# Patient Record
Sex: MALE | Race: WHITE | Employment: PART TIME | URBAN - METROPOLITAN AREA
[De-identification: names, ages, dates, MRNs, and addresses within clinical notes are randomized per-mention and may not be internally consistent; named-entity substitution may affect disease eponyms.]

---

## 2018-05-25 ENCOUNTER — HOSPITAL ENCOUNTER (EMERGENCY)
Age: 48
Discharge: HOME | End: 2018-05-25
Attending: EMERGENCY MEDICINE

## 2018-05-25 ENCOUNTER — APPOINTMENT (OUTPATIENT)
Dept: CT IMAGING | Age: 48
End: 2018-05-25

## 2018-05-25 VITALS
WEIGHT: 210 LBS | SYSTOLIC BLOOD PRESSURE: 138 MMHG | BODY MASS INDEX: 29.4 KG/M2 | RESPIRATION RATE: 14 BRPM | DIASTOLIC BLOOD PRESSURE: 89 MMHG | HEIGHT: 71 IN | HEART RATE: 91 BPM | TEMPERATURE: 98.7 F | OXYGEN SATURATION: 99 %

## 2018-05-25 DIAGNOSIS — T40.604A OPIATE OVERDOSE, UNDETERMINED INTENT, INITIAL ENCOUNTER (HCC): Primary | ICD-10-CM

## 2018-05-25 LAB
ANION GAP SERPL CALCULATED.3IONS-SCNC: 19 MMOL/L (ref 7–16)
BASOPHILS ABSOLUTE: 0.09 E9/L (ref 0–0.2)
BASOPHILS RELATIVE PERCENT: 0.5 % (ref 0–2)
BUN BLDV-MCNC: 14 MG/DL (ref 6–20)
CALCIUM SERPL-MCNC: 8.9 MG/DL (ref 8.6–10.2)
CHLORIDE BLD-SCNC: 99 MMOL/L (ref 98–107)
CO2: 25 MMOL/L (ref 22–29)
CREAT SERPL-MCNC: 1.2 MG/DL (ref 0.7–1.2)
EKG ATRIAL RATE: 97 BPM
EKG P AXIS: 39 DEGREES
EKG P-R INTERVAL: 134 MS
EKG Q-T INTERVAL: 358 MS
EKG QRS DURATION: 72 MS
EKG QTC CALCULATION (BAZETT): 454 MS
EKG R AXIS: 31 DEGREES
EKG T AXIS: 61 DEGREES
EKG VENTRICULAR RATE: 97 BPM
EOSINOPHILS ABSOLUTE: 0.05 E9/L (ref 0.05–0.5)
EOSINOPHILS RELATIVE PERCENT: 0.3 % (ref 0–6)
GFR AFRICAN AMERICAN: >60
GFR NON-AFRICAN AMERICAN: >60 ML/MIN/1.73
GLUCOSE BLD-MCNC: 274 MG/DL (ref 74–109)
HCT VFR BLD CALC: 47.2 % (ref 37–54)
HEMOGLOBIN: 16 G/DL (ref 12.5–16.5)
IMMATURE GRANULOCYTES #: 0.07 E9/L
IMMATURE GRANULOCYTES %: 0.4 % (ref 0–5)
LYMPHOCYTES ABSOLUTE: 5.55 E9/L (ref 1.5–4)
LYMPHOCYTES RELATIVE PERCENT: 33.2 % (ref 20–42)
MCH RBC QN AUTO: 32.4 PG (ref 26–35)
MCHC RBC AUTO-ENTMCNC: 33.9 % (ref 32–34.5)
MCV RBC AUTO: 95.5 FL (ref 80–99.9)
MONOCYTES ABSOLUTE: 1.3 E9/L (ref 0.1–0.95)
MONOCYTES RELATIVE PERCENT: 7.8 % (ref 2–12)
NEUTROPHILS ABSOLUTE: 9.64 E9/L (ref 1.8–7.3)
NEUTROPHILS RELATIVE PERCENT: 57.8 % (ref 43–80)
PDW BLD-RTO: 14 FL (ref 11.5–15)
PLATELET # BLD: 274 E9/L (ref 130–450)
PMV BLD AUTO: 10 FL (ref 7–12)
POTASSIUM SERPL-SCNC: 3.7 MMOL/L (ref 3.5–5)
RBC # BLD: 4.94 E12/L (ref 3.8–5.8)
SODIUM BLD-SCNC: 143 MMOL/L (ref 132–146)
WBC # BLD: 16.7 E9/L (ref 4.5–11.5)

## 2018-05-25 PROCEDURE — 36415 COLL VENOUS BLD VENIPUNCTURE: CPT

## 2018-05-25 PROCEDURE — 70450 CT HEAD/BRAIN W/O DYE: CPT

## 2018-05-25 PROCEDURE — 2580000003 HC RX 258: Performed by: EMERGENCY MEDICINE

## 2018-05-25 PROCEDURE — 85025 COMPLETE CBC W/AUTO DIFF WBC: CPT

## 2018-05-25 PROCEDURE — 2500000003 HC RX 250 WO HCPCS

## 2018-05-25 PROCEDURE — 80048 BASIC METABOLIC PNL TOTAL CA: CPT

## 2018-05-25 PROCEDURE — 99284 EMERGENCY DEPT VISIT MOD MDM: CPT

## 2018-05-25 PROCEDURE — 93005 ELECTROCARDIOGRAM TRACING: CPT | Performed by: EMERGENCY MEDICINE

## 2018-05-25 PROCEDURE — 6360000002 HC RX W HCPCS: Performed by: EMERGENCY MEDICINE

## 2018-05-25 PROCEDURE — 96375 TX/PRO/DX INJ NEW DRUG ADDON: CPT

## 2018-05-25 PROCEDURE — 96374 THER/PROPH/DIAG INJ IV PUSH: CPT

## 2018-05-25 RX ORDER — KETOROLAC TROMETHAMINE 30 MG/ML
15 INJECTION, SOLUTION INTRAMUSCULAR; INTRAVENOUS ONCE
Status: COMPLETED | OUTPATIENT
Start: 2018-05-25 | End: 2018-05-25

## 2018-05-25 RX ORDER — 0.9 % SODIUM CHLORIDE 0.9 %
1000 INTRAVENOUS SOLUTION INTRAVENOUS ONCE
Status: COMPLETED | OUTPATIENT
Start: 2018-05-25 | End: 2018-05-25

## 2018-05-25 RX ORDER — ONDANSETRON 2 MG/ML
8 INJECTION INTRAMUSCULAR; INTRAVENOUS ONCE
Status: COMPLETED | OUTPATIENT
Start: 2018-05-25 | End: 2018-05-25

## 2018-05-25 RX ORDER — DIAPER,BRIEF,INFANT-TODD,DISP
EACH MISCELLANEOUS ONCE
Status: DISCONTINUED | OUTPATIENT
Start: 2018-05-25 | End: 2018-05-25

## 2018-05-25 RX ORDER — AMOXICILLIN AND CLAVULANATE POTASSIUM 500; 125 MG/1; MG/1
1 TABLET, FILM COATED ORAL ONCE
Status: DISCONTINUED | OUTPATIENT
Start: 2018-05-25 | End: 2018-05-25

## 2018-05-25 RX ADMIN — KETOROLAC TROMETHAMINE 15 MG: 30 INJECTION, SOLUTION INTRAMUSCULAR at 09:09

## 2018-05-25 RX ADMIN — ONDANSETRON 8 MG: 2 INJECTION, SOLUTION INTRAMUSCULAR; INTRAVENOUS at 07:04

## 2018-05-25 RX ADMIN — SODIUM CHLORIDE 1000 ML: 9 INJECTION, SOLUTION INTRAVENOUS at 07:03

## 2018-05-25 ASSESSMENT — PAIN DESCRIPTION - PAIN TYPE: TYPE: ACUTE PAIN

## 2018-05-25 ASSESSMENT — PAIN SCALES - GENERAL: PAINLEVEL_OUTOF10: 10

## 2018-05-25 ASSESSMENT — PAIN DESCRIPTION - LOCATION: LOCATION: HEAD

## 2018-05-25 ASSESSMENT — PAIN DESCRIPTION - DESCRIPTORS: DESCRIPTORS: ACHING

## 2020-08-07 ENCOUNTER — OFFICE VISIT (OUTPATIENT)
Dept: PRIMARY CARE CLINIC | Age: 50
End: 2020-08-07
Payer: MEDICAID

## 2020-08-07 VITALS
TEMPERATURE: 97.1 F | SYSTOLIC BLOOD PRESSURE: 130 MMHG | OXYGEN SATURATION: 98 % | HEIGHT: 71 IN | BODY MASS INDEX: 27.72 KG/M2 | DIASTOLIC BLOOD PRESSURE: 84 MMHG | HEART RATE: 67 BPM | WEIGHT: 198 LBS

## 2020-08-07 PROBLEM — I48.91 ATRIAL FIBRILLATION (HCC): Status: ACTIVE | Noted: 2020-08-07

## 2020-08-07 PROBLEM — I47.1 SVT (SUPRAVENTRICULAR TACHYCARDIA) (HCC): Status: ACTIVE | Noted: 2020-08-07

## 2020-08-07 PROBLEM — B33.24 VIRAL CARDIOMYOPATHY (HCC): Status: ACTIVE | Noted: 2020-08-07

## 2020-08-07 PROCEDURE — 99204 OFFICE O/P NEW MOD 45 MIN: CPT | Performed by: NURSE PRACTITIONER

## 2020-08-07 ASSESSMENT — ENCOUNTER SYMPTOMS
VOMITING: 0
ABDOMINAL PAIN: 0
TROUBLE SWALLOWING: 0
PHOTOPHOBIA: 0
SINUS PRESSURE: 0
VOICE CHANGE: 0
FACIAL SWELLING: 0
DIARRHEA: 0
CHOKING: 0
EYE PAIN: 0
CONSTIPATION: 0
WHEEZING: 0
ABDOMINAL DISTENTION: 0
EYE ITCHING: 0
BLOOD IN STOOL: 0
NAUSEA: 0
COLOR CHANGE: 0
SINUS PAIN: 0
SORE THROAT: 0
SHORTNESS OF BREATH: 0
RHINORRHEA: 0
EYE REDNESS: 0
COUGH: 0
CHEST TIGHTNESS: 0
EYE DISCHARGE: 0
BACK PAIN: 1

## 2020-08-07 ASSESSMENT — PATIENT HEALTH QUESTIONNAIRE - PHQ9
1. LITTLE INTEREST OR PLEASURE IN DOING THINGS: 1
SUM OF ALL RESPONSES TO PHQ QUESTIONS 1-9: 1
SUM OF ALL RESPONSES TO PHQ QUESTIONS 1-9: 1
2. FEELING DOWN, DEPRESSED OR HOPELESS: 0
SUM OF ALL RESPONSES TO PHQ9 QUESTIONS 1 & 2: 1
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
## 2020-08-17 ENCOUNTER — OFFICE VISIT (OUTPATIENT)
Dept: PRIMARY CARE CLINIC | Age: 50
End: 2020-08-17
Payer: MEDICAID

## 2020-08-17 VITALS
OXYGEN SATURATION: 99 % | TEMPERATURE: 96.6 F | BODY MASS INDEX: 27.06 KG/M2 | HEART RATE: 87 BPM | DIASTOLIC BLOOD PRESSURE: 96 MMHG | WEIGHT: 194 LBS | SYSTOLIC BLOOD PRESSURE: 142 MMHG

## 2020-08-17 PROCEDURE — 99214 OFFICE O/P EST MOD 30 MIN: CPT | Performed by: NURSE PRACTITIONER

## 2020-08-17 PROCEDURE — 20552 NJX 1/MLT TRIGGER POINT 1/2: CPT | Performed by: NURSE PRACTITIONER

## 2020-08-17 RX ORDER — PREDNISONE 20 MG/1
20 TABLET ORAL 2 TIMES DAILY
Qty: 10 TABLET | Refills: 0 | Status: SHIPPED | OUTPATIENT
Start: 2020-08-17 | End: 2020-08-22

## 2020-08-17 RX ORDER — TIZANIDINE 4 MG/1
4 TABLET ORAL 3 TIMES DAILY
Qty: 21 TABLET | Refills: 0 | Status: SHIPPED | OUTPATIENT
Start: 2020-08-17 | End: 2020-08-24

## 2020-08-17 ASSESSMENT — ENCOUNTER SYMPTOMS
ABDOMINAL PAIN: 0
DIARRHEA: 0
NAUSEA: 0
RECTAL PAIN: 0
VOMITING: 0
BACK PAIN: 1
BLOOD IN STOOL: 0

## 2020-08-17 NOTE — PROGRESS NOTES
Constitutional: Negative for activity change. Cardiovascular: Negative for chest pain, palpitations and leg swelling. Gastrointestinal: Negative for abdominal pain, blood in stool, diarrhea, nausea, rectal pain and vomiting. Genitourinary: Negative for difficulty urinating, dysuria and flank pain. Musculoskeletal: Positive for back pain. Negative for gait problem and myalgias. Current Outpatient Medications:     predniSONE (DELTASONE) 20 MG tablet, Take 1 tablet by mouth 2 times daily for 5 days, Disp: 10 tablet, Rfl: 0    tiZANidine (ZANAFLEX) 4 MG tablet, Take 1 tablet by mouth 3 times daily for 7 days, Disp: 21 tablet, Rfl: 0  Allergies   Allergen Reactions    Red Dye        Past Medical History:   Diagnosis Date    Atrial fibrillation (HCC)     Hypertension         Vitals:    08/17/20 0921 08/17/20 0925   BP: (!) 146/100 (!) 142/96   Pulse: 87    Temp: 96.6 °F (35.9 °C)    SpO2: 99%    Weight: 194 lb (88 kg)        Physical Exam  Constitutional:       Appearance: Normal appearance. HENT:      Head: Normocephalic. Cardiovascular:      Rate and Rhythm: Normal rate and regular rhythm. Pulses: Normal pulses. Pulmonary:      Effort: Pulmonary effort is normal.      Breath sounds: Normal breath sounds. Abdominal:      General: Abdomen is flat.    Musculoskeletal:      Comments: SHOULDER:  Right   Inspection/Palpation - no bruising, no swelling, no crepitus, no deformities, no tenderness    Range of Motion -    pain with passive/active range of motion    Strength/Tone -                          non diagnostic due to pain    Diagnostic Tests - negative  impingement sign, negative cross chest, negative apprehension test, negative sulcus sign, negative anterior laxity, negative anterior-inferior laxity, no multidirectional laxity,  negative drop arm test, negative Speeds test (Slap Leasion), negative Yergasons test (Biceps Tendon and SLAP)    CERVICAL SPINE :    Inspection/Palpation - No bony tenderness, right paraspinal tenderness isolated to a 2 cm x 3 cm area just medial to the right scapula, no palpable defect no step offs appreciated, otherwise normal alignment and posture    Range of Motion -   Limited due to pain ROM-moves slowly and ratchet like    Strength/Tone -          Non diagnostic due to pain      Diagnostic Tests -right  weaker        Neurological:      Mental Status: He is alert. Assessment and Plan:    Jose Rafael Herbert was seen today for neck pain. Diagnoses and all orders for this visit:    Radiculopathy of cervical spine  -     MRI CERVICAL SPINE WO CONTRAST; Future  -     Ambulatory referral to Neurosurgery    Neck pain, acute  -     MRI CERVICAL SPINE WO CONTRAST; Future  -     Ambulatory referral to Neurosurgery    Chronic neck and back pain  -     MRI CERVICAL SPINE WO CONTRAST; Future    Muscle weakness of right upper extremity  -     MRI CERVICAL SPINE WO CONTRAST; Future  -     Ambulatory referral to Neurosurgery    Other orders  -     predniSONE (DELTASONE) 20 MG tablet; Take 1 tablet by mouth 2 times daily for 5 days  -     tiZANidine (ZANAFLEX) 4 MG tablet; Take 1 tablet by mouth 3 times daily for 7 days    Patient was advised to follow-up for an MRI of the cervical spine in the meantime try to get in with neurosurgeon. A trigger injection was done at the office today no reactions patient did not demonstrate any improvement in the office immediately following the injection. 10 min spent performing and discussing procedure risks, hazards and alternatives were discussed with the patient. Medications used: 1% lidocaine without epi   4  ml, mixed with 40 mg/ml of Kenalog    The area over the myofascial spasm was prepped in the usual sterile manner the needle was inserted into the affected area and steroid was injected. There were no complications during the procedure.     Follow-up: The patient tolerated the procedure well without complications standard post procedure care was explained and return precautions given to the patient    No follow-ups on file. Patient has a follow-up with Waqas Doran in 3 weeks    BACK PAIN     The patient's condition did not worsen while here in the office. Findings on physical exam and results of ancillary studies done here were discussed with the patient. Discussed that I think the most likely diagnosis is cervical disc nerve compression versus muscle strain and spasms versus cardiac rationale as etiology of the pain. I have reassessed the patient, and there has been no deterioration of the clinical condition. Patient has remained hemodynamically stable. There is no evidence for neurologic deterioration. After evaluation including careful history and physical examination, there is no evidence for spinal cord, or other acute neurologic dysfunction or injury. There is no evidence for epidural abscess, cauda equina syndrome, impingement due to HNP, thoracic aortic aneurysm, aortic dissection, bony injury, or other serious or life-threatening condition relating to back pain. Comfortable with disposition home, and is encouraged to F/U with emergency Department in 24 to 48 hours if worse pain, fever, emesis, worse illness arises. We will maintain expectant management with a low threshold for ER referral if any worsening or new changes should arise. Otherwise strongly support an MRI and immediate referral to specialist.     Time was made for questions and patient verbalized understanding of discharge planning and instructions. I explained the condition may be caused by a serious or life-threatening condition, not yet manifesting in clinical signs or symptoms but that there is no evidence for that at this time.          Seen By:  Lucy Porter, APRN - CNP

## 2020-08-24 ENCOUNTER — TELEPHONE (OUTPATIENT)
Dept: PRIMARY CARE CLINIC | Age: 50
End: 2020-08-24

## 2020-08-24 NOTE — TELEPHONE ENCOUNTER
Pt states that he has an appt with the neurologist this coming Saturday 08-. States he is in excruciating pain and nothing he has been given has helped,  Prednisone, naproxen, ibuprofen 800mg. Reminded him that we had informed him when he became a new pt that we didn't write narcotic pain med. For new pts., and he stated that he didn't want pain meds.   So I informed him that he should go to er if he cant wait till neuro appt

## 2020-08-26 ENCOUNTER — TELEPHONE (OUTPATIENT)
Dept: FAMILY MEDICINE CLINIC | Age: 50
End: 2020-08-26

## 2020-08-26 NOTE — TELEPHONE ENCOUNTER
Mitzi Navarrete from [facility] PA wanted us to know the PA for his  MRI Cervical Spine was denied .  There records show test wa ordered at [address].

## 2021-01-10 PROBLEM — Z78.9 ADMITTED TO SUBSTANCE MISUSE DETOXIFICATION CENTER: Status: ACTIVE | Noted: 2021-01-10

## 2021-01-10 PROBLEM — F11.93 OPIATE WITHDRAWAL (HCC): Status: ACTIVE | Noted: 2021-01-10

## 2021-01-11 PROBLEM — F11.20 SEVERE OPIOID USE DISORDER (HCC): Chronic | Status: ACTIVE | Noted: 2021-01-11

## 2021-01-11 PROBLEM — F19.10 POLYSUBSTANCE ABUSE (HCC): Chronic | Status: ACTIVE | Noted: 2021-01-11

## 2021-01-11 PROBLEM — F13.930 BENZODIAZEPINE WITHDRAWAL WITHOUT COMPLICATION (HCC): Status: ACTIVE | Noted: 2021-01-11

## 2021-01-11 PROBLEM — F10.930 ALCOHOL WITHDRAWAL SYNDROME WITHOUT COMPLICATION (HCC): Status: ACTIVE | Noted: 2021-01-11

## 2021-01-11 PROBLEM — F19.90 SUBSTANCE USE DISORDER: Chronic | Status: ACTIVE | Noted: 2021-01-11

## 2021-01-11 PROBLEM — Z78.9 ADMITTED TO SUBSTANCE MISUSE DETOXIFICATION CENTER: Status: RESOLVED | Noted: 2021-01-10 | Resolved: 2021-01-11

## 2021-01-12 PROBLEM — R55 VASOVAGAL SYNCOPE: Status: ACTIVE | Noted: 2021-01-12

## 2021-01-13 PROBLEM — R55 VASOVAGAL SYNCOPE: Status: RESOLVED | Noted: 2021-01-12 | Resolved: 2021-01-13

## 2021-01-13 PROBLEM — F13.930 BENZODIAZEPINE WITHDRAWAL WITHOUT COMPLICATION (HCC): Status: RESOLVED | Noted: 2021-01-11 | Resolved: 2021-01-13

## 2021-01-13 PROBLEM — F11.93 OPIATE WITHDRAWAL (HCC): Status: RESOLVED | Noted: 2021-01-10 | Resolved: 2021-01-13

## 2021-01-13 PROBLEM — F10.930 ALCOHOL WITHDRAWAL SYNDROME WITHOUT COMPLICATION (HCC): Status: RESOLVED | Noted: 2021-01-11 | Resolved: 2021-01-13

## 2023-01-19 ENCOUNTER — APPOINTMENT (OUTPATIENT)
Dept: GENERAL RADIOLOGY | Age: 53
End: 2023-01-19
Payer: MEDICAID

## 2023-01-19 ENCOUNTER — HOSPITAL ENCOUNTER (EMERGENCY)
Age: 53
Discharge: HOME | End: 2023-01-19
Attending: EMERGENCY MEDICINE
Payer: MEDICAID

## 2023-01-19 VITALS
DIASTOLIC BLOOD PRESSURE: 85 MMHG | WEIGHT: 180 LBS | BODY MASS INDEX: 25.2 KG/M2 | TEMPERATURE: 97.2 F | RESPIRATION RATE: 16 BRPM | OXYGEN SATURATION: 98 % | SYSTOLIC BLOOD PRESSURE: 120 MMHG | HEIGHT: 71 IN | HEART RATE: 73 BPM

## 2023-01-19 DIAGNOSIS — S69.91XA INJURY OF FINGER OF RIGHT HAND, INITIAL ENCOUNTER: ICD-10-CM

## 2023-01-19 DIAGNOSIS — L03.114 CELLULITIS OF LEFT HAND: Primary | ICD-10-CM

## 2023-01-19 LAB
ACETAMINOPHEN LEVEL: <5 MCG/ML (ref 10–30)
ALBUMIN SERPL-MCNC: 4 G/DL (ref 3.5–5.2)
ALP BLD-CCNC: 56 U/L (ref 40–129)
ALT SERPL-CCNC: 45 U/L (ref 0–40)
AMPHETAMINE SCREEN, URINE: POSITIVE
ANION GAP SERPL CALCULATED.3IONS-SCNC: 9 MMOL/L (ref 7–16)
AST SERPL-CCNC: 40 U/L (ref 0–39)
BARBITURATE SCREEN URINE: NOT DETECTED
BASOPHILS ABSOLUTE: 0.02 E9/L (ref 0–0.2)
BASOPHILS RELATIVE PERCENT: 0.4 % (ref 0–2)
BENZODIAZEPINE SCREEN, URINE: NOT DETECTED
BILIRUB SERPL-MCNC: 1 MG/DL (ref 0–1.2)
BUN BLDV-MCNC: 12 MG/DL (ref 6–20)
C-REACTIVE PROTEIN: 8.5 MG/DL (ref 0–0.4)
CALCIUM SERPL-MCNC: 9.2 MG/DL (ref 8.6–10.2)
CANNABINOID SCREEN URINE: NOT DETECTED
CHLORIDE BLD-SCNC: 102 MMOL/L (ref 98–107)
CO2: 27 MMOL/L (ref 22–29)
COCAINE METABOLITE SCREEN URINE: NOT DETECTED
CREAT SERPL-MCNC: 0.8 MG/DL (ref 0.7–1.2)
EOSINOPHILS ABSOLUTE: 0.01 E9/L (ref 0.05–0.5)
EOSINOPHILS RELATIVE PERCENT: 0.2 % (ref 0–6)
ETHANOL: <10 MG/DL (ref 0–0.08)
FENTANYL SCREEN, URINE: NOT DETECTED
GFR SERPL CREATININE-BSD FRML MDRD: >60 ML/MIN/1.73
GLUCOSE BLD-MCNC: 124 MG/DL (ref 74–99)
HCT VFR BLD CALC: 39.5 % (ref 37–54)
HEMOGLOBIN: 14.3 G/DL (ref 12.5–16.5)
IMMATURE GRANULOCYTES #: 0.01 E9/L
IMMATURE GRANULOCYTES %: 0.2 % (ref 0–5)
LACTIC ACID, SEPSIS: 1.7 MMOL/L (ref 0.5–1.9)
LYMPHOCYTES ABSOLUTE: 1.25 E9/L (ref 1.5–4)
LYMPHOCYTES RELATIVE PERCENT: 25.1 % (ref 20–42)
Lab: ABNORMAL
MCH RBC QN AUTO: 32.9 PG (ref 26–35)
MCHC RBC AUTO-ENTMCNC: 36.2 % (ref 32–34.5)
MCV RBC AUTO: 91 FL (ref 80–99.9)
METHADONE SCREEN, URINE: NOT DETECTED
MONOCYTES ABSOLUTE: 0.96 E9/L (ref 0.1–0.95)
MONOCYTES RELATIVE PERCENT: 19.3 % (ref 2–12)
NEUTROPHILS ABSOLUTE: 2.73 E9/L (ref 1.8–7.3)
NEUTROPHILS RELATIVE PERCENT: 54.8 % (ref 43–80)
OPIATE SCREEN URINE: NOT DETECTED
OXYCODONE URINE: NOT DETECTED
PDW BLD-RTO: 13.4 FL (ref 11.5–15)
PHENCYCLIDINE SCREEN URINE: NOT DETECTED
PLATELET # BLD: 107 E9/L (ref 130–450)
PMV BLD AUTO: 10.2 FL (ref 7–12)
POTASSIUM REFLEX MAGNESIUM: 3.6 MMOL/L (ref 3.5–5)
PROCALCITONIN: 0.24 NG/ML (ref 0–0.08)
RBC # BLD: 4.34 E12/L (ref 3.8–5.8)
SALICYLATE, SERUM: <0.3 MG/DL (ref 0–30)
SEDIMENTATION RATE, ERYTHROCYTE: 10 MM/HR (ref 0–15)
SODIUM BLD-SCNC: 138 MMOL/L (ref 132–146)
TOTAL CK: 438 U/L (ref 20–200)
TOTAL PROTEIN: 6.8 G/DL (ref 6.4–8.3)
TRICYCLIC ANTIDEPRESSANTS SCREEN SERUM: NEGATIVE NG/ML
WBC # BLD: 5 E9/L (ref 4.5–11.5)

## 2023-01-19 PROCEDURE — 6360000002 HC RX W HCPCS

## 2023-01-19 PROCEDURE — 99284 EMERGENCY DEPT VISIT MOD MDM: CPT

## 2023-01-19 PROCEDURE — 85651 RBC SED RATE NONAUTOMATED: CPT

## 2023-01-19 PROCEDURE — 73130 X-RAY EXAM OF HAND: CPT

## 2023-01-19 PROCEDURE — 83605 ASSAY OF LACTIC ACID: CPT

## 2023-01-19 PROCEDURE — 36415 COLL VENOUS BLD VENIPUNCTURE: CPT

## 2023-01-19 PROCEDURE — 80053 COMPREHEN METABOLIC PANEL: CPT

## 2023-01-19 PROCEDURE — 80179 DRUG ASSAY SALICYLATE: CPT

## 2023-01-19 PROCEDURE — 82550 ASSAY OF CK (CPK): CPT

## 2023-01-19 PROCEDURE — 82077 ASSAY SPEC XCP UR&BREATH IA: CPT

## 2023-01-19 PROCEDURE — 90714 TD VACC NO PRESV 7 YRS+ IM: CPT

## 2023-01-19 PROCEDURE — 6370000000 HC RX 637 (ALT 250 FOR IP)

## 2023-01-19 PROCEDURE — 86140 C-REACTIVE PROTEIN: CPT

## 2023-01-19 PROCEDURE — 90471 IMMUNIZATION ADMIN: CPT

## 2023-01-19 PROCEDURE — 80143 DRUG ASSAY ACETAMINOPHEN: CPT

## 2023-01-19 PROCEDURE — 85025 COMPLETE CBC W/AUTO DIFF WBC: CPT

## 2023-01-19 PROCEDURE — 84145 PROCALCITONIN (PCT): CPT

## 2023-01-19 PROCEDURE — 80307 DRUG TEST PRSMV CHEM ANLYZR: CPT

## 2023-01-19 PROCEDURE — 87040 BLOOD CULTURE FOR BACTERIA: CPT

## 2023-01-19 PROCEDURE — 2580000003 HC RX 258: Performed by: EMERGENCY MEDICINE

## 2023-01-19 RX ORDER — IBUPROFEN 600 MG/1
600 TABLET ORAL 4 TIMES DAILY PRN
Qty: 120 TABLET | Refills: 0 | Status: SHIPPED | OUTPATIENT
Start: 2023-01-19 | End: 2023-01-19 | Stop reason: SDUPTHER

## 2023-01-19 RX ORDER — CEPHALEXIN 250 MG/1
500 CAPSULE ORAL 4 TIMES DAILY
Qty: 56 CAPSULE | Refills: 0 | Status: SHIPPED | OUTPATIENT
Start: 2023-01-19 | End: 2023-01-26

## 2023-01-19 RX ORDER — SULFAMETHOXAZOLE AND TRIMETHOPRIM 800; 160 MG/1; MG/1
2 TABLET ORAL EVERY 12 HOURS SCHEDULED
Status: DISCONTINUED | OUTPATIENT
Start: 2023-01-19 | End: 2023-01-19 | Stop reason: HOSPADM

## 2023-01-19 RX ORDER — CEPHALEXIN 500 MG/1
500 CAPSULE ORAL EVERY 6 HOURS SCHEDULED
Status: DISCONTINUED | OUTPATIENT
Start: 2023-01-19 | End: 2023-01-19 | Stop reason: HOSPADM

## 2023-01-19 RX ORDER — IBUPROFEN 600 MG/1
600 TABLET ORAL 4 TIMES DAILY PRN
Qty: 120 TABLET | Refills: 0 | Status: SHIPPED | OUTPATIENT
Start: 2023-01-19 | End: 2023-02-18

## 2023-01-19 RX ORDER — 0.9 % SODIUM CHLORIDE 0.9 %
1000 INTRAVENOUS SOLUTION INTRAVENOUS ONCE
Status: COMPLETED | OUTPATIENT
Start: 2023-01-19 | End: 2023-01-19

## 2023-01-19 RX ORDER — SULFAMETHOXAZOLE AND TRIMETHOPRIM 800; 160 MG/1; MG/1
2 TABLET ORAL 2 TIMES DAILY
Qty: 28 TABLET | Refills: 0 | Status: SHIPPED | OUTPATIENT
Start: 2023-01-19 | End: 2023-01-26

## 2023-01-19 RX ORDER — CEPHALEXIN 250 MG/1
500 CAPSULE ORAL 4 TIMES DAILY
Qty: 56 CAPSULE | Refills: 0 | Status: SHIPPED | OUTPATIENT
Start: 2023-01-19 | End: 2023-01-19 | Stop reason: SDUPTHER

## 2023-01-19 RX ORDER — SULFAMETHOXAZOLE AND TRIMETHOPRIM 800; 160 MG/1; MG/1
2 TABLET ORAL 2 TIMES DAILY
Qty: 28 TABLET | Refills: 0 | Status: SHIPPED | OUTPATIENT
Start: 2023-01-19 | End: 2023-01-19 | Stop reason: SDUPTHER

## 2023-01-19 RX ORDER — IBUPROFEN 800 MG/1
800 TABLET ORAL 3 TIMES DAILY PRN
Status: DISCONTINUED | OUTPATIENT
Start: 2023-01-19 | End: 2023-01-19 | Stop reason: HOSPADM

## 2023-01-19 RX ADMIN — CLOSTRIDIUM TETANI TOXOID ANTIGEN (FORMALDEHYDE INACTIVATED) AND CORYNEBACTERIUM DIPHTHERIAE TOXOID ANTIGEN (FORMALDEHYDE INACTIVATED) 0.5 ML: 5; 2 INJECTION, SUSPENSION INTRAMUSCULAR at 12:17

## 2023-01-19 RX ADMIN — SODIUM CHLORIDE 1000 ML: 9 INJECTION, SOLUTION INTRAVENOUS at 12:15

## 2023-01-19 RX ADMIN — IBUPROFEN 800 MG: 800 TABLET, FILM COATED ORAL at 12:16

## 2023-01-19 RX ADMIN — CEPHALEXIN 500 MG: 500 CAPSULE ORAL at 12:16

## 2023-01-19 ASSESSMENT — PAIN - FUNCTIONAL ASSESSMENT: PAIN_FUNCTIONAL_ASSESSMENT: 0-10

## 2023-01-19 ASSESSMENT — PAIN SCALES - GENERAL: PAINLEVEL_OUTOF10: 8
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
## 2023-01-20 LAB
BLOOD CULTURE, ROUTINE: NORMAL
CULTURE, BLOOD 2: NORMAL

## 2023-02-17 ENCOUNTER — APPOINTMENT (OUTPATIENT)
Dept: CT IMAGING | Age: 53
End: 2023-02-17
Payer: MEDICAID

## 2023-02-17 ENCOUNTER — APPOINTMENT (OUTPATIENT)
Dept: GENERAL RADIOLOGY | Age: 53
End: 2023-02-17
Payer: MEDICAID

## 2023-02-17 ENCOUNTER — HOSPITAL ENCOUNTER (EMERGENCY)
Age: 53
Discharge: HOME | End: 2023-02-18
Attending: EMERGENCY MEDICINE
Payer: MEDICAID

## 2023-02-17 VITALS
RESPIRATION RATE: 15 BRPM | DIASTOLIC BLOOD PRESSURE: 67 MMHG | WEIGHT: 180 LBS | BODY MASS INDEX: 25.77 KG/M2 | HEART RATE: 81 BPM | HEIGHT: 70 IN | OXYGEN SATURATION: 100 % | SYSTOLIC BLOOD PRESSURE: 115 MMHG | TEMPERATURE: 97.4 F

## 2023-02-17 DIAGNOSIS — F10.920 ACUTE ALCOHOLIC INTOXICATION WITHOUT COMPLICATION (HCC): ICD-10-CM

## 2023-02-17 DIAGNOSIS — T50.901A ACCIDENTAL DRUG OVERDOSE, INITIAL ENCOUNTER: Primary | ICD-10-CM

## 2023-02-17 LAB
ALBUMIN SERPL-MCNC: 4.3 G/DL (ref 3.5–5.2)
ALP BLD-CCNC: 62 U/L (ref 40–129)
ALT SERPL-CCNC: 148 U/L (ref 0–40)
ANION GAP SERPL CALCULATED.3IONS-SCNC: 15 MMOL/L (ref 7–16)
AST SERPL-CCNC: 157 U/L (ref 0–39)
BASOPHILS ABSOLUTE: 0.03 E9/L (ref 0–0.2)
BASOPHILS RELATIVE PERCENT: 0.6 % (ref 0–2)
BILIRUB SERPL-MCNC: 0.3 MG/DL (ref 0–1.2)
BUN BLDV-MCNC: 22 MG/DL (ref 6–20)
CALCIUM SERPL-MCNC: 8.9 MG/DL (ref 8.6–10.2)
CHLORIDE BLD-SCNC: 103 MMOL/L (ref 98–107)
CO2: 21 MMOL/L (ref 22–29)
CREAT SERPL-MCNC: 1.3 MG/DL (ref 0.7–1.2)
EOSINOPHILS ABSOLUTE: 0.06 E9/L (ref 0.05–0.5)
EOSINOPHILS RELATIVE PERCENT: 1.1 % (ref 0–6)
ETHANOL: 80 MG/DL (ref 0–0.08)
GFR SERPL CREATININE-BSD FRML MDRD: >60 ML/MIN/1.73
GLUCOSE BLD-MCNC: 165 MG/DL (ref 74–99)
HCT VFR BLD CALC: 43.7 % (ref 37–54)
HEMOGLOBIN: 15.6 G/DL (ref 12.5–16.5)
IMMATURE GRANULOCYTES #: 0.04 E9/L
IMMATURE GRANULOCYTES %: 0.7 % (ref 0–5)
LYMPHOCYTES ABSOLUTE: 1.36 E9/L (ref 1.5–4)
LYMPHOCYTES RELATIVE PERCENT: 25 % (ref 20–42)
MCH RBC QN AUTO: 32.4 PG (ref 26–35)
MCHC RBC AUTO-ENTMCNC: 35.7 % (ref 32–34.5)
MCV RBC AUTO: 90.9 FL (ref 80–99.9)
MONOCYTES ABSOLUTE: 0.37 E9/L (ref 0.1–0.95)
MONOCYTES RELATIVE PERCENT: 6.8 % (ref 2–12)
NEUTROPHILS ABSOLUTE: 3.57 E9/L (ref 1.8–7.3)
NEUTROPHILS RELATIVE PERCENT: 65.8 % (ref 43–80)
PDW BLD-RTO: 14.6 FL (ref 11.5–15)
PLATELET # BLD: 164 E9/L (ref 130–450)
PMV BLD AUTO: 9.9 FL (ref 7–12)
POTASSIUM SERPL-SCNC: 4.4 MMOL/L (ref 3.5–5)
RBC # BLD: 4.81 E12/L (ref 3.8–5.8)
SODIUM BLD-SCNC: 139 MMOL/L (ref 132–146)
TOTAL PROTEIN: 7.2 G/DL (ref 6.4–8.3)
TROPONIN, HIGH SENSITIVITY: 13 NG/L (ref 0–11)
TROPONIN, HIGH SENSITIVITY: 13 NG/L (ref 0–11)
WBC # BLD: 5.4 E9/L (ref 4.5–11.5)

## 2023-02-17 PROCEDURE — 93005 ELECTROCARDIOGRAM TRACING: CPT | Performed by: STUDENT IN AN ORGANIZED HEALTH CARE EDUCATION/TRAINING PROGRAM

## 2023-02-17 PROCEDURE — 96375 TX/PRO/DX INJ NEW DRUG ADDON: CPT

## 2023-02-17 PROCEDURE — 71045 X-RAY EXAM CHEST 1 VIEW: CPT

## 2023-02-17 PROCEDURE — 82077 ASSAY SPEC XCP UR&BREATH IA: CPT

## 2023-02-17 PROCEDURE — 2500000003 HC RX 250 WO HCPCS: Performed by: STUDENT IN AN ORGANIZED HEALTH CARE EDUCATION/TRAINING PROGRAM

## 2023-02-17 PROCEDURE — 85025 COMPLETE CBC W/AUTO DIFF WBC: CPT

## 2023-02-17 PROCEDURE — 2580000003 HC RX 258: Performed by: STUDENT IN AN ORGANIZED HEALTH CARE EDUCATION/TRAINING PROGRAM

## 2023-02-17 PROCEDURE — 6360000002 HC RX W HCPCS: Performed by: STUDENT IN AN ORGANIZED HEALTH CARE EDUCATION/TRAINING PROGRAM

## 2023-02-17 PROCEDURE — 70450 CT HEAD/BRAIN W/O DYE: CPT

## 2023-02-17 PROCEDURE — 84484 ASSAY OF TROPONIN QUANT: CPT

## 2023-02-17 PROCEDURE — 80053 COMPREHEN METABOLIC PANEL: CPT

## 2023-02-17 PROCEDURE — 99285 EMERGENCY DEPT VISIT HI MDM: CPT

## 2023-02-17 PROCEDURE — 96374 THER/PROPH/DIAG INJ IV PUSH: CPT

## 2023-02-17 RX ORDER — THIAMINE HYDROCHLORIDE 100 MG/ML
100 INJECTION, SOLUTION INTRAMUSCULAR; INTRAVENOUS ONCE
Status: COMPLETED | OUTPATIENT
Start: 2023-02-17 | End: 2023-02-17

## 2023-02-17 RX ORDER — 0.9 % SODIUM CHLORIDE 0.9 %
1000 INTRAVENOUS SOLUTION INTRAVENOUS ONCE
Status: COMPLETED | OUTPATIENT
Start: 2023-02-17 | End: 2023-02-17

## 2023-02-17 RX ORDER — ONDANSETRON 2 MG/ML
4 INJECTION INTRAMUSCULAR; INTRAVENOUS ONCE
Status: COMPLETED | OUTPATIENT
Start: 2023-02-17 | End: 2023-02-17

## 2023-02-17 RX ORDER — FOLIC ACID 5 MG/ML
1 INJECTION, SOLUTION INTRAMUSCULAR; INTRAVENOUS; SUBCUTANEOUS ONCE
Status: COMPLETED | OUTPATIENT
Start: 2023-02-17 | End: 2023-02-17

## 2023-02-17 RX ADMIN — ONDANSETRON 4 MG: 2 INJECTION INTRAMUSCULAR; INTRAVENOUS at 21:01

## 2023-02-17 RX ADMIN — SODIUM CHLORIDE 1000 ML: 9 INJECTION, SOLUTION INTRAVENOUS at 21:07

## 2023-02-17 RX ADMIN — FOLIC ACID 1 MG: 5 INJECTION, SOLUTION INTRAMUSCULAR; INTRAVENOUS; SUBCUTANEOUS at 22:15

## 2023-02-17 RX ADMIN — THIAMINE HYDROCHLORIDE 100 MG: 100 INJECTION, SOLUTION INTRAMUSCULAR; INTRAVENOUS at 21:04

## 2023-02-17 ASSESSMENT — PAIN - FUNCTIONAL ASSESSMENT: PAIN_FUNCTIONAL_ASSESSMENT: NONE - DENIES PAIN

## 2023-02-17 ASSESSMENT — LIFESTYLE VARIABLES
HOW OFTEN DO YOU HAVE A DRINK CONTAINING ALCOHOL: MONTHLY OR LESS
HOW MANY STANDARD DRINKS CONTAINING ALCOHOL DO YOU HAVE ON A TYPICAL DAY: 10 OR MORE

## 2023-02-18 LAB
EKG ATRIAL RATE: 89 BPM
EKG P AXIS: 40 DEGREES
EKG P-R INTERVAL: 130 MS
EKG Q-T INTERVAL: 374 MS
EKG QRS DURATION: 78 MS
EKG QTC CALCULATION (BAZETT): 455 MS
EKG R AXIS: 14 DEGREES
EKG T AXIS: 68 DEGREES
EKG VENTRICULAR RATE: 89 BPM

## 2023-02-18 NOTE — DISCHARGE INSTRUCTIONS
Thank you for the opportunity to serve in your medical care today. . Follow up with your doctor is critical for optimal healing. Should you have any new or worsening symptoms, please return to the Emergency Department for further work-up and evaluation.

## 2023-02-18 NOTE — ED NOTES
Patient back in department from CT. Patient connected to tele monitor with VS updated.      Yvon Langley RN  02/17/23 1956
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
## 2023-02-20 PROCEDURE — 93010 ELECTROCARDIOGRAM REPORT: CPT | Performed by: INTERNAL MEDICINE

## 2023-02-21 ENCOUNTER — APPOINTMENT (OUTPATIENT)
Dept: CT IMAGING | Age: 53
DRG: 917 | End: 2023-02-21
Payer: MEDICAID

## 2023-02-21 ENCOUNTER — HOSPITAL ENCOUNTER (INPATIENT)
Age: 53
LOS: 8 days | Discharge: HOME | DRG: 917 | End: 2023-03-01
Attending: EMERGENCY MEDICINE | Admitting: FAMILY MEDICINE
Payer: MEDICAID

## 2023-02-21 ENCOUNTER — APPOINTMENT (OUTPATIENT)
Dept: GENERAL RADIOLOGY | Age: 53
DRG: 917 | End: 2023-02-21
Payer: MEDICAID

## 2023-02-21 DIAGNOSIS — F19.10 POLYSUBSTANCE ABUSE (HCC): ICD-10-CM

## 2023-02-21 DIAGNOSIS — R40.2432 GLASGOW COMA SCALE TOTAL SCORE 3-8, AT ARRIVAL TO EMERGENCY DEPARTMENT (HCC): Primary | ICD-10-CM

## 2023-02-21 DIAGNOSIS — T50.904A OVERDOSE OF UNDETERMINED INTENT, INITIAL ENCOUNTER: ICD-10-CM

## 2023-02-21 DIAGNOSIS — F10.10 ETOH ABUSE: ICD-10-CM

## 2023-02-21 DIAGNOSIS — E87.20 LACTIC ACIDOSIS: ICD-10-CM

## 2023-02-21 PROBLEM — J96.00 ACUTE RESPIRATORY FAILURE (HCC): Status: ACTIVE | Noted: 2023-02-21

## 2023-02-21 LAB
AADO2: 147 MMHG
ACETAMINOPHEN LEVEL: <5 MCG/ML (ref 10–30)
ALBUMIN SERPL-MCNC: 3.4 G/DL (ref 3.5–5.2)
ALBUMIN SERPL-MCNC: 4.5 G/DL (ref 3.5–5.2)
ALP BLD-CCNC: 39 U/L (ref 40–129)
ALP BLD-CCNC: 61 U/L (ref 40–129)
ALT SERPL-CCNC: 142 U/L (ref 0–40)
ALT SERPL-CCNC: 185 U/L (ref 0–40)
AMPHETAMINE SCREEN, URINE: NOT DETECTED
ANION GAP SERPL CALCULATED.3IONS-SCNC: 18 MMOL/L (ref 7–16)
ANION GAP SERPL CALCULATED.3IONS-SCNC: 5 MMOL/L (ref 7–16)
APTT: 35.9 SEC (ref 24.5–35.1)
AST SERPL-CCNC: 119 U/L (ref 0–39)
AST SERPL-CCNC: 167 U/L (ref 0–39)
B.E.: -2.6 MMOL/L (ref -3–3)
B.E.: -9.6 MMOL/L (ref -3–3)
BACTERIA: NORMAL /HPF
BARBITURATE SCREEN URINE: NOT DETECTED
BASOPHILS ABSOLUTE: 0.02 E9/L (ref 0–0.2)
BASOPHILS ABSOLUTE: 0.05 E9/L (ref 0–0.2)
BASOPHILS RELATIVE PERCENT: 0.2 % (ref 0–2)
BASOPHILS RELATIVE PERCENT: 0.6 % (ref 0–2)
BENZODIAZEPINE SCREEN, URINE: NOT DETECTED
BILIRUB SERPL-MCNC: 0.6 MG/DL (ref 0–1.2)
BILIRUB SERPL-MCNC: 0.7 MG/DL (ref 0–1.2)
BILIRUBIN URINE: NEGATIVE
BLOOD, URINE: ABNORMAL
BUN BLDV-MCNC: 8 MG/DL (ref 6–20)
BUN BLDV-MCNC: 9 MG/DL (ref 6–20)
CALCIUM SERPL-MCNC: 7.9 MG/DL (ref 8.6–10.2)
CALCIUM SERPL-MCNC: 9.6 MG/DL (ref 8.6–10.2)
CANNABINOID SCREEN URINE: POSITIVE
CHLORIDE BLD-SCNC: 105 MMOL/L (ref 98–107)
CHLORIDE BLD-SCNC: 110 MMOL/L (ref 98–107)
CLARITY: CLEAR
CO2: 21 MMOL/L (ref 22–29)
CO2: 27 MMOL/L (ref 22–29)
COCAINE METABOLITE SCREEN URINE: POSITIVE
COHB: 1.2 % (ref 0–1.5)
COHB: 2.3 % (ref 0–1.5)
COLOR: YELLOW
COMMENT: ABNORMAL
CREAT SERPL-MCNC: 1 MG/DL (ref 0.7–1.2)
CREAT SERPL-MCNC: 1.1 MG/DL (ref 0.7–1.2)
CRITICAL: ABNORMAL
CRITICAL: ABNORMAL
DATE ANALYZED: ABNORMAL
DATE ANALYZED: ABNORMAL
DATE OF COLLECTION: ABNORMAL
DATE OF COLLECTION: ABNORMAL
EOSINOPHILS ABSOLUTE: 0.03 E9/L (ref 0.05–0.5)
EOSINOPHILS ABSOLUTE: 0.06 E9/L (ref 0.05–0.5)
EOSINOPHILS RELATIVE PERCENT: 0.3 % (ref 0–6)
EOSINOPHILS RELATIVE PERCENT: 0.7 % (ref 0–6)
ETHANOL: 75 MG/DL (ref 0–0.08)
ETHANOL: <10 MG/DL (ref 0–0.08)
FENTANYL SCREEN, URINE: POSITIVE
FIO2: 40 %
GFR SERPL CREATININE-BSD FRML MDRD: >60 ML/MIN/1.73
GFR SERPL CREATININE-BSD FRML MDRD: >60 ML/MIN/1.73
GLUCOSE BLD-MCNC: 192 MG/DL (ref 74–99)
GLUCOSE BLD-MCNC: 85 MG/DL (ref 74–99)
GLUCOSE URINE: NEGATIVE MG/DL
HCO3: 16.8 MMOL/L (ref 22–26)
HCO3: 21.5 MMOL/L (ref 22–26)
HCT VFR BLD CALC: 38.4 % (ref 37–54)
HCT VFR BLD CALC: 49.5 % (ref 37–54)
HEMOGLOBIN: 13.3 G/DL (ref 12.5–16.5)
HEMOGLOBIN: 16.9 G/DL (ref 12.5–16.5)
HHB: 0.8 % (ref 0–5)
HHB: 3.6 % (ref 0–5)
IMMATURE GRANULOCYTES #: 0.03 E9/L
IMMATURE GRANULOCYTES #: 0.04 E9/L
IMMATURE GRANULOCYTES %: 0.4 % (ref 0–5)
IMMATURE GRANULOCYTES %: 0.4 % (ref 0–5)
INR BLD: 0.9
KETONES, URINE: NEGATIVE MG/DL
LAB: ABNORMAL
LAB: ABNORMAL
LACTIC ACID, SEPSIS: 2.9 MMOL/L (ref 0.5–1.9)
LACTIC ACID, SEPSIS: 8.7 MMOL/L (ref 0.5–1.9)
LACTIC ACID: 1.2 MMOL/L (ref 0.5–2.2)
LEUKOCYTE ESTERASE, URINE: NEGATIVE
LYMPHOCYTES ABSOLUTE: 1.66 E9/L (ref 1.5–4)
LYMPHOCYTES ABSOLUTE: 3.28 E9/L (ref 1.5–4)
LYMPHOCYTES RELATIVE PERCENT: 16.7 % (ref 20–42)
LYMPHOCYTES RELATIVE PERCENT: 39 % (ref 20–42)
Lab: ABNORMAL
MAGNESIUM: 1.6 MG/DL (ref 1.6–2.6)
MCH RBC QN AUTO: 33.2 PG (ref 26–35)
MCH RBC QN AUTO: 33.3 PG (ref 26–35)
MCHC RBC AUTO-ENTMCNC: 34.1 % (ref 32–34.5)
MCHC RBC AUTO-ENTMCNC: 34.6 % (ref 32–34.5)
MCV RBC AUTO: 96.2 FL (ref 80–99.9)
MCV RBC AUTO: 97.2 FL (ref 80–99.9)
METER GLUCOSE: 145 MG/DL (ref 74–99)
METHADONE SCREEN, URINE: NOT DETECTED
METHB: 0.2 % (ref 0–1.5)
METHB: 0.4 % (ref 0–1.5)
MODE: ABNORMAL
MODE: AC
MONOCYTES ABSOLUTE: 0.88 E9/L (ref 0.1–0.95)
MONOCYTES ABSOLUTE: 1.16 E9/L (ref 0.1–0.95)
MONOCYTES RELATIVE PERCENT: 13.8 % (ref 2–12)
MONOCYTES RELATIVE PERCENT: 8.9 % (ref 2–12)
NEUTROPHILS ABSOLUTE: 3.82 E9/L (ref 1.8–7.3)
NEUTROPHILS ABSOLUTE: 7.29 E9/L (ref 1.8–7.3)
NEUTROPHILS RELATIVE PERCENT: 45.5 % (ref 43–80)
NEUTROPHILS RELATIVE PERCENT: 73.5 % (ref 43–80)
NITRITE, URINE: NEGATIVE
O2 CONTENT: 18.9 ML/DL
O2 CONTENT: 21.5 ML/DL
O2 SATURATION: 96.3 % (ref 92–98.5)
O2 SATURATION: 99.2 % (ref 92–98.5)
O2HB: 94.8 % (ref 94–97)
O2HB: 96.7 % (ref 94–97)
OPERATOR ID: 2067
OPERATOR ID: 2593
OPIATE SCREEN URINE: NOT DETECTED
OXYCODONE URINE: NOT DETECTED
PATIENT TEMP: 37 C
PATIENT TEMP: 37 C
PCO2: 35.5 MMHG (ref 35–45)
PCO2: 38.2 MMHG (ref 35–45)
PDW BLD-RTO: 14.2 FL (ref 11.5–15)
PDW BLD-RTO: 14.6 FL (ref 11.5–15)
PEEP/CPAP: 5 CMH2O
PFO2: 2.18 MMHG/%
PH BLOOD GAS: 7.26 (ref 7.35–7.45)
PH BLOOD GAS: 7.4 (ref 7.35–7.45)
PH UA: 6 (ref 5–9)
PHENCYCLIDINE SCREEN URINE: NOT DETECTED
PHOSPHORUS: 3.7 MG/DL (ref 2.5–4.5)
PLATELET # BLD: 158 E9/L (ref 130–450)
PLATELET # BLD: 213 E9/L (ref 130–450)
PMV BLD AUTO: 9.8 FL (ref 7–12)
PMV BLD AUTO: 9.9 FL (ref 7–12)
PO2: 373 MMHG (ref 75–100)
PO2: 87.4 MMHG (ref 75–100)
POTASSIUM SERPL-SCNC: 4.6 MMOL/L (ref 3.5–5)
POTASSIUM SERPL-SCNC: 4.7 MMOL/L (ref 3.5–5)
PROCALCITONIN: 0.12 NG/ML (ref 0–0.08)
PROTEIN UA: 30 MG/DL
PROTHROMBIN TIME: 10.3 SEC (ref 9.3–12.4)
RBC # BLD: 3.99 E12/L (ref 3.8–5.8)
RBC # BLD: 5.09 E12/L (ref 3.8–5.8)
RBC UA: NORMAL /HPF (ref 0–2)
RI(T): 1.68
RR MECHANICAL: 24 B/MIN
SALICYLATE, SERUM: <0.3 MG/DL (ref 0–30)
SODIUM BLD-SCNC: 142 MMOL/L (ref 132–146)
SODIUM BLD-SCNC: 144 MMOL/L (ref 132–146)
SOURCE, BLOOD GAS: ABNORMAL
SOURCE, BLOOD GAS: ABNORMAL
SPECIFIC GRAVITY UA: 1.02 (ref 1–1.03)
THB: 14.1 G/DL (ref 11.5–16.5)
THB: 15.1 G/DL (ref 11.5–16.5)
TIME ANALYZED: 1237
TIME ANALYZED: 2051
TOTAL CK: 280 U/L (ref 20–200)
TOTAL PROTEIN: 5.7 G/DL (ref 6.4–8.3)
TOTAL PROTEIN: 7.9 G/DL (ref 6.4–8.3)
TRICYCLIC ANTIDEPRESSANTS SCREEN SERUM: NEGATIVE NG/ML
TROPONIN, HIGH SENSITIVITY: 12 NG/L (ref 0–11)
UROBILINOGEN, URINE: 0.2 E.U./DL
VT MECHANICAL: 450 ML
WBC # BLD: 8.4 E9/L (ref 4.5–11.5)
WBC # BLD: 9.9 E9/L (ref 4.5–11.5)
WBC UA: NORMAL /HPF (ref 0–5)

## 2023-02-21 PROCEDURE — 31500 INSERT EMERGENCY AIRWAY: CPT

## 2023-02-21 PROCEDURE — 72125 CT NECK SPINE W/O DYE: CPT

## 2023-02-21 PROCEDURE — 82962 GLUCOSE BLOOD TEST: CPT

## 2023-02-21 PROCEDURE — 6360000002 HC RX W HCPCS: Performed by: INTERNAL MEDICINE

## 2023-02-21 PROCEDURE — 96374 THER/PROPH/DIAG INJ IV PUSH: CPT

## 2023-02-21 PROCEDURE — 85610 PROTHROMBIN TIME: CPT

## 2023-02-21 PROCEDURE — 85025 COMPLETE CBC W/AUTO DIFF WBC: CPT

## 2023-02-21 PROCEDURE — 83735 ASSAY OF MAGNESIUM: CPT

## 2023-02-21 PROCEDURE — 87088 URINE BACTERIA CULTURE: CPT

## 2023-02-21 PROCEDURE — 71045 X-RAY EXAM CHEST 1 VIEW: CPT

## 2023-02-21 PROCEDURE — 80179 DRUG ASSAY SALICYLATE: CPT

## 2023-02-21 PROCEDURE — 99291 CRITICAL CARE FIRST HOUR: CPT | Performed by: INTERNAL MEDICINE

## 2023-02-21 PROCEDURE — 81001 URINALYSIS AUTO W/SCOPE: CPT

## 2023-02-21 PROCEDURE — 2580000003 HC RX 258: Performed by: INTERNAL MEDICINE

## 2023-02-21 PROCEDURE — 84100 ASSAY OF PHOSPHORUS: CPT

## 2023-02-21 PROCEDURE — 80053 COMPREHEN METABOLIC PANEL: CPT

## 2023-02-21 PROCEDURE — 80143 DRUG ASSAY ACETAMINOPHEN: CPT

## 2023-02-21 PROCEDURE — 2000000000 HC ICU R&B

## 2023-02-21 PROCEDURE — 80307 DRUG TEST PRSMV CHEM ANLYZR: CPT

## 2023-02-21 PROCEDURE — 96376 TX/PRO/DX INJ SAME DRUG ADON: CPT

## 2023-02-21 PROCEDURE — 2580000003 HC RX 258: Performed by: FAMILY MEDICINE

## 2023-02-21 PROCEDURE — 2580000003 HC RX 258: Performed by: EMERGENCY MEDICINE

## 2023-02-21 PROCEDURE — 6360000002 HC RX W HCPCS: Performed by: FAMILY MEDICINE

## 2023-02-21 PROCEDURE — 93005 ELECTROCARDIOGRAM TRACING: CPT | Performed by: EMERGENCY MEDICINE

## 2023-02-21 PROCEDURE — 82077 ASSAY SPEC XCP UR&BREATH IA: CPT

## 2023-02-21 PROCEDURE — 5A1935Z RESPIRATORY VENTILATION, LESS THAN 24 CONSECUTIVE HOURS: ICD-10-PCS | Performed by: EMERGENCY MEDICINE

## 2023-02-21 PROCEDURE — 6360000002 HC RX W HCPCS: Performed by: EMERGENCY MEDICINE

## 2023-02-21 PROCEDURE — 6360000002 HC RX W HCPCS

## 2023-02-21 PROCEDURE — 87040 BLOOD CULTURE FOR BACTERIA: CPT

## 2023-02-21 PROCEDURE — 94664 DEMO&/EVAL PT USE INHALER: CPT

## 2023-02-21 PROCEDURE — 2500000003 HC RX 250 WO HCPCS: Performed by: EMERGENCY MEDICINE

## 2023-02-21 PROCEDURE — 84145 PROCALCITONIN (PCT): CPT

## 2023-02-21 PROCEDURE — 83605 ASSAY OF LACTIC ACID: CPT

## 2023-02-21 PROCEDURE — 82550 ASSAY OF CK (CPK): CPT

## 2023-02-21 PROCEDURE — C9113 INJ PANTOPRAZOLE SODIUM, VIA: HCPCS | Performed by: INTERNAL MEDICINE

## 2023-02-21 PROCEDURE — 6370000000 HC RX 637 (ALT 250 FOR IP): Performed by: FAMILY MEDICINE

## 2023-02-21 PROCEDURE — 94002 VENT MGMT INPAT INIT DAY: CPT

## 2023-02-21 PROCEDURE — 0BH17EZ INSERTION OF ENDOTRACHEAL AIRWAY INTO TRACHEA, VIA NATURAL OR ARTIFICIAL OPENING: ICD-10-PCS | Performed by: EMERGENCY MEDICINE

## 2023-02-21 PROCEDURE — 84484 ASSAY OF TROPONIN QUANT: CPT

## 2023-02-21 PROCEDURE — 70450 CT HEAD/BRAIN W/O DYE: CPT

## 2023-02-21 PROCEDURE — 82805 BLOOD GASES W/O2 SATURATION: CPT

## 2023-02-21 PROCEDURE — 2580000003 HC RX 258

## 2023-02-21 PROCEDURE — 85730 THROMBOPLASTIN TIME PARTIAL: CPT

## 2023-02-21 PROCEDURE — 96375 TX/PRO/DX INJ NEW DRUG ADDON: CPT

## 2023-02-21 PROCEDURE — 99285 EMERGENCY DEPT VISIT HI MDM: CPT

## 2023-02-21 PROCEDURE — 84478 ASSAY OF TRIGLYCERIDES: CPT

## 2023-02-21 RX ORDER — SODIUM CHLORIDE 0.9 % (FLUSH) 0.9 %
5-40 SYRINGE (ML) INJECTION PRN
Status: DISCONTINUED | OUTPATIENT
Start: 2023-02-21 | End: 2023-03-01 | Stop reason: HOSPADM

## 2023-02-21 RX ORDER — MIDAZOLAM HYDROCHLORIDE 2 MG/2ML
4 INJECTION, SOLUTION INTRAMUSCULAR; INTRAVENOUS ONCE
Status: COMPLETED | OUTPATIENT
Start: 2023-02-21 | End: 2023-02-21

## 2023-02-21 RX ORDER — MIDAZOLAM HYDROCHLORIDE 1 MG/ML
INJECTION INTRAMUSCULAR; INTRAVENOUS
Status: COMPLETED
Start: 2023-02-21 | End: 2023-02-21

## 2023-02-21 RX ORDER — POLYETHYLENE GLYCOL 3350 17 G/17G
17 POWDER, FOR SOLUTION ORAL DAILY PRN
Status: DISCONTINUED | OUTPATIENT
Start: 2023-02-21 | End: 2023-03-01 | Stop reason: HOSPADM

## 2023-02-21 RX ORDER — SODIUM CHLORIDE 0.9 % (FLUSH) 0.9 %
5-40 SYRINGE (ML) INJECTION EVERY 12 HOURS SCHEDULED
Status: DISCONTINUED | OUTPATIENT
Start: 2023-02-21 | End: 2023-03-01 | Stop reason: HOSPADM

## 2023-02-21 RX ORDER — ONDANSETRON 4 MG/1
4 TABLET, ORALLY DISINTEGRATING ORAL EVERY 8 HOURS PRN
Status: DISCONTINUED | OUTPATIENT
Start: 2023-02-21 | End: 2023-03-01 | Stop reason: HOSPADM

## 2023-02-21 RX ORDER — ETOMIDATE 2 MG/ML
20 INJECTION INTRAVENOUS ONCE
Status: COMPLETED | OUTPATIENT
Start: 2023-02-21 | End: 2023-02-21

## 2023-02-21 RX ORDER — ACETAMINOPHEN 325 MG/1
650 TABLET ORAL EVERY 6 HOURS PRN
Status: DISCONTINUED | OUTPATIENT
Start: 2023-02-21 | End: 2023-03-01 | Stop reason: HOSPADM

## 2023-02-21 RX ORDER — SODIUM CHLORIDE, SODIUM LACTATE, POTASSIUM CHLORIDE, AND CALCIUM CHLORIDE .6; .31; .03; .02 G/100ML; G/100ML; G/100ML; G/100ML
1000 INJECTION, SOLUTION INTRAVENOUS ONCE
Status: COMPLETED | OUTPATIENT
Start: 2023-02-21 | End: 2023-02-21

## 2023-02-21 RX ORDER — PROPOFOL 10 MG/ML
5-50 INJECTION, EMULSION INTRAVENOUS CONTINUOUS
Status: DISCONTINUED | OUTPATIENT
Start: 2023-02-21 | End: 2023-02-22

## 2023-02-21 RX ORDER — ONDANSETRON 2 MG/ML
4 INJECTION INTRAMUSCULAR; INTRAVENOUS EVERY 6 HOURS PRN
Status: DISCONTINUED | OUTPATIENT
Start: 2023-02-21 | End: 2023-03-01 | Stop reason: HOSPADM

## 2023-02-21 RX ORDER — SODIUM CHLORIDE 9 MG/ML
INJECTION, SOLUTION INTRAVENOUS CONTINUOUS
Status: DISCONTINUED | OUTPATIENT
Start: 2023-02-21 | End: 2023-02-21

## 2023-02-21 RX ORDER — ENOXAPARIN SODIUM 100 MG/ML
40 INJECTION SUBCUTANEOUS DAILY
Status: DISCONTINUED | OUTPATIENT
Start: 2023-02-21 | End: 2023-03-01 | Stop reason: HOSPADM

## 2023-02-21 RX ORDER — PANTOPRAZOLE SODIUM 40 MG/10ML
40 INJECTION, POWDER, LYOPHILIZED, FOR SOLUTION INTRAVENOUS DAILY
Status: DISCONTINUED | OUTPATIENT
Start: 2023-02-21 | End: 2023-02-22

## 2023-02-21 RX ORDER — 0.9 % SODIUM CHLORIDE 0.9 %
1000 INTRAVENOUS SOLUTION INTRAVENOUS ONCE
Status: COMPLETED | OUTPATIENT
Start: 2023-02-21 | End: 2023-02-21

## 2023-02-21 RX ORDER — SODIUM CHLORIDE 9 MG/ML
INJECTION, SOLUTION INTRAVENOUS PRN
Status: DISCONTINUED | OUTPATIENT
Start: 2023-02-21 | End: 2023-03-01 | Stop reason: HOSPADM

## 2023-02-21 RX ORDER — ACETAMINOPHEN 650 MG/1
650 SUPPOSITORY RECTAL EVERY 6 HOURS PRN
Status: DISCONTINUED | OUTPATIENT
Start: 2023-02-21 | End: 2023-03-01 | Stop reason: HOSPADM

## 2023-02-21 RX ORDER — ROCURONIUM BROMIDE 10 MG/ML
70 INJECTION, SOLUTION INTRAVENOUS ONCE
Status: COMPLETED | OUTPATIENT
Start: 2023-02-21 | End: 2023-02-21

## 2023-02-21 RX ORDER — KETAMINE HYDROCHLORIDE 10 MG/ML
100 INJECTION INTRAMUSCULAR; INTRAVENOUS ONCE
Status: COMPLETED | OUTPATIENT
Start: 2023-02-21 | End: 2023-02-21

## 2023-02-21 RX ORDER — LANOLIN ALCOHOL/MO/W.PET/CERES
100 CREAM (GRAM) TOPICAL DAILY
Status: DISCONTINUED | OUTPATIENT
Start: 2023-02-28 | End: 2023-03-01 | Stop reason: HOSPADM

## 2023-02-21 RX ORDER — MIDAZOLAM HYDROCHLORIDE 1 MG/ML
1-10 INJECTION, SOLUTION INTRAVENOUS CONTINUOUS
Status: DISCONTINUED | OUTPATIENT
Start: 2023-02-21 | End: 2023-02-22

## 2023-02-21 RX ORDER — IPRATROPIUM BROMIDE AND ALBUTEROL SULFATE 2.5; .5 MG/3ML; MG/3ML
1 SOLUTION RESPIRATORY (INHALATION)
Status: DISCONTINUED | OUTPATIENT
Start: 2023-02-21 | End: 2023-02-23

## 2023-02-21 RX ADMIN — SODIUM CHLORIDE 1000 ML: 9 INJECTION, SOLUTION INTRAVENOUS at 12:15

## 2023-02-21 RX ADMIN — SODIUM CHLORIDE, POTASSIUM CHLORIDE, SODIUM LACTATE AND CALCIUM CHLORIDE 1000 ML: 600; 310; 30; 20 INJECTION, SOLUTION INTRAVENOUS at 14:57

## 2023-02-21 RX ADMIN — ETOMIDATE 20 MG: 2 INJECTION INTRAVENOUS at 12:24

## 2023-02-21 RX ADMIN — KETAMINE HYDROCHLORIDE 100 MG: 10 INJECTION, SOLUTION INTRAMUSCULAR; INTRAVENOUS at 12:27

## 2023-02-21 RX ADMIN — PROPOFOL 20 MCG/KG/MIN: 10 INJECTION, EMULSION INTRAVENOUS at 14:24

## 2023-02-21 RX ADMIN — PANTOPRAZOLE SODIUM 40 MG: 40 INJECTION, POWDER, FOR SOLUTION INTRAVENOUS at 22:00

## 2023-02-21 RX ADMIN — SODIUM CHLORIDE, PRESERVATIVE FREE 10 ML: 5 INJECTION INTRAVENOUS at 22:00

## 2023-02-21 RX ADMIN — ROCURONIUM BROMIDE 70 MG: 10 INJECTION, SOLUTION INTRAVENOUS at 12:24

## 2023-02-21 RX ADMIN — Medication 5 MG/HR: at 13:29

## 2023-02-21 RX ADMIN — THIAMINE HYDROCHLORIDE 500 MG: 200 INJECTION, SOLUTION INTRAMUSCULAR; INTRAVENOUS at 21:57

## 2023-02-21 RX ADMIN — MIDAZOLAM HYDROCHLORIDE 4 MG: 2 INJECTION, SOLUTION INTRAMUSCULAR; INTRAVENOUS at 13:18

## 2023-02-21 RX ADMIN — SODIUM CHLORIDE: 9 INJECTION, SOLUTION INTRAVENOUS at 13:04

## 2023-02-21 RX ADMIN — IPRATROPIUM BROMIDE AND ALBUTEROL SULFATE 1 AMPULE: 2.5; .5 SOLUTION RESPIRATORY (INHALATION) at 20:21

## 2023-02-21 RX ADMIN — PROPOFOL 15 MCG/KG/MIN: 10 INJECTION, EMULSION INTRAVENOUS at 21:53

## 2023-02-21 RX ADMIN — ENOXAPARIN SODIUM 40 MG: 100 INJECTION SUBCUTANEOUS at 16:38

## 2023-02-21 RX ADMIN — KETAMINE HYDROCHLORIDE 100 MG: 10 INJECTION, SOLUTION INTRAMUSCULAR; INTRAVENOUS at 13:36

## 2023-02-21 RX ADMIN — Medication 10 MG/HR: at 22:00

## 2023-02-21 RX ADMIN — MIDAZOLAM 4 MG: 1 INJECTION INTRAMUSCULAR; INTRAVENOUS at 13:18

## 2023-02-21 ASSESSMENT — PULMONARY FUNCTION TESTS
PIF_VALUE: 25
PIF_VALUE: 32
PIF_VALUE: 27
PIF_VALUE: 30
PIF_VALUE: 26
PIF_VALUE: 30
PIF_VALUE: 28
PIF_VALUE: 67
PIF_VALUE: 18
PIF_VALUE: 24

## 2023-02-21 ASSESSMENT — PAIN SCALES - GENERAL
PAINLEVEL_OUTOF10: 0
PAINLEVEL_OUTOF10: 0

## 2023-02-21 NOTE — H&P
Hospital Medicine History & Physical      PCP: GLYNN Ochoa NP    Date of Admission: 2/21/2023    Date of Service: Pt seen/examined on 2/21/2023  and Admitted to Inpatient with expected LOS greater than two midnights due to medical therapy. Chief Complaint:  Unresponsive       History Of Present Illness:    45 y.o. male with past medical history of drug abuse atrial fibrillation hypertension . Patient was admitted due to unresponsive . Patient was found lying down behind the soup kitchen . EMS was called and the patient was found obtunded . Patient received 10 mg Narcan  and he woke up and started screaming  . Patient was unable to protect airway and was intubated . In the ED patient was afebrile /78  HR 81 RR 22 100%  on ventilator . Lab data reveal  sodium 144 potassium 4.7 BUN 8 Scr 1.0 lactic acid 8.7 glucose 192  Trop 12    UDS THC cocaine fentanyl  tylenol and salicylate neg  WBC 8.4 HGB 16.9  UA neg ABG 7.2/38/16  CT head neg CT cervical spine neg EKG NSR . Patient will be admitted to ICU for further evaluation     Past Medical History:          Diagnosis Date    Atrial fibrillation ([address]     Hypertension        Past Surgical History:          Procedure Laterality Date    ANGIOPLASTY      HAND SURGERY Left     pins in lt hand       Medications Prior to Admission:      Prior to Admission medications    Not on File       Allergies:  Red dye    Social History:      The patient currently lives with family     TOBACCO:   reports that he has been smoking cigarettes. He started smoking about 37 years ago. He has been smoking an average of .5 packs per day. He has never used smokeless tobacco.  ETOH:   reports current alcohol use. Family History:       Reviewed in detail and negative for DM, CAD, Cancer, CVA. Positive as follows: Adopted: Yes       REVIEW OF SYSTEMS:   Pertinent positives as noted in the HPI.  All other systems reviewed and negative. PHYSICAL EXAM:    /69   Pulse 69   Temp 97.5 °F (36.4 °C) (Core)   Resp 16   SpO2 96%     General appearance: intubated and sedated      HEENT:  Normal cephalic, atraumatic without obvious deformity. Pupils equal, round, and reactive to light. Extra ocular muscles intact. Conjunctivae/corneas clear. Neck: Supple, with full range of motion. No jugular venous distention. Trachea midline. Respiratory: intubated Clear to auscultation,   Cardiovascular:  Regular rate and rhythm with normal S1/S2 without murmurs, rubs or gallops. Abdomen: Soft, non-tender, non-distended with normal bowel sounds. Musculoskeletal:  No clubbing, cyanosis or edema bilaterally. Full range of motion without deformity. Skin: Skin color, texture, turgor normal.  No rashes or lesions. Neurologic:  Neurovascularly intact without any focal sensory/motor deficits. Cranial nerves: II-XII intact, grossly non-focal.    Psychiatric: unable to assess due to patient is intubated       CXR:  I have reviewed the CXR   EKG:  I have reviewed the EKG     Labs:     Recent Labs     02/21/23  1240   WBC 8.4   HGB 16.9*   HCT 49.5        Recent Labs     02/21/23  1240      K 4.7      CO2 21*   BUN 8   CREATININE 1.0   CALCIUM 9.6     Recent Labs     02/21/23  1240   *   *   BILITOT 0.7   ALKPHOS 61     Recent Labs     02/21/23  1240   INR 0.9     Recent Labs     02/21/23  1240   CKTOTAL 280*       Urinalysis:      Lab Results   Component Value Date/Time    NITRU Negative 02/21/2023 12:40 PM    WBCUA NONE 02/21/2023 12:40 PM    BACTERIA NONE SEEN 02/21/2023 12:40 PM    RBCUA 1-3 02/21/2023 12:40 PM    BLOODU TRACE-INTACT 02/21/2023 12:40 PM    SPECGRAV 1.025 02/21/2023 12:40 PM    GLUCOSEU Negative 02/21/2023 12:40 PM         ASSESSMENT:    Active Hospital Problems    Diagnosis Date Noted    Acute respiratory failure ([facility] [address] [J96.00] 02/21/2023     Priority: Medium     No acute intracranial abnormality. Specifically, there is no acute   intracranial hemorrhage       Right maxillary and bilateral ethmoid sinus disease       . There is no acute compression fracture or subluxation of the cervical   spine. 2. Multilevel degenerative disc and degenerative joint disease. PLAN:    Acute respiratory failure :  secondary to drug overdose . Patient was found unresponsive outside soup kitchen .   ABG 7.2 /38/16 lactic acid 8.7 Trop 12 EKG NSR WBC 8.4      CXR neg CT head neg Ct cervical spine  neg  patient is intubated Admit ICU  pulmonary toilet wean as tolerated  vitals telemetry  blood cultures ,     Acute metabolic encephalopathy ; sec to drug overdose      High anion gap metabolic acidosis Lactic acidosis ; 8.7 , anion gap 18 sec to hypoxia     Polysubstance abuse : UDS  positive cocaine THC fentanyl     Atrial fibrillation ; monitor telemetry     Hypertension : Hold BP meds     Transaminitis :   AST  167 monitor  Hepatitis and HIv panel         DVT Prophylaxis: Lovenox   Diet: Diet NPO  Code Status: Full Code        Dispo - home        Monica Cook MD

## 2023-02-21 NOTE — ED NOTES
Pt intubated with size 7.5 ETT, 22 @ lip.  ET [address] [facility], [facility]  02/21/23 8537

## 2023-02-21 NOTE — PROGRESS NOTES
Patient admitted to room 4424 from ED. Patient unable to be oriented to room, call light, bed rails, phone, lights and bathroom as he is intubated and sedated. Patient unable to be instructed about the schedule of the day including: vital sign frequency, lab draws, possible tests, frequency of MD and staff rounds, including RN/MD rounding together at bedside, daily weights, and I &O's due to being intubated and sedated. Patient unable to be instructed about prescribed diet. Bed alarm in place. Bedside telemetry in place. Bed locked, in lowest position, side rails up 4/4 due to being intubated and sedated and bilateral restrained. Patient has residents at the bed side. Patient is intubated with 7.5 ET tube secured at 23 cm at the lip. Patient has a temp sensing alejo in place with yellow clear fluid noted in bag. Patient has 3 PIV, 18 right AC, 18 (squad line) left AC, and a 20 in the left hand. Patient has versed running at 10 mg/hr, propofol running at 20 mcg/kg/min, and NS running at 100 ml/hr. Patient has no skin issues noted, however does an odd left thumb nail. Patient has boots, socks, and a belt at bed side, all there clothing had been cut off.

## 2023-02-21 NOTE — ED PROVIDER NOTES
Olimpia Cosme 45 y.o. male PHMx of T, atrial fibrillation, concern for polysubstance use disorder presents to the ED c/o altered mental status. No family or caregivers with the patient to give history. Report from EMS is that patient was found passed out laying down behind the soup kitchen, EMS was called and found the patient obtunded. They gave him 10 mg of Narcan and he woke up and started screaming. No further history was able to be obtained    On initial evaluation patient he is altered, not protecting his airway, and unable to give any answers       Review of Systems   Reason unable to perform ROS: Altered mental status. Physical Exam  Constitutional:       Appearance: He is normal weight. He is ill-appearing and toxic-appearing. HENT:      Head: Normocephalic and atraumatic. Right Ear: External ear normal.      Left Ear: External ear normal.      Nose: Nose normal.      Mouth/Throat:      Mouth: Mucous membranes are moist.      Pharynx: Oropharynx is clear. Cardiovascular:      Rate and Rhythm: Normal rate and regular rhythm. Pulses: Normal pulses. Heart sounds: Normal heart sounds. Pulmonary:      Effort: Pulmonary effort is normal.      Breath sounds: Normal breath sounds. Abdominal:      General: There is no distension. Tenderness: There is no abdominal tenderness. Musculoskeletal:         General: Normal range of motion. Cervical back: Normal range of motion and neck supple. Skin:     General: Skin is warm and dry. Capillary Refill: Capillary refill takes less than 2 seconds. Neurological:      GCS: GCS eye subscore is 2. GCS verbal subscore is 2. GCS motor subscore is 3. Procedures     Medical Decision Making  Christine Boss 45 y.o. male presented to the ED c/o possible overdose and found down/unresponsive.  Upon evaluation/physical examination of the Pt he was unresponsive, mean, flexor posturing, not responding to commands,not protecting his airway, normotensive, tachycardic, GCS 6. Emergent concern of attending respiratory collapse and the patient unable to protect his airway. It was decided to undergo RSI with etomidate and rocuronium, please see this procedure note for this. Patient airway was established and protected Laboratory and imaging diagnostics were undertaken. Differential diagnosis included: Altered mental status, overdose of unknown substance, encephalopathy, electrolyte derangement, cerebral hemorrhage, other. After the patient was stabilized he was taken to the CT scanner for emergent CT imaging. CT of the head without contrast showed no acute intracranial abnormality, no acute intracranial hemorrhage, CT cervical spine without contrast showed no acute compression fracture or subluxation, chest x-ray no acute cardiopulmonary process. Patient's laboratory evaluation revealed normal INR, no leukocytosis, H&H normal, normal electrolytes and kidney function, elevation in ALT/AST at 185/167, UA negative, lactic acidosis of 8.7, repeat after receiving IV fluids at 2.9. Patient's serum drug screen revealed ethanol level of 75, urine drug screen revealed positive for cannabinoid, cocaine, and fentanyl. Patient's total CK2 80. No family arrived or caregivers for history taking. All history was given by EMS. Discussion was made with ICU team who agreed admit the patient to ICU for further evaluation and work-up and also the hospitalist team.  Patient stable but in critical condition at time admission to the ICU.       Suggested E/M Coding Level 32633  (This level has been selected based on the 2023 CPT guidelines for E/M codes in the ED.)    COPA:    This patient has high complexity 1 acute or chronic illness that poses a threat to life or body function    DATA:    This patient required moderate data complexity    Tests Ordered: 3    RISK:     This patient has high risk due to decision regarding hospitalization    Amount and/or Complexity of Data Reviewed  Labs: ordered. Radiology: ordered. ECG/medicine tests: ordered. Risk  Prescription drug management. Decision regarding hospitalization. ED Course as of 02/21/23 2137 Tue Feb 21, 2023 1229 PROCEDURE  2/21/23       Time: 1225pm    INTUBATION  Risks, benefits and alternatives if able (for applicable procedures below) described. Performed By: Sasha Flynn DO. Indication:  impending respiratory failure and airway protection. Informed consent: Consent unable to be obtained due to the emergent nature of this procedure. .  Procedure: Following Preoxygenation the patient was pretreated with etomidate followed by rocuronium. Intubation was performed after single attempt(s) by direct laryngoscopy using a Glidescope and 7.5mm cuffed endotracheal tube was inserted . Initial post procedure placement:  confirmed by bilateral breath sounds, ETCO2 detection, and absence of sounds over stomach. Tube Secured @ 22cm at the Lip. Post procedure chest x-ray: has been ordered but is still pending. Procedural Complications: None. Anesthesia Consult:  no. [JR]   [id number] ICU Physician Accepted Pt for ICU  [JR]      ED Course User Index  [JR] Sasha Flynn DO         ED Course as of 02/21/23 2137 Tue Feb 21, 2023 1229 PROCEDURE  2/21/23       Time: 1225pm    INTUBATION  Risks, benefits and alternatives if able (for applicable procedures below) described. Performed By: Sasha Flynn DO. Indication:  impending respiratory failure and airway protection. Informed consent: Consent unable to be obtained due to the emergent nature of this procedure. .  Procedure: Following Preoxygenation the patient was pretreated with etomidate followed by rocuronium. Intubation was performed after single attempt(s) by direct laryngoscopy using a Glidescope and 7.5mm cuffed endotracheal tube was inserted .   Initial post procedure placement:  confirmed by bilateral breath sounds, ETCO2 detection, and absence of sounds over stomach. Tube Secured @ 22cm at the Lip. Post procedure chest x-ray: has been ordered but is still pending. Procedural Complications: None. Anesthesia Consult:  no. [JR]   [id number] ICU Physician Accepted Pt for ICU  [JR]      ED Course User Index  [JR] Alyson Chamberlain DO       --------------------------------------------- PAST HISTORY ---------------------------------------------  Past Medical History:  has a past medical history of Atrial fibrillation ([address] and Hypertension. Past Surgical History:  has a past surgical history that includes Hand surgery (Left) and angioplasty. Social History:  reports that he has been smoking cigarettes. He started smoking about 37 years ago. He has been smoking an average of .5 packs per day. He has never used smokeless tobacco. He reports current alcohol use. He reports current drug use. Drugs: Opiates  and Marijuana ([place]). Family History: family history is not on file. He was adopted. The patients home medications have been reviewed.     Allergies: Red dye    -------------------------------------------------- RESULTS -------------------------------------------------    LABS:  Results for orders placed or performed during the hospital encounter of 02/21/23   Troponin   Result Value Ref Range    Troponin, High Sensitivity 12 (H) 0 - 11 ng/L   Protime-INR   Result Value Ref Range    Protime 10.3 9.3 - 12.4 sec    INR 0.9    APTT   Result Value Ref Range    aPTT 35.9 (H) 24.5 - 35.1 sec   CBC with Auto Differential   Result Value Ref Range    WBC 8.4 4.5 - 11.5 E9/L    RBC 5.09 3.80 - 5.80 E12/L    Hemoglobin 16.9 (H) 12.5 - 16.5 g/dL    Hematocrit 49.5 37.0 - 54.0 %    MCV 97.2 80.0 - 99.9 fL    MCH 33.2 26.0 - 35.0 pg    MCHC 34.1 32.0 - 34.5 %    RDW 14.2 11.5 - 15.0 fL    Platelets 824 758 - 288 E9/L    MPV 9.9 7.0 - 12.0 fL    Neutrophils % 45.5 43.0 - 80.0 %    Immature Granulocytes % 0.4 0.0 - 5.0 %    Lymphocytes % 39.0 20.0 - 42.0 %    Monocytes % 13.8 (H) 2.0 - 12.0 %    Eosinophils % 0.7 0.0 - 6.0 %    Basophils % 0.6 0.0 - 2.0 %    Neutrophils Absolute 3.82 1.80 - 7.30 E9/L    Immature Granulocytes # 0.03 E9/L    Lymphocytes Absolute 3.28 1.50 - 4.00 E9/L    Monocytes Absolute 1.16 (H) 0.10 - 0.95 E9/L    Eosinophils Absolute 0.06 0.05 - 0.50 E9/L    Basophils Absolute 0.05 0.00 - 0.20 E9/L   Comprehensive Metabolic Panel   Result Value Ref Range    Sodium 144 132 - 146 mmol/L    Potassium 4.7 3.5 - 5.0 mmol/L    Chloride 105 98 - 107 mmol/L    CO2 21 (L) 22 - 29 mmol/L    Anion Gap 18 (H) 7 - 16 mmol/L    Glucose 192 (H) 74 - 99 mg/dL    BUN 8 6 - 20 mg/dL    Creatinine 1.0 0.7 - 1.2 mg/dL    Est, Glom Filt Rate >60 >=60 mL/min/1.73    Calcium 9.6 8.6 - 10.2 mg/dL    Total Protein 7.9 6.4 - 8.3 g/dL    Albumin 4.5 3.5 - 5.2 g/dL    Total Bilirubin 0.7 0.0 - 1.2 mg/dL    Alkaline Phosphatase 61 40 - 129 U/L     (H) 0 - 40 U/L     (H) 0 - 39 U/L   Urinalysis   Result Value Ref Range    Color, UA Yellow Straw/Yellow    Clarity, UA Clear Clear    Glucose, Ur Negative Negative mg/dL    Bilirubin Urine Negative Negative    Ketones, Urine Negative Negative mg/dL    Specific Gravity, UA 1.025 1.005 - 1.030    Blood, Urine TRACE-INTACT Negative    pH, UA 6.0 5.0 - 9.0    Protein, UA 30 (A) Negative mg/dL    Urobilinogen, Urine 0.2 <2.0 E.U./dL    Nitrite, Urine Negative Negative    Leukocyte Esterase, Urine Negative Negative   Lactate, Sepsis   Result Value Ref Range    Lactic Acid, Sepsis 8.7 (HH) 0.5 - 1.9 mmol/L   Lactate, Sepsis   Result Value Ref Range    Lactic Acid, Sepsis 2.9 (H) 0.5 - 1.9 mmol/L   Serum Drug Screen   Result Value Ref Range    Ethanol Lvl 75 mg/dL    Acetaminophen Level <5.0 (L) 10.0 - 40.5 mcg/mL    Salicylate, Serum <6.6 0.0 - 30.0 mg/dL    TCA Scrn NEGATIVE Cutoff:300 ng/mL   URINE DRUG SCREEN   Result Value Ref Range    Amphetamine Screen, Urine NOT DETECTED Negative <1000 ng/mL Barbiturate Screen, Ur NOT DETECTED Negative < 200 ng/mL    Benzodiazepine Screen, Urine NOT DETECTED Negative < 200 ng/mL    Cannabinoid Scrn, Ur POSITIVE (A) Negative < 50ng/mL    Cocaine Metabolite Screen, Urine POSITIVE (A) Negative < 300 ng/mL    Opiate Scrn, Ur NOT DETECTED Negative < 300ng/mL    PCP Screen, Urine NOT DETECTED Negative < 25 ng/mL    Methadone Screen, Urine NOT DETECTED Negative <300 ng/mL    Oxycodone Urine NOT DETECTED Negative <100 ng/mL    FENTANYL SCREEN, URINE POSITIVE (A) Negative <1 ng/mL    Drug Screen Comment: see below    CK   Result Value Ref Range    Total  (H) 20 - 200 U/L   Blood Gas, Arterial   Result Value Ref Range    Date Analyzed 20230221     Time Analyzed 1237     Source: Blood Arterial     pH, Blood Gas 7.260 (L) 7.350 - 7.450    PCO2 38.2 35.0 - 45.0 mmHg    PO2 373.0 (H) 75.0 - 100.0 mmHg    HCO3 16.8 (L) 22.0 - 26.0 mmol/L    B.E. -9.6 (L) -3.0 - 3.0 mmol/L    O2 Sat 99.2 (H) 92.0 - 98.5 %    O2Hb 96.7 94.0 - 97.0 %    COHb 2.3 (H) 0.0 - 1.5 %    MetHb 0.2 0.0 - 1.5 %    O2 Content 21.5 mL/dL    HHb 0.8 0.0 - 5.0 %    tHb (est) 15.1 11.5 - 16.5 g/dL    Mode Bagging     Comment       Removed, \"Called to\", from crit not field as there are no critical values on this report. 2-21-23 12:46 mmb    Date Of Collection      Time Collected      Pt Temp 37.0 C     ID [id number]     Lab [id number]     Critical(s) Notified .  No Critical Values    Microscopic Urinalysis   Result Value Ref Range    WBC, UA NONE 0 - 5 /HPF    RBC, UA 1-3 0 - 2 /HPF    Bacteria, UA NONE SEEN None Seen /HPF   Procalcitonin   Result Value Ref Range    Procalcitonin 0.12 (H) 0.00 - 0.08 ng/mL   Blood Gas, Arterial   Result Value Ref Range    Date Analyzed 20230221     Time Analyzed 2051     Source: Blood Arterial     pH, Blood Gas 7.401 7.350 - 7.450    PCO2 35.5 35.0 - 45.0 mmHg    PO2 87.4 75.0 - 100.0 mmHg    HCO3 21.5 (L) 22.0 - 26.0 mmol/L    B.E. -2.6 -3.0 - 3.0 mmol/L    O2 Sat 96.3 92.0 - 98.5 % PO2/FIO2 2.18 mmHg/%    AaDO2 147.0 mmHg    RI(T) 1.68     O2Hb 94.8 94.0 - 97.0 %    COHb 1.2 0.0 - 1.5 %    MetHb 0.4 0.0 - 1.5 %    O2 Content 18.9 mL/dL    HHb 3.6 0.0 - 5.0 %    tHb (est) 14.1 11.5 - 16.5 g/dL    Mode AC     FIO2 40.0 %    Rr Mechanical 24.0 b/min    Vt Mechanical 450.0 mL    Peep/Cpap 5.0 cmH2O    Date Of Collection      Time Collected      Pt Temp 37.0 C     ID [id number]     Lab [id number]     Critical(s) Notified . No Critical Values    CBC with Auto Differential   Result Value Ref Range    WBC 9.9 4.5 - 11.5 E9/L    RBC 3.99 3.80 - 5.80 E12/L    Hemoglobin 13.3 12.5 - 16.5 g/dL    Hematocrit 38.4 37.0 - 54.0 %    MCV 96.2 80.0 - 99.9 fL    MCH 33.3 26.0 - 35.0 pg    MCHC 34.6 (H) 32.0 - 34.5 %    RDW 14.6 11.5 - 15.0 fL    Platelets 772 401 - 996 E9/L    MPV 9.8 7.0 - 12.0 fL    Neutrophils % 73.5 43.0 - 80.0 %    Immature Granulocytes % 0.4 0.0 - 5.0 %    Lymphocytes % 16.7 (L) 20.0 - 42.0 %    Monocytes % 8.9 2.0 - 12.0 %    Eosinophils % 0.3 0.0 - 6.0 %    Basophils % 0.2 0.0 - 2.0 %    Neutrophils Absolute 7.29 1.80 - 7.30 E9/L    Immature Granulocytes # 0.04 E9/L    Lymphocytes Absolute 1.66 1.50 - 4.00 E9/L    Monocytes Absolute 0.88 0.10 - 0.95 E9/L    Eosinophils Absolute 0.03 (L) 0.05 - 0.50 E9/L    Basophils Absolute 0.02 0.00 - 0.20 E9/L   POCT Glucose   Result Value Ref Range    Meter Glucose 145 (H) 74 - 99 mg/dL   EKG 12 Lead   Result Value Ref Range    Ventricular Rate 97 BPM    Atrial Rate 97 BPM    P-R Interval 122 ms    QRS Duration 70 ms    Q-T Interval 370 ms    QTc Calculation (Bazett) 469 ms    P Axis 74 degrees    R Axis 44 degrees    T Axis 64 degrees       RADIOLOGY:  XR CHEST PORTABLE   Final Result   High position of endotracheal tube. No acute process. CT HEAD WO CONTRAST   Final Result   No acute intracranial abnormality. Specifically, there is no acute   intracranial hemorrhage      Right maxillary and bilateral ethmoid sinus disease.          CT CERVICAL SPINE WO CONTRAST   Final Result   1. There is no acute compression fracture or subluxation of the cervical   spine. 2. Multilevel degenerative disc and degenerative joint disease. XR CHEST PORTABLE    (Results Pending)       ------------------------- NURSING NOTES AND VITALS REVIEWED ---------------------------  Date / Time Roomed:  2/21/2023 12:10 PM  ED Bed Assignment:  [id number]    The nursing notes within the ED encounter and vital signs as below have been reviewed.      Patient Vitals for the past 24 hrs:   BP Temp Temp src Pulse Resp SpO2   02/21/23 1900 90/64 -- -- 65 16 98 %   02/21/23 1848 98/71 -- -- 65 16 100 %   02/21/23 1844 107/77 -- -- 65 16 100 %   02/21/23 1800 101/70 -- -- 67 16 98 %   02/21/23 1745 99/68 -- -- 66 18 97 %   02/21/23 1730 104/67 -- -- 67 16 97 %   02/21/23 1715 100/67 -- -- 67 16 96 %   02/21/23 1700 100/69 -- -- 69 16 96 %   02/21/23 1645 102/71 -- -- 67 16 97 %   02/21/23 1630 102/72 -- -- 67 16 96 %   02/21/23 1615 103/67 -- -- 69 16 98 %   02/21/23 1607 -- -- -- 65 16 100 %   02/21/23 1600 101/70 -- -- 66 16 100 %   02/21/23 1545 100/66 -- -- 68 16 100 %   02/21/23 1530 99/62 -- -- 70 16 100 %   02/21/23 1515 (!) 95/59 -- -- 70 16 100 %   02/21/23 1500 98/60 -- -- 72 16 100 %   02/21/23 1445 100/66 -- -- 77 -- 100 %   02/21/23 1430 109/73 -- -- 81 26 99 %   02/21/23 1415 120/78 -- -- 87 18 100 %   02/21/23 1412 120/78 -- -- 89 22 100 %   02/21/23 1402 -- -- -- -- -- 100 %   02/21/23 1339 (!) 175/121 -- -- 81 22 100 %   02/21/23 1322 (!) 141/89 97.5 °F (36.4 °C) CORE 90 17 100 %   02/21/23 1311 (!) 180/104 -- -- 98 22 100 %   02/21/23 1300 (!) 201/119 97.5 °F (36.4 °C) CORE 100 22 100 %   02/21/23 1220 (!) 167/106 -- -- (!) 114 22 98 %       Oxygen Saturation Interpretation: Abnormal and Improved after treatment    ------------------------------------------ PROGRESS NOTES ------------------------------------------  Re-evaluation(s):  Time: 1500  Patients symptoms are not improving-intubated and sedated  Repeat physical examination is not changed    Counseling: Pt intubated and sedated, no family /caregiver with Pt.   --------------------------------- ADDITIONAL PROVIDER NOTES ---------------------------------  Consultations:  Time: 1168. Spoke with Hospitalist and ICU Physician. Discussed case. They will admit the patient. This patient's ED course included: a personal history and physicial examination, re-evaluation prior to disposition, multiple bedside re-evaluations, IV medications, cardiac monitoring, continuous pulse oximetry, and complex medical decision making and emergency management    This patient has remained hemodynamically stable during their ED course. Diagnosis:  1. Timoteo coma scale total score 3-8, at arrival to emergency department [facility])    2. Polysubstance abuse ([facility] [address]    3. Overdose of undetermined intent, initial encounter    4. Lactic acidosis    5. ETOH abuse        Disposition:  Patient's disposition: Admit to CCU/ICU  Patient's condition is serious.         Aidan Rai DO  Resident  02/21/23 5647

## 2023-02-21 NOTE — CARE COORDINATION
Social Work /Transition of Care:    Pt presented to the ED secondary to drug overdose behind the soup kitchen. Per report from Carole at the soup kitchen in [facility], pt was found outside behind the building unresponsive. Pt was given narcan. Pt was intubated upon arrival to the ED. SARBJIT spoke to pt's wife, Juventino Wade, over the phone, who states \"I have washed my hands of him a while ago\". Pt's wife told SARBJIT to contact pt's mother, Janet Garza ([phone]). SARBJIT spoke to pt's mother and notified her that pt was in the ED. Pt's mother was upset stating pt has \"been brought back after several overdoses\". 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 SARBJIT/NORMA to follow.

## 2023-02-21 NOTE — ED NOTES
Pt attempting to sit up in bed and pull out ETT. Versed drip started     Noellesa Lavern Dykes RN  02/21/23 8347

## 2023-02-22 ENCOUNTER — APPOINTMENT (OUTPATIENT)
Dept: GENERAL RADIOLOGY | Age: 53
DRG: 917 | End: 2023-02-22
Payer: MEDICAID

## 2023-02-22 PROBLEM — F14.10 COCAINE ABUSE (HCC): Status: ACTIVE | Noted: 2023-02-22

## 2023-02-22 PROBLEM — Z91.199 NONCOMPLIANCE: Status: ACTIVE | Noted: 2023-02-22

## 2023-02-22 PROBLEM — F11.10 NARCOTIC ABUSE (HCC): Status: ACTIVE | Noted: 2023-02-22

## 2023-02-22 PROBLEM — R40.2430 GLASGOW COMA SCALE TOTAL SCORE 3-8 (HCC): Status: ACTIVE | Noted: 2023-02-22

## 2023-02-22 PROBLEM — I10 HTN (HYPERTENSION): Status: ACTIVE | Noted: 2023-02-22

## 2023-02-22 PROBLEM — F19.10 POLYSUBSTANCE ABUSE (HCC): Status: ACTIVE | Noted: 2023-02-22

## 2023-02-22 PROBLEM — F12.10 MARIJUANA ABUSE: Status: ACTIVE | Noted: 2023-02-22

## 2023-02-22 PROBLEM — T50.901A ACCIDENTAL DRUG OVERDOSE: Status: ACTIVE | Noted: 2023-02-22

## 2023-02-22 LAB
AADO2: 150.9 MMHG
ALBUMIN SERPL-MCNC: 3.1 G/DL (ref 3.5–5.2)
ALBUMIN SERPL-MCNC: 3.4 G/DL (ref 3.5–5.2)
ALP BLD-CCNC: 36 U/L (ref 40–129)
ALP BLD-CCNC: 43 U/L (ref 40–129)
ALT SERPL-CCNC: 131 U/L (ref 0–40)
ALT SERPL-CCNC: 137 U/L (ref 0–40)
ANION GAP SERPL CALCULATED.3IONS-SCNC: 8 MMOL/L (ref 7–16)
ANION GAP SERPL CALCULATED.3IONS-SCNC: 8 MMOL/L (ref 7–16)
AST SERPL-CCNC: 104 U/L (ref 0–39)
AST SERPL-CCNC: 95 U/L (ref 0–39)
B.E.: -1.7 MMOL/L (ref -3–3)
BASOPHILS ABSOLUTE: 0.03 E9/L (ref 0–0.2)
BASOPHILS RELATIVE PERCENT: 0.5 % (ref 0–2)
BILIRUB SERPL-MCNC: 0.6 MG/DL (ref 0–1.2)
BILIRUB SERPL-MCNC: 0.8 MG/DL (ref 0–1.2)
BUN BLDV-MCNC: 10 MG/DL (ref 6–20)
BUN BLDV-MCNC: 11 MG/DL (ref 6–20)
CALCIUM SERPL-MCNC: 7.5 MG/DL (ref 8.6–10.2)
CALCIUM SERPL-MCNC: 7.9 MG/DL (ref 8.6–10.2)
CHLORIDE BLD-SCNC: 107 MMOL/L (ref 98–107)
CHLORIDE BLD-SCNC: 110 MMOL/L (ref 98–107)
CO2: 23 MMOL/L (ref 22–29)
CO2: 24 MMOL/L (ref 22–29)
COHB: 1.1 % (ref 0–1.5)
CREAT SERPL-MCNC: 0.9 MG/DL (ref 0.7–1.2)
CREAT SERPL-MCNC: 1 MG/DL (ref 0.7–1.2)
CRITICAL: ABNORMAL
DATE ANALYZED: ABNORMAL
DATE OF COLLECTION: ABNORMAL
EKG ATRIAL RATE: 97 BPM
EKG P AXIS: 74 DEGREES
EKG P-R INTERVAL: 122 MS
EKG Q-T INTERVAL: 370 MS
EKG QRS DURATION: 70 MS
EKG QTC CALCULATION (BAZETT): 469 MS
EKG R AXIS: 44 DEGREES
EKG T AXIS: 64 DEGREES
EKG VENTRICULAR RATE: 97 BPM
EOSINOPHILS ABSOLUTE: 0.03 E9/L (ref 0.05–0.5)
EOSINOPHILS RELATIVE PERCENT: 0.5 % (ref 0–6)
FIO2: 40 %
GFR SERPL CREATININE-BSD FRML MDRD: >60 ML/MIN/1.73
GFR SERPL CREATININE-BSD FRML MDRD: >60 ML/MIN/1.73
GLUCOSE BLD-MCNC: 125 MG/DL (ref 74–99)
GLUCOSE BLD-MCNC: 92 MG/DL (ref 74–99)
HCO3: 21.9 MMOL/L (ref 22–26)
HCT VFR BLD CALC: 35.3 % (ref 37–54)
HEMOGLOBIN: 12.3 G/DL (ref 12.5–16.5)
HHB: 3.6 % (ref 0–5)
HIV-1 AND HIV-2 ANTIBODIES: NORMAL
IMMATURE GRANULOCYTES #: 0.03 E9/L
IMMATURE GRANULOCYTES %: 0.5 % (ref 0–5)
LAB: ABNORMAL
LIPASE: 13 U/L (ref 13–60)
LYMPHOCYTES ABSOLUTE: 1.43 E9/L (ref 1.5–4)
LYMPHOCYTES RELATIVE PERCENT: 23.7 % (ref 20–42)
Lab: ABNORMAL
MAGNESIUM: 2.1 MG/DL (ref 1.6–2.6)
MCH RBC QN AUTO: 32.4 PG (ref 26–35)
MCHC RBC AUTO-ENTMCNC: 34.8 % (ref 32–34.5)
MCV RBC AUTO: 92.9 FL (ref 80–99.9)
METHB: 0.2 % (ref 0–1.5)
MODE: AC
MONOCYTES ABSOLUTE: 0.63 E9/L (ref 0.1–0.95)
MONOCYTES RELATIVE PERCENT: 10.4 % (ref 2–12)
NEUTROPHILS ABSOLUTE: 3.89 E9/L (ref 1.8–7.3)
NEUTROPHILS RELATIVE PERCENT: 64.4 % (ref 43–80)
O2 CONTENT: 17.8 ML/DL
O2 SATURATION: 96.4 % (ref 92–98.5)
O2HB: 95.1 % (ref 94–97)
OPERATOR ID: 2593
PATIENT TEMP: 37 C
PCO2: 33.9 MMHG (ref 35–45)
PDW BLD-RTO: 14.8 FL (ref 11.5–15)
PEEP/CPAP: 5 CMH2O
PFO2: 2.13 MMHG/%
PH BLOOD GAS: 7.43 (ref 7.35–7.45)
PHOSPHORUS: 2.1 MG/DL (ref 2.5–4.5)
PHOSPHORUS: 2.1 MG/DL (ref 2.5–4.5)
PLATELET # BLD: 138 E9/L (ref 130–450)
PMV BLD AUTO: 9.8 FL (ref 7–12)
PO2: 85.3 MMHG (ref 75–100)
POTASSIUM SERPL-SCNC: 3.5 MMOL/L (ref 3.5–5)
POTASSIUM SERPL-SCNC: 4.4 MMOL/L (ref 3.5–5)
PROCALCITONIN: 0.16 NG/ML (ref 0–0.08)
RBC # BLD: 3.8 E12/L (ref 3.8–5.8)
RI(T): 1.77
RR MECHANICAL: 24 B/MIN
SODIUM BLD-SCNC: 139 MMOL/L (ref 132–146)
SODIUM BLD-SCNC: 141 MMOL/L (ref 132–146)
SOURCE, BLOOD GAS: ABNORMAL
THB: 13.3 G/DL (ref 11.5–16.5)
TIME ANALYZED: 430
TOTAL CK: 211 U/L (ref 20–200)
TOTAL PROTEIN: 5.3 G/DL (ref 6.4–8.3)
TOTAL PROTEIN: 6 G/DL (ref 6.4–8.3)
TRIGL SERPL-MCNC: 60 MG/DL (ref 0–149)
VT MECHANICAL: 450 ML
WBC # BLD: 6 E9/L (ref 4.5–11.5)

## 2023-02-22 PROCEDURE — 71045 X-RAY EXAM CHEST 1 VIEW: CPT

## 2023-02-22 PROCEDURE — 2580000003 HC RX 258: Performed by: INTERNAL MEDICINE

## 2023-02-22 PROCEDURE — 2500000003 HC RX 250 WO HCPCS: Performed by: INTERNAL MEDICINE

## 2023-02-22 PROCEDURE — 84145 PROCALCITONIN (PCT): CPT

## 2023-02-22 PROCEDURE — 85025 COMPLETE CBC W/AUTO DIFF WBC: CPT

## 2023-02-22 PROCEDURE — 6360000002 HC RX W HCPCS: Performed by: INTERNAL MEDICINE

## 2023-02-22 PROCEDURE — 93010 ELECTROCARDIOGRAM REPORT: CPT | Performed by: INTERNAL MEDICINE

## 2023-02-22 PROCEDURE — 6370000000 HC RX 637 (ALT 250 FOR IP): Performed by: INTERNAL MEDICINE

## 2023-02-22 PROCEDURE — 2000000000 HC ICU R&B

## 2023-02-22 PROCEDURE — 84100 ASSAY OF PHOSPHORUS: CPT

## 2023-02-22 PROCEDURE — 80074 ACUTE HEPATITIS PANEL: CPT

## 2023-02-22 PROCEDURE — 83735 ASSAY OF MAGNESIUM: CPT

## 2023-02-22 PROCEDURE — 6360000002 HC RX W HCPCS: Performed by: FAMILY MEDICINE

## 2023-02-22 PROCEDURE — 2500000003 HC RX 250 WO HCPCS

## 2023-02-22 PROCEDURE — 86703 HIV-1/HIV-2 1 RESULT ANTBDY: CPT

## 2023-02-22 PROCEDURE — 82805 BLOOD GASES W/O2 SATURATION: CPT

## 2023-02-22 PROCEDURE — 6370000000 HC RX 637 (ALT 250 FOR IP): Performed by: FAMILY MEDICINE

## 2023-02-22 PROCEDURE — 83690 ASSAY OF LIPASE: CPT

## 2023-02-22 PROCEDURE — 6370000000 HC RX 637 (ALT 250 FOR IP)

## 2023-02-22 PROCEDURE — 82550 ASSAY OF CK (CPK): CPT

## 2023-02-22 PROCEDURE — C9113 INJ PANTOPRAZOLE SODIUM, VIA: HCPCS | Performed by: INTERNAL MEDICINE

## 2023-02-22 PROCEDURE — 80053 COMPREHEN METABOLIC PANEL: CPT

## 2023-02-22 PROCEDURE — 94003 VENT MGMT INPAT SUBQ DAY: CPT

## 2023-02-22 PROCEDURE — 2580000003 HC RX 258: Performed by: FAMILY MEDICINE

## 2023-02-22 PROCEDURE — 99291 CRITICAL CARE FIRST HOUR: CPT | Performed by: INTERNAL MEDICINE

## 2023-02-22 PROCEDURE — 2700000000 HC OXYGEN THERAPY PER DAY

## 2023-02-22 PROCEDURE — 2580000003 HC RX 258

## 2023-02-22 PROCEDURE — 94640 AIRWAY INHALATION TREATMENT: CPT

## 2023-02-22 PROCEDURE — HZ2ZZZZ DETOXIFICATION SERVICES FOR SUBSTANCE ABUSE TREATMENT: ICD-10-PCS | Performed by: INTERNAL MEDICINE

## 2023-02-22 RX ORDER — FOLIC ACID 5 MG/ML
1 INJECTION, SOLUTION INTRAMUSCULAR; INTRAVENOUS; SUBCUTANEOUS DAILY
Status: DISCONTINUED | OUTPATIENT
Start: 2023-02-22 | End: 2023-02-22

## 2023-02-22 RX ORDER — LORAZEPAM 1 MG/1
4 TABLET ORAL
Status: DISCONTINUED | OUTPATIENT
Start: 2023-02-22 | End: 2023-02-23

## 2023-02-22 RX ORDER — LORAZEPAM 2 MG/ML
3 CONCENTRATE ORAL
Status: DISCONTINUED | OUTPATIENT
Start: 2023-02-22 | End: 2023-02-23

## 2023-02-22 RX ORDER — LORAZEPAM 2 MG/ML
4 CONCENTRATE ORAL
Status: DISCONTINUED | OUTPATIENT
Start: 2023-02-22 | End: 2023-02-23

## 2023-02-22 RX ORDER — LORAZEPAM 1 MG/1
2 TABLET ORAL
Status: DISCONTINUED | OUTPATIENT
Start: 2023-02-22 | End: 2023-02-23

## 2023-02-22 RX ORDER — PHENOBARBITAL 32.4 MG/1
64.8 TABLET ORAL 3 TIMES DAILY
Status: DISCONTINUED | OUTPATIENT
Start: 2023-02-22 | End: 2023-02-23 | Stop reason: ALTCHOICE

## 2023-02-22 RX ORDER — LORAZEPAM 2 MG/ML
1 CONCENTRATE ORAL
Status: DISCONTINUED | OUTPATIENT
Start: 2023-02-22 | End: 2023-02-23

## 2023-02-22 RX ORDER — LORAZEPAM 2 MG/ML
2 CONCENTRATE ORAL
Status: DISCONTINUED | OUTPATIENT
Start: 2023-02-22 | End: 2023-02-23

## 2023-02-22 RX ORDER — FOLIC ACID 1 MG/1
1 TABLET ORAL DAILY
Status: DISCONTINUED | OUTPATIENT
Start: 2023-02-22 | End: 2023-03-01 | Stop reason: HOSPADM

## 2023-02-22 RX ORDER — LORAZEPAM 1 MG/1
1 TABLET ORAL
Status: DISCONTINUED | OUTPATIENT
Start: 2023-02-22 | End: 2023-02-23

## 2023-02-22 RX ORDER — MAGNESIUM SULFATE 1 G/100ML
1000 INJECTION INTRAVENOUS ONCE
Status: COMPLETED | OUTPATIENT
Start: 2023-02-22 | End: 2023-02-22

## 2023-02-22 RX ORDER — PANTOPRAZOLE SODIUM 40 MG/1
40 TABLET, DELAYED RELEASE ORAL
Status: DISCONTINUED | OUTPATIENT
Start: 2023-02-23 | End: 2023-03-01 | Stop reason: HOSPADM

## 2023-02-22 RX ORDER — LORAZEPAM 1 MG/1
3 TABLET ORAL
Status: DISCONTINUED | OUTPATIENT
Start: 2023-02-22 | End: 2023-02-23

## 2023-02-22 RX ADMIN — IPRATROPIUM BROMIDE AND ALBUTEROL SULFATE 1 AMPULE: 2.5; .5 SOLUTION RESPIRATORY (INHALATION) at 16:16

## 2023-02-22 RX ADMIN — Medication 1 LOZENGE: at 23:49

## 2023-02-22 RX ADMIN — THIAMINE HYDROCHLORIDE 500 MG: 200 INJECTION, SOLUTION INTRAMUSCULAR; INTRAVENOUS at 04:29

## 2023-02-22 RX ADMIN — FOLIC ACID 1 MG: 1 TABLET ORAL at 19:41

## 2023-02-22 RX ADMIN — IPRATROPIUM BROMIDE AND ALBUTEROL SULFATE 1 AMPULE: 2.5; .5 SOLUTION RESPIRATORY (INHALATION) at 19:57

## 2023-02-22 RX ADMIN — PHENOBARBITAL 64.8 MG: 32.4 TABLET ORAL at 21:22

## 2023-02-22 RX ADMIN — POTASSIUM PHOSPHATE, MONOBASIC AND POTASSIUM PHOSPHATE, DIBASIC 15 MMOL: 224; 236 INJECTION, SOLUTION, CONCENTRATE INTRAVENOUS at 07:30

## 2023-02-22 RX ADMIN — ENOXAPARIN SODIUM 40 MG: 100 INJECTION SUBCUTANEOUS at 08:53

## 2023-02-22 RX ADMIN — THIAMINE HYDROCHLORIDE 500 MG: 200 INJECTION, SOLUTION INTRAMUSCULAR; INTRAVENOUS at 21:30

## 2023-02-22 RX ADMIN — MAGNESIUM SULFATE HEPTAHYDRATE 1000 MG: 1 INJECTION, SOLUTION INTRAVENOUS at 02:46

## 2023-02-22 RX ADMIN — FOLIC ACID 1 MG: 5 INJECTION, SOLUTION INTRAMUSCULAR; INTRAVENOUS; SUBCUTANEOUS at 14:10

## 2023-02-22 RX ADMIN — Medication 1 LOZENGE: at 21:26

## 2023-02-22 RX ADMIN — LORAZEPAM 2 MG: 1 TABLET ORAL at 19:41

## 2023-02-22 RX ADMIN — PANTOPRAZOLE SODIUM 40 MG: 40 INJECTION, POWDER, FOR SOLUTION INTRAVENOUS at 08:54

## 2023-02-22 RX ADMIN — SODIUM PHOSPHATE, MONOBASIC, MONOHYDRATE AND SODIUM PHOSPHATE, DIBASIC, ANHYDROUS 15 MMOL: 276; 142 INJECTION, SOLUTION INTRAVENOUS at 21:48

## 2023-02-22 RX ADMIN — SODIUM CHLORIDE, PRESERVATIVE FREE 10 ML: 5 INJECTION INTRAVENOUS at 21:36

## 2023-02-22 RX ADMIN — THIAMINE HYDROCHLORIDE 500 MG: 200 INJECTION, SOLUTION INTRAMUSCULAR; INTRAVENOUS at 11:26

## 2023-02-22 RX ADMIN — IPRATROPIUM BROMIDE AND ALBUTEROL SULFATE 1 AMPULE: 2.5; .5 SOLUTION RESPIRATORY (INHALATION) at 11:55

## 2023-02-22 RX ADMIN — IPRATROPIUM BROMIDE AND ALBUTEROL SULFATE 1 AMPULE: 2.5; .5 SOLUTION RESPIRATORY (INHALATION) at 08:29

## 2023-02-22 RX ADMIN — LORAZEPAM 2 MG: 1 TABLET ORAL at 16:28

## 2023-02-22 RX ADMIN — ACETAMINOPHEN 650 MG: 325 TABLET ORAL at 23:59

## 2023-02-22 RX ADMIN — SODIUM CHLORIDE, PRESERVATIVE FREE 10 ML: 5 INJECTION INTRAVENOUS at 08:54

## 2023-02-22 ASSESSMENT — PULMONARY FUNCTION TESTS
PIF_VALUE: 29
PIF_VALUE: 28
PIF_VALUE: 29
PIF_VALUE: 28
PIF_VALUE: 28
PIF_VALUE: 24
PIF_VALUE: 28
PIF_VALUE: 34
PIF_VALUE: 28
PIF_VALUE: 29
PIF_VALUE: 28

## 2023-02-22 ASSESSMENT — PAIN DESCRIPTION - PAIN TYPE: TYPE: ACUTE PAIN

## 2023-02-22 ASSESSMENT — PAIN DESCRIPTION - DESCRIPTORS: DESCRIPTORS: ACHING;DISCOMFORT;SORE;DULL

## 2023-02-22 ASSESSMENT — PAIN SCALES - GENERAL
PAINLEVEL_OUTOF10: 0
PAINLEVEL_OUTOF10: 9
PAINLEVEL_OUTOF10: 0
PAINLEVEL_OUTOF10: 0
PAINLEVEL_OUTOF10: 3
PAINLEVEL_OUTOF10: 0
PAINLEVEL_OUTOF10: 0

## 2023-02-22 ASSESSMENT — PAIN DESCRIPTION - LOCATION
LOCATION: GENERALIZED
LOCATION: HEAD

## 2023-02-22 ASSESSMENT — PAIN DESCRIPTION - ORIENTATION: ORIENTATION: MID

## 2023-02-22 NOTE — PLAN OF CARE
Problem: Safety - Medical Restraint  Goal: Remains free of injury from restraints (Restraint for Interference with Medical Device)  Description: INTERVENTIONS:  1. Determine that other, less restrictive measures have been tried or would not be effective before applying the restraint  2. Evaluate the patient's condition at the time of restraint application  3. Inform patient/family regarding the reason for restraint  4. Q2H: Monitor safety, psychosocial status, comfort, nutrition and hydration  Outcome: Progressing  Flowsheets (Taken 2/21/2023 2000)  Remains free of injury from restraints (restraint for interference with medical device):   Determine that other, less restrictive measures have been tried or would not be effective before applying the restraint   Evaluate the patient's condition at the time of restraint application   Inform patient/family regarding the reason for restraint   Every 2 hours: Monitor safety, psychosocial status, comfort, nutrition and hydration     Problem: Discharge Planning  Goal: Discharge to home or other facility with appropriate resources  Outcome: Progressing     Problem: Pain  Goal: Verbalizes/displays adequate comfort level or baseline comfort level  Outcome: Progressing     Problem: Safety - Adult  Goal: Free from fall injury  Outcome: Progressing  Flowsheets (Taken 2/21/2023 2000)  Free From Fall Injury: Instruct family/caregiver on patient safety     Problem: Neurosensory - Adult  Goal: Achieves stable or improved neurological status  Outcome: Progressing  Flowsheets (Taken 2/21/2023 2000)  Achieves stable or improved neurological status:   Assess for and report changes in neurological status   Initiate measures to prevent increased intracranial pressure   Maintain blood pressure and fluid volume within ordered parameters to optimize cerebral perfusion and minimize risk of hemorrhage   Monitor temperature, glucose, and sodium.  Initiate appropriate interventions as ordered  Goal: Achieves maximal functionality and self care  Outcome: Progressing  Flowsheets (Taken 2/21/2023 2000)  Achieves maximal functionality and self care:   Monitor swallowing and airway patency with patient fatigue and changes in neurological status   Encourage and assist patient to increase activity and self care with guidance from physical therapy/occupational therapy   Encourage visually impaired, hearing impaired and aphasic patients to use assistive/communication devices     Problem: Respiratory - Adult  Goal: Achieves optimal ventilation and oxygenation  Outcome: Progressing  Flowsheets (Taken 2/21/2023 2000)  Achieves optimal ventilation and oxygenation:   Assess for changes in respiratory status   Assess for changes in mentation and behavior   Position to facilitate oxygenation and minimize respiratory effort   Encourage broncho-pulmonary hygiene including cough, deep breathe, incentive spirometry   Assess and instruct to report shortness of breath or any respiratory difficulty   Assess the need for suctioning and aspirate as needed   Respiratory therapy support as indicated   Oxygen supplementation based on oxygen saturation or arterial blood gases     Problem: Cardiovascular - Adult  Goal: Maintains optimal cardiac output and hemodynamic stability  Outcome: Progressing  Flowsheets (Taken 2/21/2023 2000)  Maintains optimal cardiac output and hemodynamic stability:   Monitor blood pressure and heart rate   Monitor urine output and notify Licensed Independent Practitioner for values outside of normal range   Assess for signs of decreased cardiac output   Administer fluid and/or volume expanders as ordered   Administer vasoactive medications as ordered  Goal: Absence of cardiac dysrhythmias or at baseline  Outcome: Progressing  Flowsheets (Taken 2/21/2023 2000)  Absence of cardiac dysrhythmias or at baseline:   Monitor cardiac rate and rhythm   Assess for signs of decreased cardiac output Administer antiarrhythmia medication and electrolyte replacement as ordered     Problem: Musculoskeletal - Adult  Goal: Return mobility to safest level of function  Outcome: Progressing  Flowsheets (Taken 2/21/2023 2000)  Return Mobility to Safest Level of Function:   Assess patient stability and activity tolerance for standing, transferring and ambulating with or without assistive devices   Ensure adequate protection for wounds/incisions during mobilization   Assist with transfers and ambulation using safe patient handling equipment as needed   Apply continuous passive motion per provider or physical therapy orders to increase flexion toward goal   Obtain physical therapy/occupational therapy consults as needed   Instruct patient/family in ordered activity level  Goal: Maintain proper alignment of affected body part  Outcome: Progressing  Flowsheets (Taken 2/21/2023 2000)  Maintain proper alignment of affected body part:   Support and protect limb and body alignment per provider's orders   Instruct and reinforce with patient and family use of appropriate assistive device and precautions (e.g. spinal or hip dislocation precautions)  Goal: Return ADL status to a safe level of function  Outcome: Progressing  Flowsheets (Taken 2/21/2023 2000)  Return ADL Status to a Safe Level of Function:   Administer medication as ordered   Assess activities of daily living deficits and provide assistive devices as needed   Obtain physical therapy/occupational therapy consults as needed   Assist and instruct patient to increase activity and self care as tolerated     Problem: Gastrointestinal - Adult  Goal: Minimal or absence of nausea and vomiting  Outcome: Progressing  Flowsheets (Taken 2/21/2023 2000)  Minimal or absence of nausea and vomiting:   Administer IV fluids as ordered to ensure adequate hydration   Maintain NPO status until nausea and vomiting are resolved   Administer ordered antiemetic medications as needed Nasogastric tube to low intermittent suction as ordered   Advance diet as tolerated, if ordered   Provide nonpharmacologic comfort measures as appropriate   Nutrition consult to assist patient with adequate nutrition and appropriate food choices  Goal: Maintains or returns to baseline bowel function  Outcome: Progressing  Flowsheets (Taken 2/21/2023 2000)  Maintains or returns to baseline bowel function:   Assess bowel function   Encourage oral fluids to ensure adequate hydration   Administer ordered medications as needed   Encourage mobilization and activity   Nutrition consult to assist patient with appropriate food choices   Administer IV fluids as ordered to ensure adequate hydration  Goal: Maintains adequate nutritional intake  Outcome: Progressing  Flowsheets (Taken 2/21/2023 2000)  Maintains adequate nutritional intake:   Monitor percentage of each meal consumed   Identify factors contributing to decreased intake, treat as appropriate   Assist with meals as needed   Monitor intake and output, weight and lab values   Obtain nutritional consult as needed     Problem: Genitourinary - Adult  Goal: Absence of urinary retention  Outcome: Progressing  Flowsheets (Taken 2/21/2023 2000)  Absence of urinary retention:   Assess patients ability to void and empty bladder   Monitor intake/output and perform bladder scan as needed   Place urinary catheter per Licensed Independent Practitioner order if needed   Discuss with Licensed Independent Practitioner  medications to alleviate retention as needed   Discuss catheterization for long term situations as appropriate  Goal: Urinary catheter remains patent  Outcome: Progressing  Flowsheets (Taken 2/21/2023 2000)  Urinary catheter remains patent:   Assess patency of urinary catheter   Irrigate catheter per Licensed Independent Practitioner order if indicated and notify Licensed Independent Practitioner if unable to irrigate   Assess need for a larger catheter size or a 3-way catheter for continuous bladder irrigation     Problem: ABCDS Injury Assessment  Goal: Absence of physical injury  Outcome: Progressing  Flowsheets (Taken 2/21/2023 2000)  Absence of Physical Injury: Implement safety measures based on patient assessment     Problem: Skin/Tissue Integrity  Goal: Absence of new skin breakdown  Description: 1. Monitor for areas of redness and/or skin breakdown  2. Assess vascular access sites hourly  3. Every 4-6 hours minimum:  Change oxygen saturation probe site  4. Every 4-6 hours:  If on nasal continuous positive airway pressure, respiratory therapy assess nares and determine need for appliance change or resting period.   Outcome: Progressing

## 2023-02-22 NOTE — PLAN OF CARE
Problem: Safety - Medical Restraint  Goal: Remains free of injury from restraints (Restraint for Interference with Medical Device)  Description: INTERVENTIONS:  1. Determine that other, less restrictive measures have been tried or would not be effective before applying the restraint  2. Evaluate the patient's condition at the time of restraint application  3. Inform patient/family regarding the reason for restraint  4. Q2H: Monitor safety, psychosocial status, comfort, nutrition and hydration  2/22/2023 0812 by Mauricio Hay RN  Outcome: Progressing  Flowsheets (Taken 2/22/2023 0800)  Remains free of injury from restraints (restraint for interference with medical device): Every 2 hours: Monitor safety, psychosocial status, comfort, nutrition and hydration     Problem: Pain  Goal: Verbalizes/displays adequate comfort level or baseline comfort level  2/22/2023 0812 by Mauricio Hay RN  Outcome: Progressing     Problem: Safety - Adult  Goal: Free from fall injury  2/22/2023 0812 by Mauricio Hay RN  Outcome: Progressing     Problem: Respiratory - Adult  Goal: Achieves optimal ventilation and oxygenation  2/22/2023 0812 by Mauricio Hay RN  Outcome: Progressing     Problem: Genitourinary - Adult  Goal: Absence of urinary retention  2/22/2023 0019 by Mauricio Hay RN  Outcome: Progressing     Problem: ABCDS Injury Assessment  Goal: Absence of physical injury  2/22/2023 3336 by Mauricio Hay RN  Outcome: Progressing     Problem: Skin/Tissue Integrity  Goal: Absence of new skin breakdown  Description: 1. Monitor for areas of redness and/or skin breakdown  2. Assess vascular access sites hourly  3. Every 4-6 hours minimum:  Change oxygen saturation probe site  4. Every 4-6 hours:  If on nasal continuous positive airway pressure, respiratory therapy assess nares and determine need for appliance change or resting period.   2/22/2023 0769 by Mauricio Hay RN  Outcome: Progressing   Plan of care discussed with patient / family.

## 2023-02-22 NOTE — PLAN OF CARE
Problem: Safety - Medical Restraint  Goal: Remains free of injury from restraints (Restraint for Interference with Medical Device)  Description: INTERVENTIONS:  1. Determine that other, less restrictive measures have been tried or would not be effective before applying the restraint  2. Evaluate the patient's condition at the time of restraint application  3. Inform patient/family regarding the reason for restraint  4.  Q2H: Monitor safety, psychosocial status, comfort, nutrition and hydration  2/22/2023 0513 by Jasen Redding RN  Outcome: Progressing  Flowsheets (Taken 2/22/2023 0400)  Remains free of injury from restraints (restraint for interference with medical device):   Determine that other, less restrictive measures have been tried or would not be effective before applying the restraint   Inform patient/family regarding the reason for restraint   Evaluate the patient's condition at the time of restraint application   Every 2 hours: Monitor safety, psychosocial status, comfort, nutrition and hydration

## 2023-02-22 NOTE — CONSULTS
[id number] [facility]  Internal Medicine Residency Program  History and Physical  MICU    Patient:  Suleiman Smith 45 y.o. male MRN: [id number]     Date of Service: 2/21/2023    Hospital Day: 1      Chief complaint: Concern for drug overdose  History of Present Illness   The patient is a 45 y.o. male with past medical history of paroxysmal atrial fibrillation, multiple substance abuse, psychiatric conditions, suicidal ideation who was brought to the ED by EMS. EMS received a call the patient was found behind the [employer] kitchen unresponsive. Per patient's mother patient used to work at [employer]. EMS found the patient, Narcan him brought the patient to the ED where patient was intubated. After intubation patient was very agitated and he was sedated. Hernan Vines He was admitted for drug overdose. Per patient's mother, the patient has been to multiple rehab programs but relapsed every time. Patient has been into the hospital multiple times for drug overdose. ED Course: In the ED the patient was intubated and sedation was started. CT of the head without contrast showed no acute intracranial abnormality, no acute intracranial hemorrhage. CT cervical spine without contrast showed no acute compression fracture or subluxation, chest x-ray showed no acute cardiopulmonary process. Patient reported evaluation revealed normal INR no leukocytosis normal H&H, normal electrolytes and kidney function, elevation in ALT AST at 185/167, urinalysis negative, lactic acidosis 8.7 and repeat of 2.9 after receiving IV fluids. Patient serum drug screen revealed ethanol level of 75, urine drug screen was positive for cannabinoids, cocaine and fentanyl. Patient total CK is 280. When patient came in the unit, the patient was heavily sedated with propofol and Versed at and intubated.     Past Medical History:      Diagnosis Date    Atrial fibrillation ([address]     Hypertension        Past Surgical History: Procedure Laterality Date    ANGIOPLASTY      HAND SURGERY Left     pins in lt hand       Medications Prior to Admission:    Prior to Admission medications    Not on File       Allergies:  Red dye    Social History:   TOBACCO:   reports that he has been smoking cigarettes. He started smoking about 37 years ago. He has been smoking an average of .5 packs per day. He has never used smokeless tobacco.  ETOH:   reports current alcohol use. OCCUPATION:      Family History:       Adopted: Yes       REVIEW OF SYSTEMS:    Constitutional: No fever, no chills, no change in weight; good appetite  HEENT: No blurred vision, no ear problems, no sore throat, no rhinorrhea. Respiratory: No cough, no sputum production, no pleuritic chest pain, no shortness of breath  Cardiology: No angina, no dyspnea on exertion, no paroxysmal nocturnal dyspnea, no orthopnea, no palpitation, no leg swelling. Gastroenterology: No dysphagia, no reflux; no abdominal pain, no nausea or vomiting; no constipation or diarrhea. No hematochezia   Genitourinary: No dysuria, no frequency, hesitancy; no hematuria  Musculoskeletal: no joint pain, no myalgia, no change in range of movement  Neurology: no focal weakness in extremities, no slurred speech, no double vision, no tingling or numbness sensation  Endocrinology: no temperature intolerance, no polyphagia, polydipsia or polyuria  Hematology: no increased bleeding, no bruising, no lymphadenopathy  Skin: no skin changes noticed by patient  Psychology: no depressed mood, no suicidal ideation    Physical Exam   Vitals: BP 94/65   Pulse 63   Temp 98.6 °F (37 °C) (Bladder)   Resp 24   SpO2 99%       Constitutional: Intubated and sedated. Head: Normocephalic and atraumatic. Eyes: Conjunctiva normal, (-) scleral icterus. Mucus membranes moist.  Mouth: ETT in mouth. Neck: (-)  swelling,   Respiratory: Lungs clear to auscultation bilaterally. (-)  wheezes, (-)  rales, (-)  rhonchi. Cardiovascular: RRR. (-)  murmurs, (-) gallops,  (-) rubs. S1 and S2 were normal.   GI:  Abdomen soft, non-tender, non-distended. (+) BS. (-) guarding, (-) rigidity. Extremities: Warm and well perfused. (-) clubbing, (-) cyanosis. (-) peripheral edema. Neurologic: Intubated and sedated. Lines    Peripheral lines     Oxygen:      Mechanical Ventilation:  Mode: A/C   TV: 450 ml RR: 24  PEEP 5 cmH2O FiO2 40%    ABG:     Lab Results   Component Value Date/Time    PH 7.401 02/21/2023 08:51 PM    PCO2 35.5 02/21/2023 08:51 PM    PO2 87.4 02/21/2023 08:51 PM    HCO3 21.5 02/21/2023 08:51 PM    BE -2.6 02/21/2023 08:51 PM    THB 14.1 02/21/2023 08:51 PM    O2SAT 96.3 02/21/2023 08:51 PM     Labs and Imaging Studies   Basic Labs  CBC:   Lab Results   Component Value Date/Time    WBC 9.9 02/21/2023 08:38 PM    RBC 3.99 02/21/2023 08:38 PM    HGB 13.3 02/21/2023 08:38 PM    HCT 38.4 02/21/2023 08:38 PM    MCV 96.2 02/21/2023 08:38 PM    RDW 14.6 02/21/2023 08:38 PM     02/21/2023 08:38 PM     BMP:    Lab Results   Component Value Date/Time     02/21/2023 08:38 PM    K 4.6 02/21/2023 08:38 PM     02/21/2023 08:38 PM    CO2 27 02/21/2023 08:38 PM    BUN 9 02/21/2023 08:38 PM       Imaging Studies:     CT HEAD WO CONTRAST    Result Date: 2/21/2023  EXAMINATION: CT OF THE HEAD WITHOUT CONTRAST  2/21/2023 1:46 pm TECHNIQUE: CT of the head was performed without the administration of intravenous contrast. Automated exposure control, iterative reconstruction, and/or weight based adjustment of the mA/kV was utilized to reduce the radiation dose to as low as reasonably achievable. COMPARISON: None. HISTORY: ORDERING SYSTEM PROVIDED HISTORY: ams TECHNOLOGIST PROVIDED HISTORY: Has a \"code stroke\" or \"stroke alert\" been called? ->No Reason for exam:->ams Decision Support Exception - unselect if not a suspected or confirmed emergency medical condition->Emergency Medical Condition (MA) What reading provider will be dictating this exam?->CRC FINDINGS: BRAIN/VENTRICLES: There is no acute intracranial hemorrhage, mass effect or midline shift. No abnormal extra-axial fluid collection. The gray-white differentiation is maintained without evidence of an acute infarct. There is no evidence of hydrocephalus. ORBITS: The visualized portion of the orbits demonstrate no acute abnormality. SINUSES: The visualized paranasal sinuses and mastoid air cells demonstrate no acute abnormality. There is right maxillary and ethmoid sinus disease. SOFT TISSUES/SKULL:  No acute abnormality of the visualized skull or soft tissues. No acute intracranial abnormality. Specifically, there is no acute intracranial hemorrhage Right maxillary and bilateral ethmoid sinus disease. CT HEAD WO CONTRAST    Result Date: 2/17/2023  EXAMINATION: CT OF THE HEAD WITHOUT CONTRAST  2/17/2023 11:07 pm TECHNIQUE: CT of the head was performed without the administration of intravenous contrast. Automated exposure control, iterative reconstruction, and/or weight based adjustment of the mA/kV was utilized to reduce the radiation dose to as low as reasonably achievable. COMPARISON: May 25, 2018 HISTORY: ORDERING SYSTEM PROVIDED HISTORY: confusion TECHNOLOGIST PROVIDED HISTORY: Reason for exam:->confusion Has a \"code stroke\" or \"stroke alert\" been called? ->No Decision Support Exception - unselect if not a suspected or confirmed emergency medical condition->Emergency Medical Condition (MA) What reading provider will be dictating this exam?->CRC FINDINGS: BRAIN/VENTRICLES: There is no acute intracranial hemorrhage, mass effect or midline shift. No abnormal extra-axial fluid collection. The gray-white differentiation is maintained without evidence of an acute infarct. There is no evidence of hydrocephalus. ORBITS: The visualized portion of the orbits demonstrate no acute abnormality. SINUSES: The visualized paranasal sinuses and mastoid air cells demonstrate no acute abnormality. Inferior right maxillary sinus mucosal thickening. SOFT TISSUES/SKULL:  No acute abnormality of the visualized skull or soft tissues. No acute intracranial abnormality. RECOMMENDATIONS: Careful clinical correlation and follow up recommended. CT CERVICAL SPINE WO CONTRAST    Result Date: 2/21/2023  EXAMINATION: CT OF THE CERVICAL SPINE WITHOUT CONTRAST 2/21/2023 1:46 pm TECHNIQUE: CT of the cervical spine was performed without the administration of intravenous contrast. Multiplanar reformatted images are provided for review. Automated exposure control, iterative reconstruction, and/or weight based adjustment of the mA/kV was utilized to reduce the radiation dose to as low as reasonably achievable. COMPARISON: None. HISTORY: ORDERING SYSTEM PROVIDED HISTORY: trauma TECHNOLOGIST PROVIDED HISTORY: Reason for exam:->trauma Decision Support Exception - unselect if not a suspected or confirmed emergency medical condition->Emergency Medical Condition (MA) What reading provider will be dictating this exam?->CRC FINDINGS: The ring of C1 is intact as is the dense. There is no compression fracture of the cervical spine. No jumped or perched facet is noted. Multilevel degenerative disc and degenerative joint disease is noted. The prevertebral soft tissues are unremarkable. The airway is widely patent. Images through the lung apices are negative for a pneumothorax. 1. There is no acute compression fracture or subluxation of the cervical spine. 2. Multilevel degenerative disc and degenerative joint disease. XR CHEST PORTABLE    Result Date: 2/21/2023  EXAMINATION: ONE XRAY VIEW OF THE CHEST 2/21/2023 1:17 pm COMPARISON: 17 February 2023 HISTORY: ORDERING SYSTEM PROVIDED HISTORY: weakness, Possible Stroke TECHNOLOGIST PROVIDED HISTORY: Reason for exam:->weakness, Possible Stroke What reading provider will be dictating this exam?->CRC FINDINGS: Endotracheal tube is in the high position anterior to T1 vertebra. The lungs are without acute focal process. There is no effusion or pneumothorax. The cardiomediastinal silhouette is without acute process. The osseous structures are without acute process. High position of endotracheal tube. No acute process. XR CHEST PORTABLE    Result Date: 2/17/2023  EXAMINATION: ONE XRAY VIEW OF THE CHEST 2/17/2023 7:44 pm COMPARISON: Chest two view, 06/20/2007 HISTORY: ORDERING SYSTEM PROVIDED HISTORY: hypoxia TECHNOLOGIST PROVIDED HISTORY: Reason for exam:->hypoxia What reading provider will be dictating this exam?->CRC FINDINGS: Lines, tubes, and devices: None. Lungs and pleura: No focal consolidation. No pleural effusion. No pneumothorax. Cardiomediastinal silhouette: The heart size is normal. Bones and soft tissues: Unremarkable. No acute process. Stable exam.       Resident's Assessment and Plan     Acute metabolic encephalopathy 2/2 most likely drug overdose  The patient's UDS is positive for cocaine, fentanyl and cannabinoids. The patient has history of multiple drug overdoses. Wean off sedation as possible. Wean off of ventilator. SAT and SBT daily. Patient has already received Narcan on the field. Cardiology:   History of atrial fibrillation  There is a documentation of remote treatment with atrial fibrillation. No recent treatment. Monitor patient's heart rate and rhythm. Pulmonary:   Acute hypoxic respiratory failure 2/2 drug overdose  The patient is mechanically ventilated. SBT and SAT daily. Nephrology (Fluids/ Electrolytes & Nutrition):   High anion gap metabolic acidosis 2/2 lactic acidosis-improving  The patient lactic acidosis has dropped from 8-2.9. Continue fluid resuscitation. BMP daily. Gastroenterology:   Transaminitis  The patient's AST and ALT is elevated. Follow hepatitis C panel, and HIV panel. Code Status:   Full code    PT/OT: not indicated at this time.   DVT ppx: Lovenox  GI ppx: PPI      Urszula Stubbs MD, PGY-1   Attending physician: Dr. Kameron Morse

## 2023-02-22 NOTE — PROGRESS NOTES
[address] [facility]  Department of Internal Medicine   Internal Medicine Residency   MICU Progress Note    Patient:  Cinda Shipman 45 y.o. male  MRN: [id number]     Date of Service: 2/22/2023    Allergy: Red dye    Subjective     I have seen and examine the patient at bed side. Patient was intubated and sedated in the morning. Later turned off sedation, patient started to response. Later extubated the patient. After couple of hours, patient wanted to know why he was brought to the hospital. Helped the patient to understand what happened to him. Patient agrees that he was admitted multiple times in the past for drug over dose, but this time he couldn't remember what happened. Patient was reassured. He wanted to drink milk, given.    Objective     VS: BP (!) 145/87   Pulse 75   Temp 98.2 °F (36.8 °C) (Oral)   Resp 17   Wt 193 lb 9.6 oz (87.8 kg)   SpO2 100%   BMI 27.78 kg/m²           I & O - 24hr:   Intake/Output Summary (Last 24 hours) at 2/22/2023 1549  Last data filed at 2/22/2023 1400  Gross per 24 hour   Intake 1238.44 ml   Output 750 ml   Net 488.44 ml       Physical Exam:  General Appearance: He was intubated and sedated  Neck: no adenopathy, no carotid bruit, no JVD, supple, symmetrical, trachea midline, and thyroid not enlarged, symmetric, no tenderness/mass/nodules  Lung: clear to auscultation bilaterally  Heart: regular rate and rhythm, S1, S2 normal, no murmur, click, rub or gallop  Abdomen: soft, non-tender; bowel sounds normal; no masses,  no organomegaly  Extremities:  extremities normal, atraumatic, no cyanosis or edema  Musculoskeletal: No joint swelling,    Neurologic: Mental status: Intubated and sedated    Lines     site day    Art line   None    TLC None    PICC None    Hemoaccess None    ABG:     Lab Results   Component Value Date/Time    PH 7.429 02/22/2023 04:30 AM    PCO2 33.9 02/22/2023 04:30 AM    PO2 85.3 02/22/2023 04:30 AM    HCO3 21.9 02/22/2023 04:30 AM    BE -1.7 02/22/2023 04:30 AM    THB 13.3 02/22/2023 04:30 AM    O2SAT 96.4 02/22/2023 04:30 AM        Medications     Labs   CBC with Differential:    Lab Results   Component Value Date/Time    WBC 6.0 02/22/2023 04:16 AM    RBC 3.80 02/22/2023 04:16 AM    HGB 12.3 02/22/2023 04:16 AM    HCT 35.3 02/22/2023 04:16 AM     02/22/2023 04:16 AM    MCV 92.9 02/22/2023 04:16 AM    MCH 32.4 02/22/2023 04:16 AM    MCHC 34.8 02/22/2023 04:16 AM    RDW 14.8 02/22/2023 04:16 AM    LYMPHOPCT 23.7 02/22/2023 04:16 AM    MONOPCT 10.4 02/22/2023 04:16 AM    BASOPCT 0.5 02/22/2023 04:16 AM    MONOSABS 0.63 02/22/2023 04:16 AM    LYMPHSABS 1.43 02/22/2023 04:16 AM    EOSABS 0.03 02/22/2023 04:16 AM    BASOSABS 0.03 02/22/2023 04:16 AM     CMP:    Lab Results   Component Value Date/Time     02/22/2023 04:16 AM    K 3.5 02/22/2023 04:16 AM     02/22/2023 04:16 AM    CO2 23 02/22/2023 04:16 AM    BUN 10 02/22/2023 04:16 AM    CREATININE 1.0 02/22/2023 04:16 AM    GFRAA >60 05/25/2018 07:32 AM    LABGLOM >60 02/22/2023 04:16 AM    GLUCOSE 92 02/22/2023 04:16 AM    PROT 5.3 02/22/2023 04:16 AM    LABALBU 3.1 02/22/2023 04:16 AM    CALCIUM 7.5 02/22/2023 04:16 AM    BILITOT 0.8 02/22/2023 04:16 AM    ALKPHOS 36 02/22/2023 04:16 AM     02/22/2023 04:16 AM     02/22/2023 04:16 AM           Resident's Assessment and Plan     Neurology:   Acute encephalopathy likely 2/2 drug overdose, resolved  -Patient has a multiple history of hospitalization with history of drug overdose  -CT head negative for intra cranial abnormalities, all labs are normal  -UDS positive for Fentanyl, Cocaine, Marijuana; Serum drug screen positive for Ethanol  -Patient extubated on 2/22/23 at 11: 30 AM  -On [facility] protocol      Cardiology:   History of atrial Fibrillation  -No documented history of treatment of A. Fibrillation.      Pulmonary:   Acute hypoxic respiratory failure likely 2/2 drug over dose, resolved  -Extubated, tolerating NC well  -patient is on 2 L NC  -Monitor respiratory status for 24 hours    Nephrology (Fluids/ Electrolytes & Nutrition):   HAGMA likely 2/2 lactic acidosis, resolved  -lactic acid on admission 8.7, resolved after fluid given    Gastroenterology:   Transaminitis likely 2/2 alcohol  -Monitor daily hepatic panel  -Follow up hepatitis C panel, and HIV panel.      PT/OT: PT/OT evaluation  DVT ppx: Lovenox  GI ppx: Protonix        Quin Khan MD, PGY-1    Attending physician: Dr. Neda Eduardo

## 2023-02-22 NOTE — PROGRESS NOTES
Hospitalist Progress Note            Patient: Suleiman Smith Age: 45 y.o.   DOA: 2/21/2023 Admit Dx / CC: Acute respiratory failure ([facility] [address] [J96.00]   LOS:  LOS: 1 day      Assessment/ Plan:     Acute metabolic encephalopathy& acute resp failure 2/2 inability to protect airway 2/2 unintentional drug overdose in pt with polysubstance abuse  UDS is positive for cocaine, fentanyl and cannabinoids. Patient adamantly denies use and says he does not know what happened prior to coming to the hospital  patient has history of multiple drug overdoses. Patient received Narcan in the field.   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[address]    Acute respiratory failure ([facility] [address]    Accidental drug overdose    Polysubstance abuse ([facility] [address]    Noncompliance    HTN (hypertension)    Cocaine abuse ([facility] [address]    Narcotic abuse ([facility] [address]    Marijuana abuse        Medications:  Reviewed    Infusion Medications    midazolam Stopped (02/22/23 0855)    sodium chloride       Scheduled Medications    folic acid  1 mg Oral Daily    [START ON 2/23/2023] pantoprazole  40 mg Oral QAM AC    sodium chloride flush  5-40 mL IntraVENous 2 times per day    enoxaparin  40 mg SubCUTAneous Daily    ipratropium-albuterol  1 ampule Inhalation Q4H WA    thiamine (VITAMIN B1) IVPB  500 mg IntraVENous Q8H    Followed by    Bernie Cruz ON 2/23/2023] thiamine (VITAMIN B1) IVPB  250 mg IntraVENous Q24H    Followed by    Bernie Cruz ON 2/28/2023] thiamine  100 mg Oral Daily     PRN Meds: LORazepam **OR** LORazepam **OR** LORazepam **OR** LORazepam **OR** LORazepam **OR** LORazepam **OR** LORazepam **OR** LORazepam, sodium chloride flush, sodium chloride, ondansetron **OR** ondansetron, polyethylene glycol, acetaminophen **OR** acetaminophen    I/O    Intake/Output Summary (Last 24 hours) at 2/22/2023 1514  Last data filed at 2/22/2023 1400  Gross per 24 hour   Intake 1238.44 ml   Output 750 ml   Net 488.44 ml       Labs:   Recent Labs     02/21/23  1240 02/21/23 2038 02/22/23  0416   WBC 8.4 9.9 6.0   HGB 16.9* 13.3 12.3*   HCT 49.5 38.4 35.3*    158 138       Recent Labs     02/21/23  1240 02/21/23 2038 02/22/23  0416    142 141   K 4.7 4.6 3.5    110* 110*   CO2 21* 27 23   BUN 8 9 10   CREATININE 1.0 1.1 1.0   CALCIUM 9.6 7.9* 7.5*   PHOS  --  3.7 2.1*       Recent Labs     02/21/23  1240 02/21/23 2038 02/22/23  0416   PROT 7.9 5.7* 5.3*   ALKPHOS 61 39* 36*   * 142* 131*   * 119* 104*   BILITOT 0.7 0.6 0.8   LIPASE  --   --  13       Recent Labs     02/21/23  1240   INR 0.9       Recent Labs     02/21/23  1240 02/22/23  1139   CKTOTAL 280* 211*       Chronic labs:  Lab Results   Component Value Date    TRIG 60 02/21/2023    INR 0.9 02/21/2023       Radiology:  Imaging studies reviewed today. Subjective:     Crystal Garcia is extubated and doing well, c/o sore throat    Objective:     Physical Exam:   BP (!) 145/87   Pulse 75   Temp 98.2 °F (36.8 °C) (Oral)   Resp 17   Wt 193 lb 9.6 oz (87.8 kg)   SpO2 100%   BMI 27.78 kg/m²     General appearance:in no distress   Lungs: Clear to auscultation bilaterally, no wheezing or crackles   Heart: Regular rate and rhythm, S1, S2 normal   Abdomen: Soft, non-tender and not-distended. Bowel sounds normal.   Extremities: no edema   Skin: Skin color, texture, turgor normal. No rashes or lesions   Neurologic: Grossly normal and non focal, CN II-XII intact.  Mundo Soriano MD   Hospitalist Service   02/22/23 3:14 PM

## 2023-02-22 NOTE — PROGRESS NOTES
OT consult received and appreciated. Chart reviewed. Will hold evaluation per nursing staff this date as pt is not appropriate for OT services. Will evaluate at a later time. Thank you.  Darin Pulliam, OTR/L # [id number]

## 2023-02-22 NOTE — FLOWSHEET NOTE
Patient attempting to pull at lines/tubes when unrestrained. 02/22/23 0400   Restraint Order   Length of Order (Only Document With Each New Order) Continuous Until 2359 of Next Calendar Day   Order Upon Application (Only Document With Each New Order) Yes   NV Length of Order 44   Restraint Type   Non-Violent Restraint Type from Order question Soft Restraint Bilateral Wrist   Soft Restraint Bilateral Wrist CONTINUED   Assessment   Less Restrictive Alternative Repositioning;1:1 patient care; Re-evaluate equipment;Disguise equipment; Re-orientation;Verbal re-direction;Diversional activities   Special Consideration/Risk Factors None   Justification from Order   Clinical Justification Pulling lines tubes dressing equipment   Education   Discontinuation Criteria Absence of behavior that required restraint   Criteria Explained Yes   Patient's Response No evidence of learning   Family Notification Already completed   Restraint  Monitoring Q2 Hours   Continued Justification  Continues to meet order criteria   Visual/Safety Check  Agitated/Restless   Circulation No signs of injury   Range of Motion Performed   Fluids NPO   Food/Meal NPO   Elimination No   Reason Patient did not Eliminate Urinary catheter   Care Plan Interventions   Remains free of injury from restraints (restraint for interference with medical device) Determine that other, less restrictive measures have been tried or would not be effective before applying the restraint; Inform patient/family regarding the reason for restraint; Evaluate the patient's condition at the time of restraint application; Every 2 hours: Monitor safety, psychosocial status, comfort, nutrition and hydration

## 2023-02-22 NOTE — FLOWSHEET NOTE
Patient attempting to pull at lines/tubes when unrestrained. 02/21/23 2000   Restraint Order   Length of Order (Only Document With Each New Order) Continuous Until 2359 of Next Calendar Day   Order Upon Application (Only Document With Each New Order) Yes   NV Length of Order 28   Restraint Type   Non-Violent Restraint Type from Order question Soft Restraint Bilateral Wrist   Soft Restraint Bilateral Wrist CONTINUED   Assessment   Less Restrictive Alternative 1:1 patient care;Repositioning;Disguise equipment; Re-evaluate equipment; Re-orientation;Verbal re-direction;Diversional activities   Special Consideration/Risk Factors None   Justification from Order   Clinical Justification Pulling lines tubes dressing equipment   Education   Discontinuation Criteria Absence of behavior that required restraint   Criteria Explained Yes   Patient's Response No evidence of learning   Family Notification Already completed   Restraint  Monitoring Q2 Hours   Continued Justification  Continues to meet order criteria   Visual/Safety Check  Sedated;Subdued   Circulation No signs of injury   Range of Motion Performed   Fluids NPO   Food/Meal NPO   Elimination No   Reason Patient did not Eliminate Urinary catheter   Care Plan Interventions   Remains free of injury from restraints (restraint for interference with medical device) Determine that other, less restrictive measures have been tried or would not be effective before applying the restraint; Evaluate the patient's condition at the time of restraint application; Inform patient/family regarding the reason for restraint; Every 2 hours: Monitor safety, psychosocial status, comfort, nutrition and hydration

## 2023-02-22 NOTE — PROCEDURES
Patient was extubated to 3 liters/min via nasal cannula. Breath Sounds post extubation were equal. Stridor was not present post extubation. SPO2 was 97%.       Performed by  Abdoulaye Ho RCP

## 2023-02-23 ENCOUNTER — APPOINTMENT (OUTPATIENT)
Dept: ULTRASOUND IMAGING | Age: 53
DRG: 917 | End: 2023-02-23
Payer: MEDICAID

## 2023-02-23 PROBLEM — F10.20 ALCOHOL DEPENDENCE (HCC): Status: ACTIVE | Noted: 2023-02-23

## 2023-02-23 PROBLEM — F10.10 ALCOHOL ABUSE: Status: ACTIVE | Noted: 2023-02-23

## 2023-02-23 PROBLEM — D50.9 IRON DEFICIENCY ANEMIA: Status: ACTIVE | Noted: 2023-02-23

## 2023-02-23 PROBLEM — F10.20 ALCOHOLISM (HCC): Status: ACTIVE | Noted: 2023-02-23

## 2023-02-23 LAB
ALBUMIN SERPL-MCNC: 3.4 G/DL (ref 3.5–5.2)
ALP BLD-CCNC: 46 U/L (ref 40–129)
ALT SERPL-CCNC: 117 U/L (ref 0–40)
ANION GAP SERPL CALCULATED.3IONS-SCNC: 7 MMOL/L (ref 7–16)
ANION GAP SERPL CALCULATED.3IONS-SCNC: 8 MMOL/L (ref 7–16)
ANION GAP SERPL CALCULATED.3IONS-SCNC: 9 MMOL/L (ref 7–16)
AST SERPL-CCNC: 76 U/L (ref 0–39)
BASOPHILS ABSOLUTE: 0.03 E9/L (ref 0–0.2)
BASOPHILS RELATIVE PERCENT: 0.4 % (ref 0–2)
BILIRUB SERPL-MCNC: 0.7 MG/DL (ref 0–1.2)
BUN BLDV-MCNC: 6 MG/DL (ref 6–20)
BUN BLDV-MCNC: 8 MG/DL (ref 6–20)
BUN BLDV-MCNC: 9 MG/DL (ref 6–20)
CALCIUM IONIZED: 1.18 MMOL/L (ref 1.15–1.33)
CALCIUM SERPL-MCNC: 7.9 MG/DL (ref 8.6–10.2)
CALCIUM SERPL-MCNC: 8.1 MG/DL (ref 8.6–10.2)
CALCIUM SERPL-MCNC: 8.4 MG/DL (ref 8.6–10.2)
CHLORIDE BLD-SCNC: 103 MMOL/L (ref 98–107)
CHLORIDE BLD-SCNC: 106 MMOL/L (ref 98–107)
CHLORIDE BLD-SCNC: 108 MMOL/L (ref 98–107)
CO2: 22 MMOL/L (ref 22–29)
CO2: 23 MMOL/L (ref 22–29)
CO2: 23 MMOL/L (ref 22–29)
CREAT SERPL-MCNC: 0.7 MG/DL (ref 0.7–1.2)
CREAT SERPL-MCNC: 0.8 MG/DL (ref 0.7–1.2)
CREAT SERPL-MCNC: 0.8 MG/DL (ref 0.7–1.2)
EKG ATRIAL RATE: 84 BPM
EKG P AXIS: 47 DEGREES
EKG P-R INTERVAL: 122 MS
EKG Q-T INTERVAL: 376 MS
EKG QRS DURATION: 72 MS
EKG QTC CALCULATION (BAZETT): 444 MS
EKG R AXIS: 13 DEGREES
EKG T AXIS: 37 DEGREES
EKG VENTRICULAR RATE: 84 BPM
EOSINOPHILS ABSOLUTE: 0.06 E9/L (ref 0.05–0.5)
EOSINOPHILS RELATIVE PERCENT: 0.9 % (ref 0–6)
FOLATE: 16.6 NG/ML (ref 4.8–24.2)
GFR SERPL CREATININE-BSD FRML MDRD: >60 ML/MIN/1.73
GLUCOSE BLD-MCNC: 124 MG/DL (ref 74–99)
GLUCOSE BLD-MCNC: 155 MG/DL (ref 74–99)
GLUCOSE BLD-MCNC: 164 MG/DL (ref 74–99)
HCT VFR BLD CALC: 36.8 % (ref 37–54)
HEMOGLOBIN: 13.1 G/DL (ref 12.5–16.5)
IMMATURE GRANULOCYTES #: 0.04 E9/L
IMMATURE GRANULOCYTES %: 0.6 % (ref 0–5)
IRON SATURATION: 15 % (ref 20–55)
IRON: 41 MCG/DL (ref 59–158)
LYMPHOCYTES ABSOLUTE: 1.2 E9/L (ref 1.5–4)
LYMPHOCYTES RELATIVE PERCENT: 17.6 % (ref 20–42)
MAGNESIUM: 1.7 MG/DL (ref 1.6–2.6)
MAGNESIUM: 2.5 MG/DL (ref 1.6–2.6)
MCH RBC QN AUTO: 31.9 PG (ref 26–35)
MCHC RBC AUTO-ENTMCNC: 35.6 % (ref 32–34.5)
MCV RBC AUTO: 89.5 FL (ref 80–99.9)
MONOCYTES ABSOLUTE: 0.7 E9/L (ref 0.1–0.95)
MONOCYTES RELATIVE PERCENT: 10.3 % (ref 2–12)
NEUTROPHILS ABSOLUTE: 4.78 E9/L (ref 1.8–7.3)
NEUTROPHILS RELATIVE PERCENT: 70.2 % (ref 43–80)
PDW BLD-RTO: 13.9 FL (ref 11.5–15)
PHOSPHORUS: 2.7 MG/DL (ref 2.5–4.5)
PHOSPHORUS: 3.3 MG/DL (ref 2.5–4.5)
PLATELET # BLD: 144 E9/L (ref 130–450)
PMV BLD AUTO: 9.8 FL (ref 7–12)
POTASSIUM SERPL-SCNC: 3.7 MMOL/L (ref 3.5–5)
POTASSIUM SERPL-SCNC: 3.8 MMOL/L (ref 3.5–5)
POTASSIUM SERPL-SCNC: 4 MMOL/L (ref 3.5–5)
RBC # BLD: 4.11 E12/L (ref 3.8–5.8)
SODIUM BLD-SCNC: 133 MMOL/L (ref 132–146)
SODIUM BLD-SCNC: 137 MMOL/L (ref 132–146)
SODIUM BLD-SCNC: 139 MMOL/L (ref 132–146)
TOTAL IRON BINDING CAPACITY: 267 MCG/DL (ref 250–450)
TOTAL PROTEIN: 6 G/DL (ref 6.4–8.3)
TRIGL SERPL-MCNC: 36 MG/DL (ref 0–149)
TROPONIN, HIGH SENSITIVITY: 15 NG/L (ref 0–11)
TSH SERPL DL<=0.05 MIU/L-ACNC: 1.6 UIU/ML (ref 0.27–4.2)
VITAMIN B-12: 460 PG/ML (ref 211–946)
VITAMIN D 25-HYDROXY: 21 NG/ML (ref 30–100)
WBC # BLD: 6.8 E9/L (ref 4.5–11.5)

## 2023-02-23 PROCEDURE — 6360000002 HC RX W HCPCS

## 2023-02-23 PROCEDURE — 80048 BASIC METABOLIC PNL TOTAL CA: CPT

## 2023-02-23 PROCEDURE — 6370000000 HC RX 637 (ALT 250 FOR IP)

## 2023-02-23 PROCEDURE — 2580000003 HC RX 258: Performed by: INTERNAL MEDICINE

## 2023-02-23 PROCEDURE — 93971 EXTREMITY STUDY: CPT | Performed by: RADIOLOGY

## 2023-02-23 PROCEDURE — 83540 ASSAY OF IRON: CPT

## 2023-02-23 PROCEDURE — 84478 ASSAY OF TRIGLYCERIDES: CPT

## 2023-02-23 PROCEDURE — 83735 ASSAY OF MAGNESIUM: CPT

## 2023-02-23 PROCEDURE — 80053 COMPREHEN METABOLIC PANEL: CPT

## 2023-02-23 PROCEDURE — 93010 ELECTROCARDIOGRAM REPORT: CPT | Performed by: INTERNAL MEDICINE

## 2023-02-23 PROCEDURE — 82607 VITAMIN B-12: CPT

## 2023-02-23 PROCEDURE — 84100 ASSAY OF PHOSPHORUS: CPT

## 2023-02-23 PROCEDURE — 6360000002 HC RX W HCPCS: Performed by: INTERNAL MEDICINE

## 2023-02-23 PROCEDURE — 2500000003 HC RX 250 WO HCPCS

## 2023-02-23 PROCEDURE — 82306 VITAMIN D 25 HYDROXY: CPT

## 2023-02-23 PROCEDURE — 85025 COMPLETE CBC W/AUTO DIFF WBC: CPT

## 2023-02-23 PROCEDURE — 2580000003 HC RX 258: Performed by: FAMILY MEDICINE

## 2023-02-23 PROCEDURE — 36415 COLL VENOUS BLD VENIPUNCTURE: CPT

## 2023-02-23 PROCEDURE — 2580000003 HC RX 258

## 2023-02-23 PROCEDURE — 82330 ASSAY OF CALCIUM: CPT

## 2023-02-23 PROCEDURE — 6370000000 HC RX 637 (ALT 250 FOR IP): Performed by: INTERNAL MEDICINE

## 2023-02-23 PROCEDURE — 93971 EXTREMITY STUDY: CPT

## 2023-02-23 PROCEDURE — 6370000000 HC RX 637 (ALT 250 FOR IP): Performed by: FAMILY MEDICINE

## 2023-02-23 PROCEDURE — 82746 ASSAY OF FOLIC ACID SERUM: CPT

## 2023-02-23 PROCEDURE — 93005 ELECTROCARDIOGRAM TRACING: CPT | Performed by: INTERNAL MEDICINE

## 2023-02-23 PROCEDURE — 2000000000 HC ICU R&B

## 2023-02-23 PROCEDURE — 84484 ASSAY OF TROPONIN QUANT: CPT

## 2023-02-23 PROCEDURE — 94640 AIRWAY INHALATION TREATMENT: CPT

## 2023-02-23 PROCEDURE — 83550 IRON BINDING TEST: CPT

## 2023-02-23 PROCEDURE — 6360000002 HC RX W HCPCS: Performed by: FAMILY MEDICINE

## 2023-02-23 PROCEDURE — 99233 SBSQ HOSP IP/OBS HIGH 50: CPT | Performed by: INTERNAL MEDICINE

## 2023-02-23 PROCEDURE — 84443 ASSAY THYROID STIM HORMONE: CPT

## 2023-02-23 RX ORDER — PHENOBARBITAL SODIUM 65 MG/ML
32.5 INJECTION INTRAMUSCULAR 4 TIMES DAILY
Status: CANCELLED | OUTPATIENT
Start: 2023-02-24 | End: 2023-02-25

## 2023-02-23 RX ORDER — ERGOCALCIFEROL 1.25 MG/1
50000 CAPSULE ORAL WEEKLY
Status: DISCONTINUED | OUTPATIENT
Start: 2023-02-23 | End: 2023-03-01 | Stop reason: HOSPADM

## 2023-02-23 RX ORDER — MAGNESIUM SULFATE IN WATER 40 MG/ML
2000 INJECTION, SOLUTION INTRAVENOUS ONCE
Status: COMPLETED | OUTPATIENT
Start: 2023-02-23 | End: 2023-02-23

## 2023-02-23 RX ORDER — PHENOBARBITAL SODIUM 65 MG/ML
32.5 INJECTION INTRAMUSCULAR 2 TIMES DAILY
Status: COMPLETED | OUTPATIENT
Start: 2023-02-24 | End: 2023-02-25

## 2023-02-23 RX ORDER — PHENOBARBITAL SODIUM 65 MG/ML
16.2 INJECTION INTRAMUSCULAR EVERY 6 HOURS PRN
Status: DISCONTINUED | OUTPATIENT
Start: 2023-02-25 | End: 2023-02-23

## 2023-02-23 RX ORDER — PHENOBARBITAL SODIUM 65 MG/ML
65 INJECTION INTRAMUSCULAR EVERY 6 HOURS PRN
Status: CANCELLED | OUTPATIENT
Start: 2023-02-23 | End: 2023-02-24

## 2023-02-23 RX ORDER — PHENOBARBITAL SODIUM 65 MG/ML
16.2 INJECTION INTRAMUSCULAR 2 TIMES DAILY
Status: DISCONTINUED | OUTPATIENT
Start: 2023-02-25 | End: 2023-02-25

## 2023-02-23 RX ORDER — PHENOBARBITAL SODIUM 65 MG/ML
32.5 INJECTION INTRAMUSCULAR EVERY 6 HOURS PRN
Status: DISCONTINUED | OUTPATIENT
Start: 2023-02-23 | End: 2023-02-23

## 2023-02-23 RX ORDER — PHENOBARBITAL SODIUM 65 MG/ML
32.5 INJECTION INTRAMUSCULAR EVERY 6 HOURS PRN
Status: CANCELLED | OUTPATIENT
Start: 2023-02-24 | End: 2023-02-26

## 2023-02-23 RX ORDER — POTASSIUM CHLORIDE 20 MEQ/1
20 TABLET, EXTENDED RELEASE ORAL ONCE
Status: COMPLETED | OUTPATIENT
Start: 2023-02-23 | End: 2023-02-23

## 2023-02-23 RX ORDER — LORAZEPAM 2 MG/ML
1 INJECTION INTRAMUSCULAR
Status: DISCONTINUED | OUTPATIENT
Start: 2023-02-23 | End: 2023-03-01 | Stop reason: HOSPADM

## 2023-02-23 RX ORDER — LORAZEPAM 2 MG/ML
2 INJECTION INTRAMUSCULAR
Status: DISCONTINUED | OUTPATIENT
Start: 2023-02-23 | End: 2023-03-01 | Stop reason: HOSPADM

## 2023-02-23 RX ORDER — CHOLECALCIFEROL (VITAMIN D3) 50 MCG
2000 TABLET ORAL DAILY
Status: DISCONTINUED | OUTPATIENT
Start: 2023-02-23 | End: 2023-03-01 | Stop reason: HOSPADM

## 2023-02-23 RX ORDER — PHENOBARBITAL SODIUM 65 MG/ML
65 INJECTION INTRAMUSCULAR 4 TIMES DAILY
Status: CANCELLED | OUTPATIENT
Start: 2023-02-23 | End: 2023-02-24

## 2023-02-23 RX ORDER — PHENOBARBITAL SODIUM 65 MG/ML
32.5 INJECTION INTRAMUSCULAR 4 TIMES DAILY
Status: COMPLETED | OUTPATIENT
Start: 2023-02-23 | End: 2023-02-24

## 2023-02-23 RX ORDER — LANOLIN ALCOHOL/MO/W.PET/CERES
1000 CREAM (GRAM) TOPICAL DAILY
Status: DISCONTINUED | OUTPATIENT
Start: 2023-02-23 | End: 2023-03-01 | Stop reason: HOSPADM

## 2023-02-23 RX ORDER — PHENOBARBITAL SODIUM 65 MG/ML
32.5 INJECTION INTRAMUSCULAR 2 TIMES DAILY
Status: CANCELLED | OUTPATIENT
Start: 2023-02-25 | End: 2023-02-26

## 2023-02-23 RX ORDER — PHENOBARBITAL SODIUM 65 MG/ML
16.2 INJECTION INTRAMUSCULAR 2 TIMES DAILY
Status: CANCELLED | OUTPATIENT
Start: 2023-02-26 | End: 2023-02-27

## 2023-02-23 RX ORDER — IPRATROPIUM BROMIDE AND ALBUTEROL SULFATE 2.5; .5 MG/3ML; MG/3ML
1 SOLUTION RESPIRATORY (INHALATION) EVERY 4 HOURS PRN
Status: DISCONTINUED | OUTPATIENT
Start: 2023-02-23 | End: 2023-03-01 | Stop reason: HOSPADM

## 2023-02-23 RX ORDER — LORAZEPAM 2 MG/ML
3 INJECTION INTRAMUSCULAR
Status: DISCONTINUED | OUTPATIENT
Start: 2023-02-23 | End: 2023-03-01 | Stop reason: HOSPADM

## 2023-02-23 RX ORDER — PHENOBARBITAL SODIUM 65 MG/ML
16.2 INJECTION INTRAMUSCULAR EVERY 6 HOURS PRN
Status: CANCELLED | OUTPATIENT
Start: 2023-02-26 | End: 2023-02-27

## 2023-02-23 RX ORDER — LORAZEPAM 2 MG/ML
4 INJECTION INTRAMUSCULAR
Status: DISCONTINUED | OUTPATIENT
Start: 2023-02-23 | End: 2023-03-01 | Stop reason: HOSPADM

## 2023-02-23 RX ADMIN — MAGNESIUM SULFATE HEPTAHYDRATE 2000 MG: 40 INJECTION, SOLUTION INTRAVENOUS at 02:40

## 2023-02-23 RX ADMIN — PHENOBARBITAL SODIUM 32.5 MG: 65 INJECTION INTRAMUSCULAR at 21:39

## 2023-02-23 RX ADMIN — LORAZEPAM 1 MG: 2 INJECTION INTRAMUSCULAR; INTRAVENOUS at 15:54

## 2023-02-23 RX ADMIN — LORAZEPAM 3 MG: 1 TABLET ORAL at 01:38

## 2023-02-23 RX ADMIN — ENOXAPARIN SODIUM 40 MG: 100 INJECTION SUBCUTANEOUS at 08:45

## 2023-02-23 RX ADMIN — LORAZEPAM 1 MG: 2 INJECTION INTRAMUSCULAR; INTRAVENOUS at 12:40

## 2023-02-23 RX ADMIN — LORAZEPAM 1 MG: 2 INJECTION INTRAMUSCULAR; INTRAVENOUS at 14:39

## 2023-02-23 RX ADMIN — THIAMINE HYDROCHLORIDE 500 MG: 200 INJECTION, SOLUTION INTRAMUSCULAR; INTRAVENOUS at 11:44

## 2023-02-23 RX ADMIN — LORAZEPAM 1 MG: 2 INJECTION INTRAMUSCULAR; INTRAVENOUS at 08:44

## 2023-02-23 RX ADMIN — LORAZEPAM 1 MG: 2 INJECTION INTRAMUSCULAR; INTRAVENOUS at 16:47

## 2023-02-23 RX ADMIN — SODIUM CHLORIDE, PRESERVATIVE FREE 10 ML: 5 INJECTION INTRAVENOUS at 08:46

## 2023-02-23 RX ADMIN — POTASSIUM CHLORIDE 20 MEQ: 1500 TABLET, EXTENDED RELEASE ORAL at 05:00

## 2023-02-23 RX ADMIN — LORAZEPAM 4 MG: 1 TABLET ORAL at 06:19

## 2023-02-23 RX ADMIN — LORAZEPAM 4 MG: 1 TABLET ORAL at 04:20

## 2023-02-23 RX ADMIN — THIAMINE HYDROCHLORIDE 250 MG: 100 INJECTION, SOLUTION INTRAMUSCULAR; INTRAVENOUS at 21:15

## 2023-02-23 RX ADMIN — LORAZEPAM 2 MG: 2 INJECTION INTRAMUSCULAR; INTRAVENOUS at 17:43

## 2023-02-23 RX ADMIN — IPRATROPIUM BROMIDE AND ALBUTEROL SULFATE 1 AMPULE: 2.5; .5 SOLUTION RESPIRATORY (INHALATION) at 09:10

## 2023-02-23 RX ADMIN — SODIUM PHOSPHATE, MONOBASIC, MONOHYDRATE AND SODIUM PHOSPHATE, DIBASIC, ANHYDROUS 15 MMOL: 276; 142 INJECTION, SOLUTION INTRAVENOUS at 05:53

## 2023-02-23 RX ADMIN — PHENOBARBITAL SODIUM 32.5 MG: 65 INJECTION INTRAMUSCULAR at 11:04

## 2023-02-23 RX ADMIN — SODIUM CHLORIDE 125 MG: 9 INJECTION, SOLUTION INTRAVENOUS at 10:09

## 2023-02-23 RX ADMIN — LORAZEPAM 4 MG: 2 SOLUTION, CONCENTRATE ORAL at 07:28

## 2023-02-23 RX ADMIN — LORAZEPAM 4 MG: 1 TABLET ORAL at 03:10

## 2023-02-23 RX ADMIN — SODIUM CHLORIDE: 9 INJECTION, SOLUTION INTRAVENOUS at 02:38

## 2023-02-23 RX ADMIN — PANTOPRAZOLE SODIUM 40 MG: 40 TABLET, DELAYED RELEASE ORAL at 06:20

## 2023-02-23 RX ADMIN — THIAMINE HYDROCHLORIDE 500 MG: 200 INJECTION, SOLUTION INTRAMUSCULAR; INTRAVENOUS at 05:06

## 2023-02-23 RX ADMIN — PHENOBARBITAL SODIUM 32.5 MG: 65 INJECTION INTRAMUSCULAR at 14:41

## 2023-02-23 RX ADMIN — LORAZEPAM 1 MG: 2 INJECTION INTRAMUSCULAR; INTRAVENOUS at 09:47

## 2023-02-23 RX ADMIN — SODIUM CHLORIDE, PRESERVATIVE FREE 10 ML: 5 INJECTION INTRAVENOUS at 21:43

## 2023-02-23 ASSESSMENT — PAIN SCALES - GENERAL
PAINLEVEL_OUTOF10: 3
PAINLEVEL_OUTOF10: 0
PAINLEVEL_OUTOF10: 0
PAINLEVEL_OUTOF10: 3
PAINLEVEL_OUTOF10: 0

## 2023-02-23 NOTE — PLAN OF CARE
Problem: Pain  Goal: Verbalizes/displays adequate comfort level or baseline comfort level  Outcome: Progressing     Problem: Safety - Adult  Goal: Free from fall injury  Outcome: Progressing     Problem: Neurosensory - Adult  Goal: Achieves stable or improved neurological status  Outcome: Progressing     Problem: Respiratory - Adult  Goal: Achieves optimal ventilation and oxygenation  Outcome: Progressing     Problem: Cardiovascular - Adult  Goal: Maintains optimal cardiac output and hemodynamic stability  Outcome: Progressing     Problem: Musculoskeletal - Adult  Goal: Return mobility to safest level of function  Outcome: Progressing     Problem: Gastrointestinal - Adult  Goal: Minimal or absence of nausea and vomiting  Outcome: Progressing

## 2023-02-23 NOTE — PROGRESS NOTES
[address] [facility]  Department of Internal Medicine   Internal Medicine Residency   MICU Progress Note    Patient:  Arjun Jolley 45 y.o. male  MRN: [id number]     Date of Service: 2/23/2023    Allergy: Red dye    Subjective     The patient was seen by the bedside in the AM.  The patient was very somnolent not answering questions properly. The patient mentioned that he had pain in his right leg under the knee. The compartments were soft. 24h change:   Patient will multiple doses of Ativan overnight due to CIWA scale more than 8. The patient also received phenobarbital.  In the am, the patient CIWA score was  ROS: Denies Fever/chills/CP/SOB/N/V/D/C/Dysuria/Blood in stool or urine  Objective     Physical Exam:  Vitals: BP (!) 146/84   Pulse 68   Temp 97.7 °F (36.5 °C) (Temporal)   Resp 23   Wt 186 lb 8.9 oz (84.6 kg)   SpO2 98%   BMI 26.77 kg/m²       Constitutional: Somnolent, has to be called multiple times to talk. Head: Normocephalic and atraumatic. Eyes: Conjunctiva normal, (-) scleral icterus. Mucus membranes moist.  Mouth: Mucus membranes moist. Oropharynx clear. No deviation of the tongue or uvula. Neck: (-)  swelling,   Respiratory: Lungs clear to auscultation bilaterally. (-)  wheezes, (-)  rales, (-)  rhonchi. Cardiovascular: RRR. (-)  murmurs, (-) gallops,  (-) rubs. S1 and S2 were normal.   GI:  Abdomen soft, non-tender, non-distended. (+) BS. (-) guarding, (-) rigidity. Extremities: Warm and well perfused. (-) clubbing, (-) cyanosis. (-) peripheral edema. Neurologic:  Strength equal B/L.     I & O - 24hr:   Intake/Output Summary (Last 24 hours) at 2/23/2023 1627  Last data filed at 2/23/2023 1515  Gross per 24 hour   Intake 1608.59 ml   Output 5000 ml   Net -3391.41 ml       Lines    None    Oxygen:    On Room Air  ABG:     Lab Results   Component Value Date/Time    PH 7.429 02/22/2023 04:30 AM    PCO2 33.9 02/22/2023 04:30 AM    PO2 85.3 02/22/2023 04:30 AM    HCO3 21.9 02/22/2023 04:30 AM    BE -1.7 02/22/2023 04:30 AM    THB 13.3 02/22/2023 04:30 AM    O2SAT 96.4 02/22/2023 04:30 AM        Medications     Infusions: (Fluid, Sedation, Vasopressors)  None    Nutrition:   Diet  ATB:   Antibiotics  Days   None              Skin issues: None     Patient currently has   Urinary cath  Isolation  DVT prophylaxis/ GI prophylaxis,    PT/OT    Labs   CBC with Differential:    Lab Results   Component Value Date/Time    WBC 6.8 02/23/2023 04:14 AM    RBC 4.11 02/23/2023 04:14 AM    HGB 13.1 02/23/2023 04:14 AM    HCT 36.8 02/23/2023 04:14 AM     02/23/2023 04:14 AM    MCV 89.5 02/23/2023 04:14 AM    MCH 31.9 02/23/2023 04:14 AM    MCHC 35.6 02/23/2023 04:14 AM    RDW 13.9 02/23/2023 04:14 AM    LYMPHOPCT 17.6 02/23/2023 04:14 AM    MONOPCT 10.3 02/23/2023 04:14 AM    BASOPCT 0.4 02/23/2023 04:14 AM    MONOSABS 0.70 02/23/2023 04:14 AM    LYMPHSABS 1.20 02/23/2023 04:14 AM    EOSABS 0.06 02/23/2023 04:14 AM    BASOSABS 0.03 02/23/2023 04:14 AM     CMP:    Lab Results   Component Value Date/Time     02/23/2023 10:00 AM    K 4.0 02/23/2023 10:00 AM     02/23/2023 10:00 AM    CO2 23 02/23/2023 10:00 AM    BUN 6 02/23/2023 10:00 AM    CREATININE 0.7 02/23/2023 10:00 AM    GFRAA >60 05/25/2018 07:32 AM    LABGLOM >60 02/23/2023 10:00 AM    GLUCOSE 124 02/23/2023 10:00 AM    PROT 6.0 02/23/2023 04:14 AM    LABALBU 3.4 02/23/2023 04:14 AM    CALCIUM 8.4 02/23/2023 10:00 AM    BILITOT 0.7 02/23/2023 04:14 AM    ALKPHOS 46 02/23/2023 04:14 AM    AST 76 02/23/2023 04:14 AM     02/23/2023 04:14 AM     BMP:    Lab Results   Component Value Date/Time     02/23/2023 10:00 AM    K 4.0 02/23/2023 10:00 AM     02/23/2023 10:00 AM    CO2 23 02/23/2023 10:00 AM    BUN 6 02/23/2023 10:00 AM    LABALBU 3.4 02/23/2023 04:14 AM    CREATININE 0.7 02/23/2023 10:00 AM    CALCIUM 8.4 02/23/2023 10:00 AM    GFRAA >60 05/25/2018 07:32 AM    LABGLOM >60 02/23/2023 10:00 AM    GLUCOSE 124 02/23/2023 10:00 AM       Imaging Studies:  XR CHEST PORTABLE   Performed: Impression: Advancement of endotracheal tube to just above the thoracic inlet. No new abnormal findings. Resident's Assessment and Plan     Acute metabolic encephalopathy 2/2 most likely drug overdose  The patient's UDS is positive for cocaine, fentanyl and cannabinoids. The patient has history of multiple drug overdoses. Patient has been very agitated with CIWA close to 10 every hour. Continue CIWA protocol  Changed oral ativan to IV injections. Cardiology:   History of atrial fibrillation  There is a documentation of remote treatment with atrial fibrillation. No recent treatment. Monitor patient's heart rate and rhythm. Pulmonary:   Acute hypoxic respiratory failure 2/2 drug overdose  Patient extubated on 2/22/23. On room air. Monitor Resp status     Nephrology (Fluids/ Electrolytes & Nutrition):   High anion gap metabolic acidosis 2/2 lactic acidosis-resolved  BMP daily. Gastroenterology:   Transaminitis  Hepatitis panel Negative. Monitor LFTs daily. Code Status:   Full code     PT/OT: not indicated at this time. DVT ppx: Lovenox  GI ppx: PPI  Disposition: Continue current care    Nir Dupont MD, PGY-1    Attending physician: Dr. Lynn Toledo    Attending Attestation Note:     I have personally seen and examined the patient in the ICU on 02/23/23. I discussed the case in detail with the resident, nursing and respiratory therapist as needed. I reviewed the pertinent laboratory studies, imaging studies, and records. Key elements of the encounter were performed by me (> 85 % time). Family is updated at the bedside as available. Key issues of the case were discussed among consultants. Past medical history, surgical history, family history, and social history are unchanged unless stated in the history of present illness. I have reviewed the patient's medications and allergies.       Please see separate resident note. Acute metabolic/toxic encephalopathy  Respiratory failure/intubated for airway protection.   Alcohol/opiate withdrawal     Extubated 2/22  CIWA and phenobarb for alcohol withdrawal    Lee Yao MD MS  Pulmonary & [facility]

## 2023-02-23 NOTE — PLAN OF CARE
Problem: Pain  Goal: Verbalizes/displays adequate comfort level or baseline comfort level  2/23/2023 1415 by Bettylou Boas, RN  Outcome: Progressing  2/23/2023 0211 by Mary Dover RN  Outcome: Progressing     Problem: Safety - Adult  Goal: Free from fall injury  2/23/2023 1415 by Bettylou Boas, RN  Outcome: Progressing  Flowsheets (Taken 2/23/2023 1414)  Free From Fall Injury: Instruct family/caregiver on patient safety  2/23/2023 0211 by Mary Dover RN  Outcome: Progressing     Problem: Neurosensory - Adult  Goal: Achieves stable or improved neurological status  2/23/2023 1415 by Bettylou Boas, RN  Outcome: Progressing  2/23/2023 0211 by Mary Dover RN  Outcome: Progressing  Goal: Achieves maximal functionality and self care  Outcome: Progressing     Problem: Respiratory - Adult  Goal: Achieves optimal ventilation and oxygenation  2/23/2023 1415 by Bettylou Boas, RN  Outcome: Progressing  2/23/2023 0211 by Mary Dover RN  Outcome: Progressing     Problem: Cardiovascular - Adult  Goal: Maintains optimal cardiac output and hemodynamic stability  2/23/2023 1415 by Bettylou Boas, RN  Outcome: Progressing  2/23/2023 0211 by Mary Dover RN  Outcome: Progressing  Goal: Absence of cardiac dysrhythmias or at baseline  Outcome: Progressing     Problem: Musculoskeletal - Adult  Goal: Return mobility to safest level of function  2/23/2023 1415 by Bettylou Boas, RN  Outcome: Progressing  Flowsheets (Taken 2/23/2023 0800)  Return Mobility to Safest Level of Function:   Assess patient stability and activity tolerance for standing, transferring and ambulating with or without assistive devices   Assist with transfers and ambulation using safe patient handling equipment as needed   Obtain physical therapy/occupational therapy consults as needed  2/23/2023 0211 by Mary Dover RN  Outcome: Progressing  Goal: Maintain proper alignment of affected body part  Outcome: Progressing  Flowsheets (Taken 2/23/2023 0800)  Maintain proper alignment of affected body part:   Support and protect limb and body alignment per provider's orders   Instruct and reinforce with patient and family use of appropriate assistive device and precautions (e.g. spinal or hip dislocation precautions)  Goal: Return ADL status to a safe level of function  Outcome: Progressing     Problem: Gastrointestinal - Adult  Goal: Minimal or absence of nausea and vomiting  2/23/2023 1415 by Monique Goodell, RN  Outcome: Progressing  2/23/2023 0211 by Yamile Preston RN  Outcome: Progressing  Goal: Maintains or returns to baseline bowel function  Outcome: Progressing  Goal: Maintains adequate nutritional intake  Outcome: Progressing     Problem: Genitourinary - Adult  Goal: Absence of urinary retention  Outcome: Progressing  Goal: Urinary catheter remains patent  Outcome: Progressing     Problem: ABCDS Injury Assessment  Goal: Absence of physical injury  Outcome: Progressing  Flowsheets (Taken 2/23/2023 1414)  Absence of Physical Injury: Implement safety measures based on patient assessment     Problem: Skin/Tissue Integrity  Goal: Absence of new skin breakdown  Description: 1. Monitor for areas of redness and/or skin breakdown  2. Assess vascular access sites hourly  3. Every 4-6 hours minimum:  Change oxygen saturation probe site  4. Every 4-6 hours:  If on nasal continuous positive airway pressure, respiratory therapy assess nares and determine need for appliance change or resting period.   Outcome: Progressing

## 2023-02-23 NOTE — PROGRESS NOTES
Patient attempted to stand at the bed side to urinate and lowered himself to the floor. Patient refusing to stand. Patient assisted back to the side of the bed where he was able to urinated and then he was assisted back into bed. CIWA score was obtained and medication given as ordered.

## 2023-02-23 NOTE — PROGRESS NOTES
Hospitalist Progress Note            Patient: Kwesi Ramirez Age: 45 y.o.   DOA: 2/21/2023 Admit Dx / CC: Acute respiratory failure ([address] [J96.00]   LOS:  LOS: 2 days      Assessment/ Plan:     45 y.o. male with past medical history of drug abuse atrial fibrillation hypertension . Patient was admitted due to unresponsive . Patient was found lying down behind the soup kitchen . EMS was called and the patient was found obtunded . Patient received 10 mg Narcan  and he woke up and started screaming  . Patient was unable to protect airway and was intubated. UDS +ve THC, cocaine, fentanyl,  tylenol and salicylate. Extubated 2/22 but went into alcohol withdrawal and started on phenobarb protocol    Acute metabolic encephalopathy& acute resp failure 2/2 inability to protect airway 2/2 unintentional drug overdose in pt with polysubstance abuse  UDS is positive for cocaine, fentanyl and cannabinoids. Patient adamantly denies use and says he does not know what happened prior to coming to the hospital  patient has history of multiple drug overdoses. Patient received Narcan in the field.   S/p extubation 2/22  Mx per PCCM while in ICU  SW consult    Alcohol abuse/dependence with withdrawal  CIWA and phenobarb   Now has bedside sitter  Mx per ICU team while in ICU     Hx/o P-Atrial fibrillation  monitor     High anion gap metabolic acidosis 2/2 lactic acidosis- resolved     Transaminitis likely 2/2 EtOH, improving  hepatitis C panel neg  HIV scrn neg    Physical deconditioning  PT/OT eval    Vitamin D deficiency  Start replacement    Iron deficiency anemia  Load with IV iron    DVT ppx:  Lovenox  Code status: Full code    Plan discharge when stable    Plan of care discussed with: patient and bedside RN    Patient Active Problem List   Diagnosis    Atrial fibrillation ([address]    SVT (supraventricular tachycardia) ([address]    Viral cardiomyopathy ([address]    Acute respiratory failure ([address]    Accidental drug overdose Polysubstance abuse ([facility]    Noncompliance    HTN (hypertension)    Cocaine abuse ([facility]    Narcotic abuse ([facility]    Marijuana abuse    [place] coma scale total score 3-8 (HCC)    Alcoholism ([facility]    Alcohol abuse    Alcohol dependence (HCC)    Iron deficiency anemia        Medications:  Reviewed    Infusion Medications    sodium chloride Stopped (02/23/23 0553)     Scheduled Medications    vitamin D  50,000 Units Oral Weekly    vitamin D  2,000 Units Oral Daily    vitamin B-12  1,000 mcg Oral Daily    ferric gluconate (FERRLECIT) IVPB  125 mg IntraVENous Daily    PHENobarbital  32.5 mg IntraVENous 4x daily    Followed by    Robin Mendoza ON 2/24/2023] PHENobarbital  32.5 mg IntraVENous BID    Followed by    Robin Mendoza ON 2/25/2023] PHENobarbital  16.2 mg IntraVENous BID    folic acid  1 mg Oral Daily    pantoprazole  40 mg Oral QAM AC    sodium chloride flush  5-40 mL IntraVENous 2 times per day    enoxaparin  40 mg SubCUTAneous Daily    thiamine (VITAMIN B1) IVPB  250 mg IntraVENous Q24H    Followed by    Robin Mendoza ON 2/28/2023] thiamine  100 mg Oral Daily     PRN Meds: LORazepam, ipratropium-albuterol, LORazepam **OR** LORazepam **OR** LORazepam **OR** LORazepam **OR** LORazepam **OR** LORazepam **OR** LORazepam **OR** LORazepam, Menthol, sodium chloride flush, sodium chloride, ondansetron **OR** ondansetron, polyethylene glycol, acetaminophen **OR** acetaminophen    I/O    Intake/Output Summary (Last 24 hours) at 2/23/2023 1548  Last data filed at 2/23/2023 1515  Gross per 24 hour   Intake 1608.59 ml   Output 5250 ml   Net -3641.41 ml       Labs:   Recent Labs     02/21/23 2038 02/22/23 0416 02/23/23 0414   WBC 9.9 6.0 6.8   HGB 13.3 12.3* 13.1   HCT 38.4 35.3* 36.8*    138 144       Recent Labs     02/22/23  1759 02/23/23  0024 02/23/23  0414 02/23/23  1000    137 133 139   K 4.4 3.7 3.8 4.0    106 103 108*   CO2 24 22 23 23   BUN 11 9 8 6   CREATININE 0.9 0.8 0.8 0.7   CALCIUM 7.9* 7.9* 8.1* 8.4*   PHOS 2. 1* 3.3 2.7  --        Recent Labs     02/22/23  0416 02/22/23  1759 02/23/23  0414   PROT 5.3* 6.0* 6.0*   ALKPHOS 36* 43 46   * 137* 117*   * 95* 76*   BILITOT 0.8 0.6 0.7   LIPASE 13  --   --        Recent Labs     02/21/23  1240   INR 0.9       Recent Labs     02/21/23  1240 02/22/23  1139   CKTOTAL 280* 211*       Chronic labs:  Lab Results   Component Value Date    TRIG 60 02/21/2023    TSH 1.600 02/23/2023    INR 0.9 02/21/2023       Radiology:  Imaging studies reviewed today. Subjective:     Cinda Shipman is sleepy and calm today    Objective:     Physical Exam:   BP (!) 159/101   Pulse 66   Temp 97.5 °F (36.4 °C) (Temporal)   Resp 22   Wt 186 lb 8.9 oz (84.6 kg)   SpO2 98%   BMI 26.77 kg/m²     General appearance:in no distress, flat on his side in bed  Lungs: Clear to auscultation bilaterally, no wheezing or crackles   Heart: Regular rate and rhythm, S1, S2 normal   Abdomen: Soft, non-tender and not-distended.  Bowel sounds normal.   Extremities: no edema   Neurologic: sleepy, calm      Wendy Cheung MD   Hospitalist Service   02/23/23 3:48 PM

## 2023-02-23 NOTE — PROGRESS NOTES
Patient has BMP due at 0800, he still has sodium phosphate running until [phone]. Residents made aware and agree to hold off draw until sodium phosphate has been completed.

## 2023-02-24 PROBLEM — F10.931 DTS (DELIRIUM TREMENS) (HCC): Status: ACTIVE | Noted: 2023-02-24

## 2023-02-24 LAB
ALBUMIN SERPL-MCNC: 3.6 G/DL (ref 3.5–5.2)
ALP BLD-CCNC: 46 U/L (ref 40–129)
ALT SERPL-CCNC: 109 U/L (ref 0–40)
ANION GAP SERPL CALCULATED.3IONS-SCNC: 8 MMOL/L (ref 7–16)
AST SERPL-CCNC: 65 U/L (ref 0–39)
BASOPHILS ABSOLUTE: 0.03 E9/L (ref 0–0.2)
BASOPHILS RELATIVE PERCENT: 0.4 % (ref 0–2)
BILIRUB SERPL-MCNC: 0.9 MG/DL (ref 0–1.2)
BUN BLDV-MCNC: 7 MG/DL (ref 6–20)
CALCIUM SERPL-MCNC: 8.5 MG/DL (ref 8.6–10.2)
CHLORIDE BLD-SCNC: 104 MMOL/L (ref 98–107)
CO2: 21 MMOL/L (ref 22–29)
CREAT SERPL-MCNC: 0.8 MG/DL (ref 0.7–1.2)
EOSINOPHILS ABSOLUTE: 0.12 E9/L (ref 0.05–0.5)
EOSINOPHILS RELATIVE PERCENT: 1.6 % (ref 0–6)
GFR SERPL CREATININE-BSD FRML MDRD: >60 ML/MIN/1.73
GLUCOSE BLD-MCNC: 113 MG/DL (ref 74–99)
HAV IGM SER IA-ACNC: ABNORMAL
HCT VFR BLD CALC: 41.1 % (ref 37–54)
HEMOGLOBIN: 14.7 G/DL (ref 12.5–16.5)
HEPATITIS B CORE IGM ANTIBODY: ABNORMAL
HEPATITIS B SURFACE ANTIGEN INTERPRETATION: ABNORMAL
HEPATITIS C ANTIBODY INTERPRETATION: REACTIVE
IMMATURE GRANULOCYTES #: 0.05 E9/L
IMMATURE GRANULOCYTES %: 0.7 % (ref 0–5)
LYMPHOCYTES ABSOLUTE: 0.98 E9/L (ref 1.5–4)
LYMPHOCYTES RELATIVE PERCENT: 12.8 % (ref 20–42)
MAGNESIUM: 1.8 MG/DL (ref 1.6–2.6)
MCH RBC QN AUTO: 32.8 PG (ref 26–35)
MCHC RBC AUTO-ENTMCNC: 35.8 % (ref 32–34.5)
MCV RBC AUTO: 91.7 FL (ref 80–99.9)
MONOCYTES ABSOLUTE: 0.78 E9/L (ref 0.1–0.95)
MONOCYTES RELATIVE PERCENT: 10.2 % (ref 2–12)
NEUTROPHILS ABSOLUTE: 5.69 E9/L (ref 1.8–7.3)
NEUTROPHILS RELATIVE PERCENT: 74.3 % (ref 43–80)
PDW BLD-RTO: 13.4 FL (ref 11.5–15)
PHOSPHORUS: 2.7 MG/DL (ref 2.5–4.5)
PLATELET # BLD: 169 E9/L (ref 130–450)
PMV BLD AUTO: 9.4 FL (ref 7–12)
POTASSIUM SERPL-SCNC: 4.4 MMOL/L (ref 3.5–5)
RBC # BLD: 4.48 E12/L (ref 3.8–5.8)
SODIUM BLD-SCNC: 133 MMOL/L (ref 132–146)
TOTAL PROTEIN: 6.6 G/DL (ref 6.4–8.3)
URINE CULTURE, ROUTINE: NORMAL
WBC # BLD: 7.7 E9/L (ref 4.5–11.5)

## 2023-02-24 PROCEDURE — 6360000002 HC RX W HCPCS: Performed by: INTERNAL MEDICINE

## 2023-02-24 PROCEDURE — 6360000002 HC RX W HCPCS

## 2023-02-24 PROCEDURE — 84100 ASSAY OF PHOSPHORUS: CPT

## 2023-02-24 PROCEDURE — 83735 ASSAY OF MAGNESIUM: CPT

## 2023-02-24 PROCEDURE — 99233 SBSQ HOSP IP/OBS HIGH 50: CPT | Performed by: INTERNAL MEDICINE

## 2023-02-24 PROCEDURE — 2580000003 HC RX 258: Performed by: INTERNAL MEDICINE

## 2023-02-24 PROCEDURE — 6360000002 HC RX W HCPCS: Performed by: FAMILY MEDICINE

## 2023-02-24 PROCEDURE — 80053 COMPREHEN METABOLIC PANEL: CPT

## 2023-02-24 PROCEDURE — 85025 COMPLETE CBC W/AUTO DIFF WBC: CPT

## 2023-02-24 PROCEDURE — 6370000000 HC RX 637 (ALT 250 FOR IP): Performed by: INTERNAL MEDICINE

## 2023-02-24 PROCEDURE — 2580000003 HC RX 258: Performed by: FAMILY MEDICINE

## 2023-02-24 PROCEDURE — 2060000000 HC ICU INTERMEDIATE R&B

## 2023-02-24 RX ORDER — MAGNESIUM SULFATE IN WATER 40 MG/ML
2000 INJECTION, SOLUTION INTRAVENOUS ONCE
Status: COMPLETED | OUTPATIENT
Start: 2023-02-24 | End: 2023-02-24

## 2023-02-24 RX ORDER — WATER 1000 ML/1000ML
INJECTION, SOLUTION INTRAVENOUS
Status: DISPENSED
Start: 2023-02-24 | End: 2023-02-24

## 2023-02-24 RX ORDER — ZIPRASIDONE MESYLATE 20 MG/ML
INJECTION, POWDER, LYOPHILIZED, FOR SOLUTION INTRAMUSCULAR
Status: DISPENSED
Start: 2023-02-24 | End: 2023-02-24

## 2023-02-24 RX ORDER — OLANZAPINE 5 MG/1
15 TABLET, ORALLY DISINTEGRATING ORAL NIGHTLY
Status: DISCONTINUED | OUTPATIENT
Start: 2023-02-24 | End: 2023-03-01 | Stop reason: HOSPADM

## 2023-02-24 RX ORDER — OLANZAPINE 5 MG/1
10 TABLET, ORALLY DISINTEGRATING ORAL NIGHTLY
Status: DISCONTINUED | OUTPATIENT
Start: 2023-02-24 | End: 2023-02-24

## 2023-02-24 RX ADMIN — LORAZEPAM 4 MG: 2 INJECTION INTRAMUSCULAR; INTRAVENOUS at 22:07

## 2023-02-24 RX ADMIN — MAGNESIUM SULFATE HEPTAHYDRATE 2000 MG: 40 INJECTION, SOLUTION INTRAVENOUS at 07:49

## 2023-02-24 RX ADMIN — ZIPRASIDONE MESYLATE 20 MG: 20 INJECTION, POWDER, LYOPHILIZED, FOR SOLUTION INTRAMUSCULAR at 11:30

## 2023-02-24 RX ADMIN — LORAZEPAM 4 MG: 2 INJECTION INTRAMUSCULAR; INTRAVENOUS at 21:07

## 2023-02-24 RX ADMIN — SODIUM CHLORIDE, PRESERVATIVE FREE 10 ML: 5 INJECTION INTRAVENOUS at 20:33

## 2023-02-24 RX ADMIN — LORAZEPAM 4 MG: 2 INJECTION INTRAMUSCULAR; INTRAVENOUS at 23:30

## 2023-02-24 RX ADMIN — ENOXAPARIN SODIUM 40 MG: 100 INJECTION SUBCUTANEOUS at 07:54

## 2023-02-24 RX ADMIN — SODIUM CHLORIDE 125 MG: 9 INJECTION, SOLUTION INTRAVENOUS at 12:02

## 2023-02-24 RX ADMIN — LORAZEPAM 1 MG: 2 INJECTION INTRAMUSCULAR; INTRAVENOUS at 07:56

## 2023-02-24 RX ADMIN — OLANZAPINE 15 MG: 5 TABLET, ORALLY DISINTEGRATING ORAL at 20:38

## 2023-02-24 RX ADMIN — SODIUM CHLORIDE, PRESERVATIVE FREE 10 ML: 5 INJECTION INTRAVENOUS at 07:56

## 2023-02-24 RX ADMIN — PHENOBARBITAL SODIUM 32.5 MG: 65 INJECTION INTRAMUSCULAR at 07:56

## 2023-02-24 RX ADMIN — LORAZEPAM 4 MG: 2 INJECTION INTRAMUSCULAR; INTRAVENOUS at 19:57

## 2023-02-24 RX ADMIN — PHENOBARBITAL SODIUM 32.5 MG: 65 INJECTION INTRAMUSCULAR at 20:29

## 2023-02-24 ASSESSMENT — PAIN SCALES - GENERAL
PAINLEVEL_OUTOF10: 0

## 2023-02-24 NOTE — PROGRESS NOTES
[address] [facility]  Department of Internal Medicine   Internal Medicine Residency   MICU Progress Note    Patient:  Melina Bacon 45 y.o. male  MRN: [id number]     Date of Service: 2/24/2023    Allergy: Red dye    Subjective     I have seen and examine the patient at bed side. Patient wanted to leave AMA. Patient was not able to walk properly, after a long discussion, patient was incapacitated to make a decision. He doesn't understand the consequence of leaving the facility. He is on CIWA protocol. Security was called, but couldn't force him to stay until the patient is pink slip. Considering his situation, patient was pink slipped.    Objective     VS: BP (!) 138/113   Pulse (!) 102   Temp 98.8 °F (37.1 °C) (Tympanic)   Resp 17   Wt 188 lb (85.3 kg)   SpO2 98%   BMI 26.98 kg/m²           I & O - 24hr:   Intake/Output Summary (Last 24 hours) at 2/24/2023 1850  Last data filed at 2/24/2023 0600  Gross per 24 hour   Intake --   Output 300 ml   Net -300 ml       Physical Exam:  General Appearance: He was intubated and sedated  Neck: no adenopathy, no carotid bruit, no JVD, supple, symmetrical, trachea midline, and thyroid not enlarged, symmetric, no tenderness/mass/nodules  Lung: clear to auscultation bilaterally  Heart: regular rate and rhythm, S1, S2 normal, no murmur, click, rub or gallop  Abdomen: soft, non-tender; bowel sounds normal; no masses,  no organomegaly  Extremities:  extremities normal, atraumatic, no cyanosis or edema  Musculoskeletal: No joint swelling,    Neurologic: Mental status: Intubated and sedated    Lines     site day    Art line   None    TLC None    PICC None    Hemoaccess None    ABG:     Lab Results   Component Value Date/Time    PH 7.429 02/22/2023 04:30 AM    PCO2 33.9 02/22/2023 04:30 AM    PO2 85.3 02/22/2023 04:30 AM    HCO3 21.9 02/22/2023 04:30 AM    BE -1.7 02/22/2023 04:30 AM    THB 13.3 02/22/2023 04:30 AM    O2SAT 96.4 02/22/2023 04:30 AM        Medications     Labs CBC with Differential:    Lab Results   Component Value Date/Time    WBC 7.7 02/24/2023 04:39 AM    RBC 4.48 02/24/2023 04:39 AM    HGB 14.7 02/24/2023 04:39 AM    HCT 41.1 02/24/2023 04:39 AM     02/24/2023 04:39 AM    MCV 91.7 02/24/2023 04:39 AM    MCH 32.8 02/24/2023 04:39 AM    MCHC 35.8 02/24/2023 04:39 AM    RDW 13.4 02/24/2023 04:39 AM    LYMPHOPCT 12.8 02/24/2023 04:39 AM    MONOPCT 10.2 02/24/2023 04:39 AM    BASOPCT 0.4 02/24/2023 04:39 AM    MONOSABS 0.78 02/24/2023 04:39 AM    LYMPHSABS 0.98 02/24/2023 04:39 AM    EOSABS 0.12 02/24/2023 04:39 AM    BASOSABS 0.03 02/24/2023 04:39 AM     CMP:    Lab Results   Component Value Date/Time     02/24/2023 04:39 AM    K 4.4 02/24/2023 04:39 AM     02/24/2023 04:39 AM    CO2 21 02/24/2023 04:39 AM    BUN 7 02/24/2023 04:39 AM    CREATININE 0.8 02/24/2023 04:39 AM    GFRAA >60 05/25/2018 07:32 AM    LABGLOM >60 02/24/2023 04:39 AM    GLUCOSE 113 02/24/2023 04:39 AM    PROT 6.6 02/24/2023 04:39 AM    LABALBU 3.6 02/24/2023 04:39 AM    CALCIUM 8.5 02/24/2023 04:39 AM    BILITOT 0.9 02/24/2023 04:39 AM    ALKPHOS 46 02/24/2023 04:39 AM    AST 65 02/24/2023 04:39 AM     02/24/2023 04:39 AM           Resident's Assessment and Plan     Neurology:   Acute encephalopathy likely 2/2 drug overdose, resolved  -Patient has a multiple history of hospitalization with history of drug overdose  -CT head negative for intra cranial abnormalities, all labs are normal  -UDS positive for Fentanyl, Cocaine, Marijuana; Serum drug screen positive for Ethanol  -Patient extubated on 2/22/23 at 11: 30 AM  -On CIWA protocol    2. Alcohol withdrawal  -On CIWA protocol. He is requiring Ativan, and Phenobarb. Cardiology:   History of atrial Fibrillation  -No documented history of treatment of A. Fibrillation.      Pulmonary:   Acute hypoxic respiratory failure likely 2/2 drug over dose, resolved  -Extubated, tolerating NC well  -patient is on 2 L NC  -Monitor respiratory status for 24 hours    Nephrology (Fluids/ Electrolytes & Nutrition):   HAGMA likely 2/2 lactic acidosis, resolved  -lactic acid on admission 8.7, resolved after fluid given    Gastroenterology:   Transaminitis likely 2/2 alcohol  -Monitor daily hepatic panel  -Follow up hepatitis C panel, and HIV panel.      PT/OT: PT/OT evaluation  DVT ppx: Lovenox  GI ppx: Protonix        Shanelle Smith MD, PGY-1    Attending physician: Dr. Deanna Hay

## 2023-02-24 NOTE — PLAN OF CARE
Problem: Pain  Goal: Verbalizes/displays adequate comfort level or baseline comfort level  Outcome: Progressing     Problem: Safety - Adult  Goal: Free from fall injury  Outcome: Progressing     Problem: Neurosensory - Adult  Goal: Achieves stable or improved neurological status  Outcome: Progressing     Problem: Respiratory - Adult  Goal: Achieves optimal ventilation and oxygenation  Outcome: Progressing     Problem: Cardiovascular - Adult  Goal: Maintains optimal cardiac output and hemodynamic stability  Outcome: Progressing     Problem: Musculoskeletal - Adult  Goal: Return mobility to safest level of function  Outcome: Progressing

## 2023-02-24 NOTE — PROGRESS NOTES
Attending Attestation Note:    I have personally seen and examined the patient in the ICU on 02/24/23. I discussed the case in detail with the resident, nursing and respiratory therapist as needed. I reviewed the pertinent laboratory studies, imaging studies, and records. Key elements of the encounter were performed by me (> 85 % time). Family is updated at the bedside as available. Key issues of the case were discussed among consultants. Past medical history, surgical history, family history, and social history are unchanged unless stated in the history of present illness. I have reviewed the patient's medications and allergies. Please see separate resident note. Acute metabolic/toxic encephalopathy  Respiratory failure/intubated for airway protection -extubated 2/23  Alcohol/opiate withdrawal     Extubated 2/22  CIWA and phenobarb for alcohol withdrawal  Today patient was confused, and was trying to walk out of the hospital, had an unsteady gait, unable to hold himself steady and wanting to leave the hospital on his hospital gown when it was minus degree temperatures outside. He had incoherent speech and did not express understanding of his medical condition, proper reasoning for leaving, denying he has any gait issues and risks of leaving outside without proper attire. He was placed on involuntary hold with consult placed to psychiatry for evaluation. Sitter by bedside.     Mary Ervin MD MS  Pulmonary & [facility]

## 2023-02-24 NOTE — ACP (ADVANCE CARE PLANNING)
Advance Care Planning   Healthcare Decision Maker:    Primary Decision Maker: [facility] [phone]    Click here to complete Healthcare Decision Makers including selection of the Healthcare Decision Maker Relationship (ie \"Primary\").

## 2023-02-24 NOTE — PROGRESS NOTES
0745-Patient attempts to get out of bed to leave hospital, patient is a/o x4 told patient that the doctor needs to see him first.  Patient insists he needs to leave. Resident notified. 0800-Patient is getting aggressive verbally with this nurse and stating he is getting his stuff and leaving. Patient on assessment appears to be drowsy but agitated. 9737-  Patient continues to be aggressive and not redirectable with staff. [facility] police called to assist with redirecting patient. Tayler-  Resident in to speak with patient about possible leaving AMA. 3931-  Getting patient ready to leave AMA, patient got himself dressed and ready to go, however, he is not able to walk independently. He is ataxic . Resident notified. It has been decided that patient is not able to leave on his own and patient not understanding his ability to ambulate is only with assistance and he would not be safe leaving on his own. 0930- Patient attempting to leave again, [facility] police called for assistance. Patient got back into bed .

## 2023-02-24 NOTE — PROGRESS NOTES
Patient:   TRINO GALVEZ            MRN: [id number]            FIN: [id number]              Age:   55 years     Sex:  FEMALE     :  [birth year]   Associated Diagnoses:   None   Author:   CHELSEY WERNER     Postoperative Information     Postoperative Information   Postoperative Information:          Preoperative Diagnosis:    COLON CANCER SCREENING GASTRO ESOPHAGEAL REFLUX DISEASE  .         Postoperative Diagnosis:    GASTRITIS, GASTRIC POLYPS, DUODENAL DIVERTICULUM, HIATAL HERNIA, DIVERTICULOSIS, COLON POLYPS, PELVIC ADHESIONS, FAIR PREP  .         Performed by: CHELSEY WERNER.         Assistant: None.         Surgical Tasks Performed by Assistant: None.         Procedures Performed:    EGD/Colonoscopy GI, EGD WITH BX / COLONOSCOPY WITH BX  .         Findings: See findings above.         Specimens Removed: See below for specimen details.         Estimated Blood Loss: 6  ml.         Blood Administered: No.         Complications: None.         Grafts/Implants: None.    Anesthetic utilized:  Monitored anesthesia care.   OT SESSION ATTEMPT     Date:2023  Patient Name: Analilia Camarena  MRN: [id number]  : 1970  Room: [address] [place] [address]     Attempted OT session this date with pt refusing OT services stating, \"I'm walking out of here in twenty minutes whether you all like it or not. \" Pt encouraged to participate in therapy; however, continued to decline services. Will reattempt OT evaluation at a later time.     [address] [place] [address], OTR/L, [id number]

## 2023-02-24 NOTE — PROGRESS NOTES
Hospitalist Progress Note            Patient: Dee Richards Age: 45 y.o.   DOA: 2/21/2023 Admit Dx / CC: Acute respiratory failure ([address] [J96.00]   LOS:  LOS: 3 days      Assessment/ Plan:     45 y.o. male with past medical history of drug abuse atrial fibrillation hypertension . Patient was admitted due to unresponsive. Patient was found lying down behind the soup kitchen . EMS was called and the patient was found obtunded . Patient received 10 mg Narcan and he woke up and started screaming  . Patient was unable to protect airway and was intubated. UDS +ve THC, cocaine, fentanyl, tylenol and salicylate. Extubated 2/22 but went into severe alcohol withdrawal and started on phenobarb protocol. continues to be mx'ed in ICU    Acute metabolic encephalopathy& acute resp failure 2/2 inability to protect airway 2/2 unintentional drug overdose in pt with polysubstance abuse  UDS is positive for cocaine, fentanyl and cannabinoids. Patient adamantly denies use and says he does not know what happened prior to coming to the hospital  patient has history of multiple drug overdoses. Patient received Narcan in the field.   S/p extubation 2/22  Remains NPO in ICU  Mx per PCCM while in ICU  SW consulted    DTs 2/2 severe Alcohol abuse/dependence   CIWA and phenobarb   Now has bedside sitter  Mx per ICU team while in ICU     Hx/o P-Atrial fibrillation  monitor     High anion gap metabolic acidosis 2/2 lactic acidosis- resolved     Transaminitis likely 2/2 EtOH, improving  hepatitis C panel neg  HIV scrn neg    Physical deconditioning  PT/OT eval    Vitamin D deficiency  Started replacement    Iron deficiency anemia  Loading with IV iron    DVT ppx:  Lovenox  Code status: Full code    Plan discharge when stable    Plan of care discussed with: bedside RN    Patient Active Problem List   Diagnosis    Atrial fibrillation ([address]    SVT (supraventricular tachycardia) ([address]    Viral cardiomyopathy ([address]    Acute respiratory failure (HCC)    Accidental drug overdose    Polysubstance abuse ([facility]    Noncompliance    HTN (hypertension)    Cocaine abuse ([facility]    Narcotic abuse ([facility]    Marijuana abuse    [place] coma scale total score 3-8 (HCC)    Alcoholism ([facility]    Alcohol abuse    Alcohol dependence ([facility]    Iron deficiency anemia    DTs (delirium tremens) ([facility])        Medications:  Reviewed    Infusion Medications    sodium chloride Stopped (02/23/23 0553)     Scheduled Medications    ziprasidone        sterile water        vitamin D  50,000 Units Oral Weekly    vitamin D  2,000 Units Oral Daily    vitamin B-12  1,000 mcg Oral Daily    ferric gluconate (FERRLECIT) IVPB  125 mg IntraVENous Daily    PHENobarbital  32.5 mg IntraVENous BID    Followed by    Korey Joshua ON 2/25/2023] PHENobarbital  16.2 mg IntraVENous BID    nicotine  1 patch TransDERmal Daily    folic acid  1 mg Oral Daily    pantoprazole  40 mg Oral QAM AC    sodium chloride flush  5-40 mL IntraVENous 2 times per day    enoxaparin  40 mg SubCUTAneous Daily    thiamine (VITAMIN B1) IVPB  250 mg IntraVENous Q24H    Followed by    Korey Joshua ON 2/28/2023] thiamine  100 mg Oral Daily     PRN Meds: LORazepam, ipratropium-albuterol, LORazepam, LORazepam, LORazepam, Menthol, sodium chloride flush, sodium chloride, ondansetron **OR** ondansetron, polyethylene glycol, acetaminophen **OR** acetaminophen    I/O    Intake/Output Summary (Last 24 hours) at 2/24/2023 1503  Last data filed at 2/24/2023 0600  Gross per 24 hour   Intake --   Output 850 ml   Net -850 ml       Labs:   Recent Labs     02/22/23  0416 02/23/23  0414 02/24/23  0439   WBC 6.0 6.8 7.7   HGB 12.3* 13.1 14.7   HCT 35.3* 36.8* 41.1    144 169       Recent Labs     02/23/23  0024 02/23/23  0414 02/23/23  1000 02/24/23  0439    133 139 133   K 3.7 3.8 4.0 4.4    103 108* 104   CO2 22 23 23 21*   BUN 9 8 6 7   CREATININE 0.8 0.8 0.7 0.8   CALCIUM 7.9* 8.1* 8.4* 8.5*   PHOS 3.3 2.7  --  2.7 Recent Labs     02/22/23  0416 02/22/23  1759 02/23/23  0414 02/24/23  0439   PROT 5.3* 6.0* 6.0* 6.6   ALKPHOS 36* 43 46 46   * 137* 117* 109*   * 95* 76* 65*   BILITOT 0.8 0.6 0.7 0.9   LIPASE 13  --   --   --        No results for input(s): INR in the last 72 hours. Recent Labs     02/22/23  1139   CKTOTAL 211*       Chronic labs:  Lab Results   Component Value Date    TRIG 36 02/23/2023    TSH 1.600 02/23/2023    INR 0.9 02/21/2023       Radiology:  Imaging studies reviewed today. Subjective:     Aashish Moses is extremely agitated, hollering to anyone who passes by his room, in 4 point restraint and still trying to get out of bed    Objective:     Physical Exam:   BP (!) 138/113   Pulse (!) 102   Temp 98.8 °F (37.1 °C) (Tympanic)   Resp 17   Wt 188 lb (85.3 kg)   SpO2 98%   BMI 26.98 kg/m²     General appearance:is agitated  Lungs: Clear to auscultation bilaterally, no wheezing or crackles   Heart: Regular rate and rhythm, S1, S2 normal   Abdomen: Soft, non-tender and not-distended.  Bowel sounds normal.   Extremities: no edema   Neurologic: agitated and restless in bed despite 4 point restrains      Favio Barber MD   Hospitalist Service   02/24/23 3:03 PM

## 2023-02-24 NOTE — CARE COORDINATION
Care Coordination: LOS 3. Extubated 2/22. Currently room air, code violet. [place], pink slipped- pt wanted to leave, struggling to ambulate- refused therapy this morning. Continues on CIWA scale. SW from [facility] was able to assist with pt's residence  pt lives at [place] [address] [address] Emergency contact for father Anusha List, is same as mom . Also, wife is listed as contact but wants nothing to do with pt. Placed call to father Anusha List, updated contact list. Uncertain why Sergio Crane is on contact list as they have been  for 3 years, also Catarina Jeff is his Mother in law, who Anusha List states, \" Hates my son and was cause of separation\"- they want those 2 names removed. Pt also has a brother Alie Plasencia. 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 Parents were sending him 100.00 weekly for groceries and he was doing well and taking bus, does not have car. He obtained a faustina job from [employer] and was working 6 days a week and was doing well and they are not sure what triggered the relapse. They feel it was because he got a good paycheck and falls in with the wrong crowd and buys \"dope\". Updated father on current condition, they are relieved he was pink slipped as they want him to obtain the treatment that is needed.   They are hopeful he can get back to where he was prior to this relapse  Jae medley [phone] and Yenny hu [phone] Removed from contact list and their number placed on list    Valentina Zamudio

## 2023-02-25 LAB
ALBUMIN SERPL-MCNC: 4 G/DL (ref 3.5–5.2)
ALP BLD-CCNC: 49 U/L (ref 40–129)
ALT SERPL-CCNC: 100 U/L (ref 0–40)
ANION GAP SERPL CALCULATED.3IONS-SCNC: 16 MMOL/L (ref 7–16)
AST SERPL-CCNC: 76 U/L (ref 0–39)
BACTERIA: ABNORMAL /HPF
BASOPHILS ABSOLUTE: 0.04 E9/L (ref 0–0.2)
BASOPHILS RELATIVE PERCENT: 0.5 % (ref 0–2)
BILIRUB SERPL-MCNC: 1 MG/DL (ref 0–1.2)
BILIRUBIN URINE: ABNORMAL
BLOOD, URINE: NEGATIVE
BUN BLDV-MCNC: 15 MG/DL (ref 6–20)
CALCIUM SERPL-MCNC: 9.5 MG/DL (ref 8.6–10.2)
CHLORIDE BLD-SCNC: 104 MMOL/L (ref 98–107)
CLARITY: CLEAR
CO2: 21 MMOL/L (ref 22–29)
COLOR: YELLOW
CREAT SERPL-MCNC: 0.8 MG/DL (ref 0.7–1.2)
EOSINOPHILS ABSOLUTE: 0.03 E9/L (ref 0.05–0.5)
EOSINOPHILS RELATIVE PERCENT: 0.4 % (ref 0–6)
GFR SERPL CREATININE-BSD FRML MDRD: >60 ML/MIN/1.73
GLUCOSE BLD-MCNC: 155 MG/DL (ref 74–99)
GLUCOSE URINE: NEGATIVE MG/DL
HCT VFR BLD CALC: 45.2 % (ref 37–54)
HEMOGLOBIN: 16.3 G/DL (ref 12.5–16.5)
IMMATURE GRANULOCYTES #: 0.05 E9/L
IMMATURE GRANULOCYTES %: 0.6 % (ref 0–5)
KETONES, URINE: >=80 MG/DL
LEUKOCYTE ESTERASE, URINE: NEGATIVE
LYMPHOCYTES ABSOLUTE: 1.28 E9/L (ref 1.5–4)
LYMPHOCYTES RELATIVE PERCENT: 16.1 % (ref 20–42)
MAGNESIUM: 1.9 MG/DL (ref 1.6–2.6)
MCH RBC QN AUTO: 32 PG (ref 26–35)
MCHC RBC AUTO-ENTMCNC: 36.1 % (ref 32–34.5)
MCV RBC AUTO: 88.6 FL (ref 80–99.9)
MONOCYTES ABSOLUTE: 1.09 E9/L (ref 0.1–0.95)
MONOCYTES RELATIVE PERCENT: 13.7 % (ref 2–12)
NEUTROPHILS ABSOLUTE: 5.46 E9/L (ref 1.8–7.3)
NEUTROPHILS RELATIVE PERCENT: 68.7 % (ref 43–80)
NITRITE, URINE: NEGATIVE
PDW BLD-RTO: 13.7 FL (ref 11.5–15)
PH UA: 6 (ref 5–9)
PHOSPHORUS: 3 MG/DL (ref 2.5–4.5)
PLATELET # BLD: 232 E9/L (ref 130–450)
PMV BLD AUTO: 9.2 FL (ref 7–12)
POTASSIUM SERPL-SCNC: 3.8 MMOL/L (ref 3.5–5)
PROTEIN UA: NEGATIVE MG/DL
RBC # BLD: 5.1 E12/L (ref 3.8–5.8)
RBC UA: ABNORMAL /HPF (ref 0–2)
SODIUM BLD-SCNC: 141 MMOL/L (ref 132–146)
SPECIFIC GRAVITY UA: >=1.03 (ref 1–1.03)
TOTAL PROTEIN: 7.1 G/DL (ref 6.4–8.3)
UROBILINOGEN, URINE: 1 E.U./DL
WBC # BLD: 8 E9/L (ref 4.5–11.5)
WBC UA: ABNORMAL /HPF (ref 0–5)

## 2023-02-25 PROCEDURE — 6360000002 HC RX W HCPCS: Performed by: INTERNAL MEDICINE

## 2023-02-25 PROCEDURE — 2580000003 HC RX 258: Performed by: FAMILY MEDICINE

## 2023-02-25 PROCEDURE — 2580000003 HC RX 258: Performed by: INTERNAL MEDICINE

## 2023-02-25 PROCEDURE — 2000000000 HC ICU R&B

## 2023-02-25 PROCEDURE — 6370000000 HC RX 637 (ALT 250 FOR IP): Performed by: INTERNAL MEDICINE

## 2023-02-25 PROCEDURE — 6360000002 HC RX W HCPCS

## 2023-02-25 PROCEDURE — 80053 COMPREHEN METABOLIC PANEL: CPT

## 2023-02-25 PROCEDURE — 2500000003 HC RX 250 WO HCPCS: Performed by: INTERNAL MEDICINE

## 2023-02-25 PROCEDURE — 92610 EVALUATE SWALLOWING FUNCTION: CPT | Performed by: SPEECH-LANGUAGE PATHOLOGIST

## 2023-02-25 PROCEDURE — 6360000002 HC RX W HCPCS: Performed by: FAMILY MEDICINE

## 2023-02-25 PROCEDURE — 83735 ASSAY OF MAGNESIUM: CPT

## 2023-02-25 PROCEDURE — 99291 CRITICAL CARE FIRST HOUR: CPT | Performed by: INTERNAL MEDICINE

## 2023-02-25 PROCEDURE — 84100 ASSAY OF PHOSPHORUS: CPT

## 2023-02-25 PROCEDURE — 81001 URINALYSIS AUTO W/SCOPE: CPT

## 2023-02-25 PROCEDURE — 85025 COMPLETE CBC W/AUTO DIFF WBC: CPT

## 2023-02-25 PROCEDURE — 36415 COLL VENOUS BLD VENIPUNCTURE: CPT

## 2023-02-25 RX ORDER — PHENOBARBITAL SODIUM 65 MG/ML
32.5 INJECTION INTRAMUSCULAR EVERY 6 HOURS PRN
Status: DISCONTINUED | OUTPATIENT
Start: 2023-02-26 | End: 2023-02-25

## 2023-02-25 RX ORDER — PHENOBARBITAL SODIUM 65 MG/ML
32.5 INJECTION INTRAMUSCULAR 4 TIMES DAILY
Status: DISCONTINUED | OUTPATIENT
Start: 2023-02-26 | End: 2023-02-25

## 2023-02-25 RX ORDER — PHENOBARBITAL SODIUM 65 MG/ML
32.5 INJECTION INTRAMUSCULAR EVERY 6 HOURS
Status: DISPENSED | OUTPATIENT
Start: 2023-02-25 | End: 2023-02-26

## 2023-02-25 RX ORDER — PHENOBARBITAL SODIUM 65 MG/ML
65 INJECTION INTRAMUSCULAR 4 TIMES DAILY
Status: DISCONTINUED | OUTPATIENT
Start: 2023-02-25 | End: 2023-02-25

## 2023-02-25 RX ORDER — DIAZEPAM 5 MG/1
10 TABLET ORAL ONCE
Status: DISCONTINUED | OUTPATIENT
Start: 2023-02-25 | End: 2023-02-25

## 2023-02-25 RX ORDER — DEXTROSE AND SODIUM CHLORIDE 5; .45 G/100ML; G/100ML
INJECTION, SOLUTION INTRAVENOUS CONTINUOUS
Status: DISCONTINUED | OUTPATIENT
Start: 2023-02-25 | End: 2023-02-27

## 2023-02-25 RX ORDER — PHENOBARBITAL SODIUM 65 MG/ML
16.2 INJECTION INTRAMUSCULAR 2 TIMES DAILY
Status: DISCONTINUED | OUTPATIENT
Start: 2023-02-28 | End: 2023-02-25

## 2023-02-25 RX ORDER — PHENOBARBITAL SODIUM 65 MG/ML
16.2 INJECTION INTRAMUSCULAR EVERY 6 HOURS PRN
Status: DISCONTINUED | OUTPATIENT
Start: 2023-02-28 | End: 2023-02-25

## 2023-02-25 RX ORDER — PHENOBARBITAL SODIUM 65 MG/ML
32.5 INJECTION INTRAMUSCULAR 2 TIMES DAILY
Status: DISCONTINUED | OUTPATIENT
Start: 2023-02-27 | End: 2023-02-25

## 2023-02-25 RX ORDER — PHENOBARBITAL SODIUM 65 MG/ML
65 INJECTION INTRAMUSCULAR EVERY 6 HOURS PRN
Status: DISCONTINUED | OUTPATIENT
Start: 2023-02-25 | End: 2023-02-25

## 2023-02-25 RX ADMIN — LORAZEPAM 4 MG: 2 INJECTION INTRAMUSCULAR; INTRAVENOUS at 00:52

## 2023-02-25 RX ADMIN — DEXTROSE AND SODIUM CHLORIDE: 5; 450 INJECTION, SOLUTION INTRAVENOUS at 10:11

## 2023-02-25 RX ADMIN — OLANZAPINE 15 MG: 5 TABLET, ORALLY DISINTEGRATING ORAL at 20:58

## 2023-02-25 RX ADMIN — ZIPRASIDONE MESYLATE 20 MG: 20 INJECTION, POWDER, LYOPHILIZED, FOR SOLUTION INTRAMUSCULAR at 01:09

## 2023-02-25 RX ADMIN — SODIUM CHLORIDE, PRESERVATIVE FREE 10 ML: 5 INJECTION INTRAVENOUS at 20:59

## 2023-02-25 RX ADMIN — LORAZEPAM 4 MG: 2 INJECTION INTRAMUSCULAR; INTRAVENOUS at 08:58

## 2023-02-25 RX ADMIN — THIAMINE HYDROCHLORIDE 250 MG: 100 INJECTION, SOLUTION INTRAMUSCULAR; INTRAVENOUS at 22:39

## 2023-02-25 RX ADMIN — PHENOBARBITAL SODIUM 32.5 MG: 65 INJECTION INTRAMUSCULAR at 19:36

## 2023-02-25 RX ADMIN — LORAZEPAM 4 MG: 2 INJECTION INTRAMUSCULAR; INTRAVENOUS at 12:39

## 2023-02-25 RX ADMIN — SODIUM CHLORIDE, PRESERVATIVE FREE 10 ML: 5 INJECTION INTRAVENOUS at 08:59

## 2023-02-25 RX ADMIN — DEXTROSE AND SODIUM CHLORIDE 1000 ML: 5; 450 INJECTION, SOLUTION INTRAVENOUS at 17:49

## 2023-02-25 RX ADMIN — LORAZEPAM 4 MG: 2 INJECTION INTRAMUSCULAR; INTRAVENOUS at 15:14

## 2023-02-25 RX ADMIN — SODIUM CHLORIDE 125 MG: 9 INJECTION, SOLUTION INTRAVENOUS at 09:13

## 2023-02-25 RX ADMIN — DEXMEDETOMIDINE 0.2 MCG/KG/HR: 100 INJECTION, SOLUTION INTRAVENOUS at 19:37

## 2023-02-25 RX ADMIN — ENOXAPARIN SODIUM 40 MG: 100 INJECTION SUBCUTANEOUS at 09:02

## 2023-02-25 RX ADMIN — PHENOBARBITAL SODIUM 32.5 MG: 65 INJECTION INTRAMUSCULAR at 08:59

## 2023-02-25 ASSESSMENT — PAIN SCALES - GENERAL
PAINLEVEL_OUTOF10: 0
PAINLEVEL_OUTOF10: 0

## 2023-02-25 NOTE — PROGRESS NOTES
Hospitalist Progress Note      Synopsis: Patient admitted on 2/21/2023   45 gentleman with history of drug use and A-fib and hypertension. He was found laying down behind the soup kitchen. Patient was positive for optimization. His images resuscitation including Narcan were given and he was starting to have significant behavior issues including screaming. He was intubated because of lack of protection of airway. He remained in the ICU and he was finally extubated on the 22nd. He has been diagnosed with behavioral disorder and agitation due to severe alcohol withdrawal.  He is now on a monitored floor as of now and continues to receive significant dosing of medications because of [facility] protocol. ICU stay included imaging of his brain which did not show any acute issues. CT of the cervical spine was negative. Due to him being pink slipped it was important to get psych services to see him and they feel that he is still in delirium and they would like him medically cleared till he is able to be moved to their floor  Subjective  He remains in four-point restraints. He is trying to sit up. Does not really follow all commands but does try to follow some    Exam:  BP (!) 159/96   Pulse 91   Temp 98.2 °F (36.8 °C) (Oral)   Resp 24   Wt 169 lb 3.2 oz (76.7 kg)   SpO2 99%   BMI 24.28 kg/m²   General appearance: No apparent distress, appears stated age and cooperative. HEENT: Pupils equal, round, and reactive to light. Conjunctivae/corneas clear. Neck: Trachea midline. Respiratory:  Normal respiratory effort. Clear to auscultation, bilaterally without Rales/Wheezes/Rhonchi. Cardiovascular: Regular rate and rhythm with normal S1/S2 without murmurs, rubs or gallops. Abdomen: Soft, non-tender, non-distended with normal bowel sounds. Musculoskeletal: No clubbing, cyanosis or edema bilaterally. Brisk capillary refill. 2+ lower extremity pulses (dorsalis pedis). Skin:  No rashes    Neurologic: Confused. His voice is slurred. Context he is unable to carry on any conversation. Does move his extremities to command strength is 5 x 5    Medications:  Reviewed    Infusion Medications    dextrose 5 % and 0.45 % NaCl      sodium chloride Stopped (02/23/23 0553)     Scheduled Medications    OLANZapine zydis  15 mg Oral Nightly    vitamin D  50,000 Units Oral Weekly    vitamin D  2,000 Units Oral Daily    vitamin B-12  1,000 mcg Oral Daily    PHENobarbital  16.2 mg IntraVENous BID    nicotine  1 patch TransDERmal Daily    folic acid  1 mg Oral Daily    pantoprazole  40 mg Oral QAM AC    sodium chloride flush  5-40 mL IntraVENous 2 times per day    enoxaparin  40 mg SubCUTAneous Daily    thiamine (VITAMIN B1) IVPB  250 mg IntraVENous Q24H    Followed by    Laurie Merino ON 2/28/2023] thiamine  100 mg Oral Daily     PRN Meds: LORazepam, ipratropium-albuterol, LORazepam, LORazepam, LORazepam, Menthol, sodium chloride flush, sodium chloride, ondansetron **OR** ondansetron, polyethylene glycol, acetaminophen **OR** acetaminophen    I/O    Intake/Output Summary (Last 24 hours) at 2/25/2023 1006  Last data filed at 2/24/2023 1900  Gross per 24 hour   Intake 158.01 ml   Output 2050 ml   Net -1891.99 ml       Labs:   Recent Labs     02/23/23  0414 02/24/23 0439   WBC 6.8 7.7   HGB 13.1 14.7   HCT 36.8* 41.1    169       Recent Labs     02/23/23  0024 02/23/23  0414 02/23/23  1000 02/24/23 0439    133 139 133   K 3.7 3.8 4.0 4.4    103 108* 104   CO2 22 23 23 21*   BUN 9 8 6 7   CREATININE 0.8 0.8 0.7 0.8   CALCIUM 7.9* 8.1* 8.4* 8.5*   PHOS 3.3 2.7  --  2.7       Recent Labs     02/22/23  1759 02/23/23  0414 02/24/23 0439   PROT 6.0* 6.0* 6.6   ALKPHOS 43 46 46   * 117* 109*   AST 95* 76* 65*   BILITOT 0.6 0.7 0.9       No results for input(s): INR in the last 72 hours.     Recent Labs     02/22/23  1139   CKTOTAL 211*       Chronic labs:  Lab Results   Component Value Date    TRIG 36 02/23/2023    TSH 1.600 02/23/2023    INR 0.9 02/21/2023       Microbiology:  Pending  No results for input(s): BC in the last 72 hours. No results for input(s): ORG in the last 72 hours. No results for input(s): [place] in the last 72 hours. No results for input(s): STREPNEUMAGU in the last 72 hours. No results for input(s): LP1UAG in the last 72 hours. No results for input(s): ASO in the last 72 hours. No results for input(s): CULTRESP in the last 72 hours. No results for input(s): PROCAL in the last 72 hours. Radiology:  CT HEAD WO CONTRAST    Result Date: 2/21/2023  No acute intracranial abnormality. Specifically, there is no acute intracranial hemorrhage Right maxillary and bilateral ethmoid sinus disease. CT HEAD WO CONTRAST    Result Date: 2/17/2023  No acute intracranial abnormality. RECOMMENDATIONS: Careful clinical correlation and follow up recommended. CT CERVICAL SPINE WO CONTRAST    Result Date: 2/21/2023  1. There is no acute compression fracture or subluxation of the cervical spine. 2. Multilevel degenerative disc and degenerative joint disease. XR CHEST PORTABLE    Result Date: 2/22/2023  Advancement of endotracheal tube to just above the thoracic inlet. No new abnormal findings. XR CHEST PORTABLE    Result Date: 2/21/2023  High position of endotracheal tube. No acute process. XR CHEST PORTABLE    Result Date: 2/17/2023  No acute process.  Stable exam.     US DUP LOWER EXTREMITY RIGHT MADELIN    Result Date: 2/23/2023  Within the visualized vessels there is no evidence for deep venous thrombosis       ASSESSMENT:    Principal Problem:    Accidental drug overdose  Active Problems:    Acute respiratory failure (HCC)    Polysubstance abuse ([address]    Noncompliance    HTN (hypertension)    Cocaine abuse ([address]    Narcotic abuse ([address]    Marijuana abuse    [place] coma scale total score 3-8 (HCC)    Alcoholism ([address]    Alcohol abuse    Alcohol dependence ([facility]    Iron deficiency anemia    DTs (delirium tremens) (HCC)    Atrial fibrillation (HCC)    SVT (supraventricular tachycardia) ([address]  Resolved Problems:    * No resolved hospital problems. *       PLAN:    1. Patient unable to follow commands or swallow safely. Bedside evaluation yesterday resulted in clinical impression of not safe to swallow  2. We need to insert an NGT and start feeding him to avoid lack of nutrition and toxicities of different medications  3. Follow-up labs for today  4. Order ammonia level as well  5. Start hydration    Addendum  Patient did pass his bedside swallow for right now and we are going to start feeding him with small bite sized foods    Ciwa  is increasing   Back to icu    D/w Maria E Katz and ICU DR Howard    Diet: ADULT DIET; Dysphagia - Soft and Bite Sized; 5 carb choices (75 gm/meal)  Code Status: Full Code  PT/OT Eval Status:   not following commands   DVT Prophylaxis:   lovenox  Recommended disposition at discharge:   tbd     +++++++++++++++++++++++++++++++++++++++++++++++++  David Henson MD   Former[facility].  +++++++++++++++++++++++++++++++++++++++++++++++++  NOTE: This report was transcribed using voice recognition software. Every effort was made to ensure accuracy; however, inadvertent computerized transcription errors may be present.

## 2023-02-25 NOTE — PROGRESS NOTES
SPEECH/LANGUAGE PATHOLOGY  CLINICAL ASSESSMENT OF SWALLOWING FUNCTION   and PLAN OF CARE    PATIENT NAME:  Dee Richards  (male)     MRN:  [id number]    :  1970  (45 y.o.)  STATUS:  Inpatient: Room 7417/7417-A    TODAY'S DATE:  2023  REFERRING PROVIDER:   Arnaud Zelaya MD  SPECIFIC PROVIDER ORDER: SLP swallowing-dysphagia evaluation and treatment Date of order:  23  REASON FOR REFERRAL: Assess swallow fx   EVALUATING THERAPIST: LETITIA Cerda                 RESULTS:    DYSPHAGIA DIAGNOSIS:   Clinical indicators of moderate-severe  oral phase dysphagia     Pt must be awake and maintaining alertness with PO in take      DIET RECOMMENDATIONS:  Pureed consistency solids (IDDSI level 4) with  thin liquids (IDDSI level 0)     MEDICATION ADMINISTRATION, and Administer medication crushed, as able,or whole with pudding/applesauce     FEEDING RECOMMENDATIONS:     Assistance level:  Full assistance is needed during all oral intake      Compensatory strategies recommended: Small bites/sips      Discussed recommendations with nursing and/or faxed report to referring provider: Yes    SPEECH THERAPY  PLAN OF CARE   The dysphagia POC is established based on physician order, dysphagia diagnosis and results of clinical assessment     Skilled SLP intervention for dysphagia management on acute care up to 5 x per week until goals met, pt plateaus in function and/or discharged from hospital    Conditions Requiring Skilled Therapeutic Intervention for dysphagia:    Patient is performing below functional baseline d/t  current acute condition, respiratory compromise, multiple medications, and/or increased dependency upon caregivers.   impaired mastication   patient unable to self feed which increases risk of aspiration pneumonia ([place] et al.,1998.)  At times, patient unable to follow 1 step directions which places them at Higher risk of aspiration SANTINO Maradiaga, & Wash Halt. 2009.)    Specific dysphagia interventions to include:     compensatory swallowing strategies to improve airway protection and swallow function. ongoing mealtime assessment to provide diet modification and compensatory strategy implementation to minimize risk of aspiration associated with PO intake  PO trials of upgraded diet textures with SLP only to determine the least restrictive PO diet     Specific instructions for next treatment:  initiate instruction of compensatory strategies  Patient Treatment Goals:    Short Term Goals:  Pt will implement identified compensatory swallowing strategies on 90% of opportunities or greater to improve airway protection and swallow function. During trials of solids patient will masticate solids fully and recollect bolus leaving only minimal oral residue post swallow on  90% of opportunities or greater  Pt will improve bolus prep/control and mastication with  minimal verbal prompts to advance diet grade by 1 IDDSI level . Long Term Goals:   Pt will maintain adequate nutrition/hydration via PO intake of the least restrictive oral diet with implementation of safe swallow/ compensatory strategies and decrease signs/symptoms of aspiration to less than 1 x/day. Pt will improve oropharyngeal swallow function to ensure airway protection during PO intake to maintain adequate nutrition/hydration and decrease signs/symptoms of aspiration to less than 1 x/day.       Patient/family Goal:    Patient not able to accurately state due to impaired cognition and/or communication at time of eval     Plan of care discussed with Patient   The Patient did not demonstrate complete understanding of the diagnosis, prognosis and plan of care     Rehabilitation Potential/Prognosis: fair-good                    ADMITTING DIAGNOSIS: Acute respiratory failure ([facility] [J96.00]    VISIT DIAGNOSIS:   Visit Diagnoses         Codes    [place] coma scale total score 3-8, at arrival to emergency department [facility])    -  Primary T00.0412    Overdose of undetermined intent, initial encounter     T50.904A    Lactic acidosis     E87.20    ETOH abuse     F10.10             PATIENT REPORT/COMPLAINT: patient not able to accurately report  RN cleared patient for participation in assessment     yes and meal tray present during evaluation     PRIOR LEVEL OF SWALLOW FUNCTION:    PAST HISTORY OF DYSPHAGIA?: none reported    Home diet: Diet information not available     Current Diet Order:  ADULT DIET; Dysphagia - Soft and Bite Sized; 5 carb choices (75 gm/meal)    PROCEDURE:  Consistencies Administered During the Evaluation   Liquids: thin liquid   Solids:  pureed foods and soft solid foods      Method of Intake:   cup, straw, spoon  Fed by clinician      Position:   Seated, reclined -- pt in 4 point restraints     CLINICAL ASSESSMENT:  Oral Stage:       Decreased mastication due to:  cognitive function  Delayed A-P transit due to: cognitive function       Pharyngeal Stage:    No signs of aspiration were noted during this evaluation however, silent aspiration cannot be ruled out at bedside. If silent aspiration is suspected, a Videofluoroscopic Study of Swallowing (MBS) is recommended and requires a physician order. Cognition:   Follows 1 - step directions appropriate for this assessment, Confusion noted, Attention impaired, , Language impaired, , and Needs frequent verbal cues to maintain adequate alertness    Oral Peripheral Examination   Could not test    Current Respiratory Status    room air     Parameters of Speech Production  Respiration:  Adequate for speech production  Quality:   Could not test  Intensity: Could not test    Volitional Swallow: CNT     Volitional Cough:   CNT     Pain: No pain reported. EDUCATION:   The Speech Language Pathologist (SLP) completed education regarding results of evaluation and that intervention is warranted at this time.   Learner: Patient  Education: Reviewed results and recommendations of this evaluation  Evaluation of Education:  No evidence of learning and Family not present    This plan may be re-evaluated and revised as warranted. Evaluation Time includes thorough review of current medical information, gathering information on past medical history/social history and prior level of function, completion of standardized testing/informal observation of tasks, assessment of data and education on plan of care and goals. [x]The admitting diagnosis and active problem list, have been reviewed prior to initiation of this evaluation. ACTIVE PROBLEM LIST:   Patient Active Problem List   Diagnosis    Atrial fibrillation (HCC)    SVT (supraventricular tachycardia) (HCC)    Viral cardiomyopathy (HCC)    Acute respiratory failure ([facility]    Accidental drug overdose    Polysubstance abuse ([facility]    Noncompliance    HTN (hypertension)    Cocaine abuse ([facility]    Narcotic abuse ([facility]    Marijuana abuse    [facility] coma scale total score 3-8 ([facility]    Alcoholism ([facility]    Alcohol abuse    Alcohol dependence ([facility]    Iron deficiency anemia    DTs (delirium tremens) ([facility]         CPT code:  94356  bedside swallow gorge Bolaños.  Koby Loya M.A.. [id number]

## 2023-02-25 NOTE — CONSULTS
Behavioral health consult    Reason for consult: Agitated, concern for underlying psych issue, pink slipped    Consulted by:Moy Causey MD    Chief complaint: \"I have to go the bathroom. \"    HPI: Patient is a 68-year-old male with a past medical history of polysubstance abuse, atrial fibrillation, hypertension presented to the ED unresponsive after being found lying behind the soup kitchen. His urine drug screen is positive for cannabis, cocaine, fentanyl his blood alcohol level is less than 10 patient received 10 mg of Narcan he required intubation for airway protection he was admitted to the ICU for further medical management and treatment of acute respiratory failure, metabolic encephalopathy, alcohol abuse with withdrawal.  While patient was being treated in the ICU patient was requesting to leave AMA was found not to be able to walk properly was found \" incapacitated to make a decision\" apparently they were unable to force him to stay until the patient was pink slipped so patient was pink slipped due to his medical condition of confusion and not making safe decisions. Psychiatry was therefore consulted for \"agitated, concern for underlying psych issue, pink slip. \"  I saw the patient this evening along with nurse practitioner Mami Myrick. Patient is clearly delirious. He is having some psychomotor agitation he is continuously trying to get out of bed despite being restrained he is grabbing out the side rails grabbing at the air. He is able to tell us that he is in a hospital he is roughly able to tell us the date he believes is February 16, 2023 and he knows his name. He is not able to tell me why he is in the hospital or what he is being treated for. His speech is very garbled he is difficult to understand he seems confused he seems to be waxing and waning and going through a phase of delirium at this time.   Patient is a very poor historian were unable to accurately assess for any underlying psych issues while patient is going through delirium however review of the chart indicates the patient has a significant history of overdoses with polysubstance abuse there is no past history of any inpatient psychiatric hospitalizations. He denies any suicidal ideations intent or plan he denies ever attempting suicide in the past he denies any auditory or visual hallucinations he did not appear to be internally preoccupied or internally stimulated however he did appear to be confused and appears delirious. Unsure of the accuracy of the information being provided this patient is in a delirious state    Past psychiatric history: Patient denies any history of any inpatient or outpatient psychiatric treatment he he denies taking any psychotropic medication denies ever attempting suicide    Substance use history: Patient significant history of polysubstance abuse and overdoses. Urine drug screen was positive for cannabis cocaine and fentanyl patient also history of alcohol dependence has been going through alcohol withdrawal and has had a CIWA as high as 12 patient does seem to be going through alcohol withdrawal at the time of assessment he appears tremulous he is slurring his speech he appears confused    Personal family social history: Patient states he lives alone in a home in [place] [facility]. He states he is employed as unable to understand what his occupation as    Mental status examination  Psychomotor evaluation revealed no agitation retardation any abnormal movements. His eye contact is poor his speech is slurred and garbled difficult to understand. Patient is unable to answer questions regarding his mood affect is mood is anxious. His thought process is confused. Thought contents devoid of any auditory visual hallucinations delusions or any other perceptual abnormalities.   He is not able to repeat words or phrases he does not have knowledge of current or past events he denies suicidal homicidal ideations intent or plan impulse control is poor cognitive function appears below his baseline his insight judgment is  poor he is roughly oriented to time place and person    Clinical impression:  Delirium  Polysubstance abuse    Plans and recommendations:  Discussed with Dr. Vamsi Marquis. Patient is not suicidal, homicidal, psychotic or manic he does not meet criteria for inpatient level of psychiatric treatment. Patient is clearly going through phase of delirium likely secondary to alcohol withdrawal.  Patient is likely also going through opiate withdrawal which would also leading to some increased agitation and irritability. Patient is currently under a pink slip written for medical purpose due to patient's confusion and being \" incapacitated to make a decision. \"  I would recommend the medical team discuss with administration the policies of patients who lack capacity requesting to sign out AMA. Per the chart the medical team has already decided the patient lacks capacity to make decisions. Once patient is out of the delirium phase, no longer confused would recommend consulting peer support and also the addiction specialist Dr. Suraj Bennett. Would recommend continuing alcohol withdrawal treatment per the primary medical team as patient does still seem to be going through withdrawals he appears tremulous he is confused. Psychiatry will defer from starting Depakote at this time due to patient's transaminitis would recommend frequent and early ambulation when able to do so and also recommend minimizing use of restraints whenever possible to help clear delirium. Patient can be set up with an outpatient community mental health agency for psychiatric evaluation after he is medically stabilized his mentation is clear he is out of the phase of delirium to further assess for any underlying psych issues.   At this time psychiatry will sign off please reconsult if there are any acute psychiatric needs or concerns

## 2023-02-25 NOTE — PLAN OF CARE
Pt is in bilateral wrist restraints and bilateral ankle restraints. He is agitated and unable to be redirected. Tries to pull at lines and tubing. Restraints continued for patients safety.        Problem: Pain  Goal: Verbalizes/displays adequate comfort level or baseline comfort level  2/24/2023 2143 by Bianca Farias RN  Outcome: Progressing  2/24/2023 1240 by Marley Skaggs RN  Outcome: Progressing     Problem: Safety - Adult  Goal: Free from fall injury  2/24/2023 2143 by Bianca Farias RN  Outcome: Progressing  2/24/2023 1240 by Marley Skaggs RN  Outcome: Progressing     Problem: Neurosensory - Adult  Goal: Achieves stable or improved neurological status  2/24/2023 2143 by Bianca Farias RN  Outcome: Progressing  2/24/2023 1240 by Marley Skaggs RN  Outcome: Progressing  Goal: Achieves maximal functionality and self care  2/24/2023 1240 by Marley Skaggs RN  Outcome: Progressing     Problem: Respiratory - Adult  Goal: Achieves optimal ventilation and oxygenation  2/24/2023 2143 by Bianca Farias RN  Outcome: Progressing  2/24/2023 1240 by Marley Skaggs RN  Outcome: Progressing     Problem: Cardiovascular - Adult  Goal: Maintains optimal cardiac output and hemodynamic stability  2/24/2023 2143 by Bianca Farias RN  Outcome: Progressing  2/24/2023 1240 by Marley Skaggs RN  Outcome: Progressing  Goal: Absence of cardiac dysrhythmias or at baseline  2/24/2023 1240 by Marley Skaggs RN  Outcome: Progressing     Problem: Musculoskeletal - Adult  Goal: Return mobility to safest level of function  2/24/2023 1240 by Marley Skaggs RN  Outcome: Progressing  Goal: Maintain proper alignment of affected body part  2/24/2023 1240 by Marley Skaggs RN  Outcome: Progressing  Goal: Return ADL status to a safe level of function  2/24/2023 1240 by Marley Skaggs RN  Outcome: Progressing     Problem: Gastrointestinal - Adult  Goal: Minimal or absence of nausea and vomiting  2/24/2023 1240 by Harish Dickerson Evert Tenorio RN  Outcome: Progressing  Goal: Maintains or returns to baseline bowel function  2/24/2023 1240 by Liban Montez RN  Outcome: Progressing  Goal: Maintains adequate nutritional intake  2/24/2023 1240 by Liban Montez RN  Outcome: Progressing     Problem: Genitourinary - Adult  Goal: Absence of urinary retention  2/24/2023 1240 by Liban Montez RN  Outcome: Progressing  Goal: Urinary catheter remains patent  2/24/2023 1240 by Liban Montez RN  Outcome: Progressing     Problem: ABCDS Injury Assessment  Goal: Absence of physical injury  2/24/2023 1240 by Liban Montez RN  Outcome: Progressing     Problem: Skin/Tissue Integrity  Goal: Absence of new skin breakdown  Description: 1. Monitor for areas of redness and/or skin breakdown  2. Assess vascular access sites hourly  3. Every 4-6 hours minimum:  Change oxygen saturation probe site  4. Every 4-6 hours:  If on nasal continuous positive airway pressure, respiratory therapy assess nares and determine need for appliance change or resting period. 2/24/2023 1240 by Liban Montez RN  Outcome: Progressing     Problem: Safety - Medical Restraint  Goal: Remains free of injury from restraints (Restraint for Interference with Medical Device)  Description: INTERVENTIONS:  1. Determine that other, less restrictive measures have been tried or would not be effective before applying the restraint  2. Evaluate the patient's condition at the time of restraint application  3. Inform patient/family regarding the reason for restraint  4.  Q2H: Monitor safety, psychosocial status, comfort, nutrition and hydration  2/24/2023 2143 by Julieta Cruz RN  Outcome: Progressing  Flowsheets  Taken 2/24/2023 2000 by Julieta Cruz RN  Remains free of injury from restraints (restraint for interference with medical device): Every 2 hours: Monitor safety, psychosocial status, comfort, nutrition and hydration  Taken 2/24/2023 1800 by Liban Montez RN  Remains free of injury from restraints (restraint for interference with medical device): Every 2 hours: Monitor safety, psychosocial status, comfort, nutrition and hydration  Taken 2/24/2023 1600 by Tigist Banks RN  Remains free of injury from restraints (restraint for interference with medical device): Every 2 hours: Monitor safety, psychosocial status, comfort, nutrition and hydration  Taken 2/24/2023 1400 by Tigist Banks RN  Remains free of injury from restraints (restraint for interference with medical device): Every 2 hours: Monitor safety, psychosocial status, comfort, nutrition and hydration  2/24/2023 1240 by Tigist Banks RN  Outcome: Progressing  Flowsheets (Taken 2/24/2023 1213)  Remains free of injury from restraints (restraint for interference with medical device): Every 2 hours: Monitor safety, psychosocial status, comfort, nutrition and hydration

## 2023-02-25 NOTE — PLAN OF CARE
Problem: Safety - Medical Restraint  Goal: Remains free of injury from restraints (Restraint for Interference with Medical Device)  Description: INTERVENTIONS:  1. Determine that other, less restrictive measures have been tried or would not be effective before applying the restraint  2. Evaluate the patient's condition at the time of restraint application  3. Inform patient/family regarding the reason for restraint  4. Q2H: Monitor safety, psychosocial status, comfort, nutrition and hydration  Recent Flowsheet Documentation  Taken 2/25/2023 1200 by Blank Ruiz RN  Remains free of injury from restraints (restraint for interference with medical device): Determine that other, less restrictive measures have been tried or would not be effective before applying the restraint  Taken 2/25/2023 1000 by Blank Ruiz RN  Remains free of injury from restraints (restraint for interference with medical device): Determine that other, less restrictive measures have been tried or would not be effective before applying the restraint  Taken 2/24/2023 2352 by Vishal Mcclellan RN  Remains free of injury from restraints (restraint for interference with medical device):   Determine that other, less restrictive measures have been tried or would not be effective before applying the restraint   Inform patient/family regarding the reason for restraint   Evaluate the patient's condition at the time of restraint application   Every 2 hours: Monitor safety, psychosocial status, comfort, nutrition and hydration     Problem: Safety - Medical Restraint  Goal: Remains free of injury from restraints (Restraint for Interference with Medical Device)  Description: INTERVENTIONS:  1. Determine that other, less restrictive measures have been tried or would not be effective before applying the restraint  2. Evaluate the patient's condition at the time of restraint application  3. Inform patient/family regarding the reason for restraint  4. Q2H: Monitor safety, psychosocial status, comfort, nutrition and hydration  2/25/2023 1327 by Robert Harrison RN  Outcome: Progressing  Flowsheets  Taken 2/25/2023 1200  Remains free of injury from restraints (restraint for interference with medical device): Determine that other, less restrictive measures have been tried or would not be effective before applying the restraint  Taken 2/25/2023 1000  Remains free of injury from restraints (restraint for interference with medical device): Determine that other, less restrictive measures have been tried or would not be effective before applying the restraint

## 2023-02-25 NOTE — PROGRESS NOTES
Patient continues to yell out for anyone to let him out so he can go to work. 1:1 given but patient continues to tell this nurse \"to get the fuck out out of here you fucking cunt\" 1:1 given on appropriate language use and patient refused education. He also states \" I will punch you in the face\". 1:1 given on safety of staff and of the patient and patient also refuses education. Patient is not willing to be redirected in the participation of his well being. There have been multiple attempts to redirect and re-educate patient about patient treatment plan. Will continue to redirect and educate patient with plan of care and education.

## 2023-02-25 NOTE — PROGRESS NOTES
Pt seemed to be more agitated and restless despite being given max amount of Ativan. Dr. Bryson Agrawal notified and placed one time order for Geodon.  Will continue plan of care

## 2023-02-25 NOTE — PROGRESS NOTES
Patient straight cathed for Bladder scan of 514, 600 ml of dark odessa urine returned.  UA C/S specimens sent    Miya Bahena RN  02/25/23  12:21 PM

## 2023-02-25 NOTE — PROGRESS NOTES
Pt less anxious and much more relaxed after being given Geodon. Will hold off on Ativan and continue to assess based on CIWA scale.

## 2023-02-25 NOTE — PLAN OF CARE
Problem: Safety - Medical Restraint  Goal: Remains free of injury from restraints (Restraint for Interference with Medical Device)  Description: INTERVENTIONS:  1. Determine that other, less restrictive measures have been tried or would not be effective before applying the restraint  2. Evaluate the patient's condition at the time of restraint application  3. Inform patient/family regarding the reason for restraint  4. Q2H: Monitor safety, psychosocial status, comfort, nutrition and hydration  Recent Flowsheet Documentation  Taken 2/24/2023 2352 by Juli Wang RN  Remains free of injury from restraints (restraint for interference with medical device):   Determine that other, less restrictive measures have been tried or would not be effective before applying the restraint   Inform patient/family regarding the reason for restraint   Evaluate the patient's condition at the time of restraint application   Every 2 hours: Monitor safety, psychosocial status, comfort, nutrition and hydration  Taken 2/24/2023 2200 by Lilo Nelson RN  Remains free of injury from restraints (restraint for interference with medical device): Every 2 hours: Monitor safety, psychosocial status, comfort, nutrition and hydration  Taken 2/24/2023 2000 by Lilo Nelson RN  Remains free of injury from restraints (restraint for interference with medical device): Every 2 hours: Monitor safety, psychosocial status, comfort, nutrition and hydration  Taken 2/24/2023 1800 by Mer Cerna RN  Remains free of injury from restraints (restraint for interference with medical device): Every 2 hours: Monitor safety, psychosocial status, comfort, nutrition and hydration  Taken 2/24/2023 1600 by Mer Cerna RN  Remains free of injury from restraints (restraint for interference with medical device): Every 2 hours: Monitor safety, psychosocial status, comfort, nutrition and hydration     Problem: Safety - Medical Restraint  Goal: Remains free  of injury from restraints (Restraint for Interference with Medical Device)  Description: INTERVENTIONS:  1. Determine that other, less restrictive measures have been tried or would not be effective before applying the restraint  2. Evaluate the patient's condition at the time of restraint application  3. Inform patient/family regarding the reason for restraint  4.  Q2H: Monitor safety, psychosocial status, comfort, nutrition and hydration  2/25/2023 0421 by Rhina Bains RN  Outcome: Progressing  2/25/2023 0040 by Rhina Bains RN  Outcome: Progressing  Flowsheets  Taken 2/24/2023 2352 by Rhina Bains RN  Remains free of injury from restraints (restraint for interference with medical device):   Determine that other, less restrictive measures have been tried or would not be effective before applying the restraint   Inform patient/family regarding the reason for restraint   Evaluate the patient's condition at the time of restraint application   Every 2 hours: Monitor safety, psychosocial status, comfort, nutrition and hydration  Taken 2/24/2023 2200 by Bianca Farias RN  Remains free of injury from restraints (restraint for interference with medical device): Every 2 hours: Monitor safety, psychosocial status, comfort, nutrition and hydration  2/24/2023 2143 by Bianca Farias RN  Outcome: Progressing  Flowsheets  Taken 2/24/2023 2000 by Bianca Farias RN  Remains free of injury from restraints (restraint for interference with medical device): Every 2 hours: Monitor safety, psychosocial status, comfort, nutrition and hydration  Taken 2/24/2023 1800 by Marley Skaggs RN  Remains free of injury from restraints (restraint for interference with medical device): Every 2 hours: Monitor safety, psychosocial status, comfort, nutrition and hydration  Taken 2/24/2023 1600 by Marley Skaggs RN  Remains free of injury from restraints (restraint for interference with medical device): Every 2 hours: Monitor safety, psychosocial status, comfort, nutrition and hydration  Taken 2/24/2023 1400 by Oneil Busby RN  Remains free of injury from restraints (restraint for interference with medical device): Every 2 hours: Monitor safety, psychosocial status, comfort, nutrition and hydration     Problem: Pain  Goal: Verbalizes/displays adequate comfort level or baseline comfort level  2/25/2023 0421 by Isaac Jade RN  Outcome: Progressing  2/24/2023 2143 by Mary Dover RN  Outcome: Progressing

## 2023-02-26 ENCOUNTER — APPOINTMENT (OUTPATIENT)
Dept: GENERAL RADIOLOGY | Age: 53
DRG: 917 | End: 2023-02-26
Payer: MEDICAID

## 2023-02-26 LAB
AADO2: 205.1 MMHG
ADENOVIRUS BY PCR: NOT DETECTED
ALBUMIN SERPL-MCNC: 3.4 G/DL (ref 3.5–5.2)
ALP BLD-CCNC: 46 U/L (ref 40–129)
ALT SERPL-CCNC: 109 U/L (ref 0–40)
ANION GAP SERPL CALCULATED.3IONS-SCNC: 13 MMOL/L (ref 7–16)
AST SERPL-CCNC: 104 U/L (ref 0–39)
B.E.: -0.5 MMOL/L (ref -3–3)
B.E.: -1.2 MMOL/L (ref -3–3)
BASOPHILS ABSOLUTE: 0.04 E9/L (ref 0–0.2)
BASOPHILS RELATIVE PERCENT: 0.5 % (ref 0–2)
BILIRUB SERPL-MCNC: 0.8 MG/DL (ref 0–1.2)
BLOOD CULTURE, ROUTINE: NORMAL
BORDETELLA PARAPERTUSSIS BY PCR: NOT DETECTED
BORDETELLA PERTUSSIS BY PCR: NOT DETECTED
BUN BLDV-MCNC: 11 MG/DL (ref 6–20)
CALCIUM SERPL-MCNC: 8.6 MG/DL (ref 8.6–10.2)
CHLAMYDOPHILIA PNEUMONIAE BY PCR: NOT DETECTED
CHLORIDE BLD-SCNC: 106 MMOL/L (ref 98–107)
CO2: 22 MMOL/L (ref 22–29)
COHB: 1.2 % (ref 0–1.5)
COHB: 1.4 % (ref 0–1.5)
CORONAVIRUS 229E BY PCR: NOT DETECTED
CORONAVIRUS HKU1 BY PCR: NOT DETECTED
CORONAVIRUS NL63 BY PCR: NOT DETECTED
CORONAVIRUS OC43 BY PCR: NOT DETECTED
CREAT SERPL-MCNC: 0.7 MG/DL (ref 0.7–1.2)
CRITICAL: ABNORMAL
CRITICAL: ABNORMAL
CULTURE, BLOOD 2: NORMAL
DATE ANALYZED: ABNORMAL
DATE ANALYZED: ABNORMAL
DATE OF COLLECTION: ABNORMAL
DATE OF COLLECTION: ABNORMAL
EOSINOPHILS ABSOLUTE: 0.09 E9/L (ref 0.05–0.5)
EOSINOPHILS RELATIVE PERCENT: 1 % (ref 0–6)
FIO2: 60 %
GFR SERPL CREATININE-BSD FRML MDRD: >60 ML/MIN/1.73
GLUCOSE BLD-MCNC: 124 MG/DL (ref 74–99)
HCO3: 22.3 MMOL/L (ref 22–26)
HCO3: 26.1 MMOL/L (ref 22–26)
HCT VFR BLD CALC: 41.7 % (ref 37–54)
HEMOGLOBIN: 15.1 G/DL (ref 12.5–16.5)
HHB: 1.1 % (ref 0–5)
HHB: 11.4 % (ref 0–5)
HUMAN METAPNEUMOVIRUS BY PCR: NOT DETECTED
HUMAN RHINOVIRUS/ENTEROVIRUS BY PCR: NOT DETECTED
IMMATURE GRANULOCYTES #: 0.06 E9/L
IMMATURE GRANULOCYTES %: 0.7 % (ref 0–5)
INFLUENZA A BY PCR: NOT DETECTED
INFLUENZA B BY PCR: NOT DETECTED
LAB: ABNORMAL
LAB: ABNORMAL
LYMPHOCYTES ABSOLUTE: 0.93 E9/L (ref 1.5–4)
LYMPHOCYTES RELATIVE PERCENT: 10.6 % (ref 20–42)
Lab: ABNORMAL
Lab: ABNORMAL
MAGNESIUM: 1.8 MG/DL (ref 1.6–2.6)
MCH RBC QN AUTO: 32.3 PG (ref 26–35)
MCHC RBC AUTO-ENTMCNC: 36.2 % (ref 32–34.5)
MCV RBC AUTO: 89.1 FL (ref 80–99.9)
METHB: 0.3 % (ref 0–1.5)
METHB: 0.4 % (ref 0–1.5)
MODE: ABNORMAL
MODE: ABNORMAL
MONOCYTES ABSOLUTE: 1.11 E9/L (ref 0.1–0.95)
MONOCYTES RELATIVE PERCENT: 12.6 % (ref 2–12)
MYCOPLASMA PNEUMONIAE BY PCR: NOT DETECTED
NEUTROPHILS ABSOLUTE: 6.55 E9/L (ref 1.8–7.3)
NEUTROPHILS RELATIVE PERCENT: 74.6 % (ref 43–80)
O2 CONTENT: 20.1 ML/DL
O2 CONTENT: 21 ML/DL
O2 SATURATION: 88.4 % (ref 92–98.5)
O2 SATURATION: 98.9 % (ref 92–98.5)
O2HB: 86.8 % (ref 94–97)
O2HB: 97.4 % (ref 94–97)
OPERATOR ID: 1768
OPERATOR ID: 8214
PARAINFLUENZA VIRUS 1 BY PCR: NOT DETECTED
PARAINFLUENZA VIRUS 2 BY PCR: NOT DETECTED
PARAINFLUENZA VIRUS 3 BY PCR: NOT DETECTED
PARAINFLUENZA VIRUS 4 BY PCR: NOT DETECTED
PATIENT TEMP: 37 C
PATIENT TEMP: 37 C
PCO2: 34.1 MMHG (ref 35–45)
PCO2: 49.9 MMHG (ref 35–45)
PDW BLD-RTO: 13.8 FL (ref 11.5–15)
PEEP/CPAP: 6 CMH2O
PFO2: 2.84 MMHG/%
PH BLOOD GAS: 7.34 (ref 7.35–7.45)
PH BLOOD GAS: 7.43 (ref 7.35–7.45)
PHOSPHORUS: 3 MG/DL (ref 2.5–4.5)
PLATELET # BLD: 239 E9/L (ref 130–450)
PMV BLD AUTO: 9.3 FL (ref 7–12)
PO2: 170.2 MMHG (ref 75–100)
PO2: 57.9 MMHG (ref 75–100)
POTASSIUM SERPL-SCNC: 4.3 MMOL/L (ref 3.5–5)
RBC # BLD: 4.68 E12/L (ref 3.8–5.8)
RESPIRATORY SYNCYTIAL VIRUS BY PCR: NOT DETECTED
RI(T): 1.21
RR MECHANICAL: 16 B/MIN
SARS-COV-2, PCR: NOT DETECTED
SODIUM BLD-SCNC: 141 MMOL/L (ref 132–146)
SOURCE, BLOOD GAS: ABNORMAL
SOURCE, BLOOD GAS: ABNORMAL
THB: 15.1 G/DL (ref 11.5–16.5)
THB: 16.5 G/DL (ref 11.5–16.5)
TIME ANALYZED: 805
TIME ANALYZED: 909
TOTAL PROTEIN: 6.5 G/DL (ref 6.4–8.3)
VT MECHANICAL: 500 ML
WBC # BLD: 8.8 E9/L (ref 4.5–11.5)

## 2023-02-26 PROCEDURE — 71045 X-RAY EXAM CHEST 1 VIEW: CPT

## 2023-02-26 PROCEDURE — 6360000002 HC RX W HCPCS: Performed by: INTERNAL MEDICINE

## 2023-02-26 PROCEDURE — 6370000000 HC RX 637 (ALT 250 FOR IP): Performed by: INTERNAL MEDICINE

## 2023-02-26 PROCEDURE — 2580000003 HC RX 258: Performed by: INTERNAL MEDICINE

## 2023-02-26 PROCEDURE — 51798 US URINE CAPACITY MEASURE: CPT

## 2023-02-26 PROCEDURE — 82805 BLOOD GASES W/O2 SATURATION: CPT

## 2023-02-26 PROCEDURE — 80053 COMPREHEN METABOLIC PANEL: CPT

## 2023-02-26 PROCEDURE — 85025 COMPLETE CBC W/AUTO DIFF WBC: CPT

## 2023-02-26 PROCEDURE — 94660 CPAP INITIATION&MGMT: CPT

## 2023-02-26 PROCEDURE — 2500000003 HC RX 250 WO HCPCS: Performed by: INTERNAL MEDICINE

## 2023-02-26 PROCEDURE — 84100 ASSAY OF PHOSPHORUS: CPT

## 2023-02-26 PROCEDURE — 99291 CRITICAL CARE FIRST HOUR: CPT | Performed by: INTERNAL MEDICINE

## 2023-02-26 PROCEDURE — 0202U NFCT DS 22 TRGT SARS-COV-2: CPT

## 2023-02-26 PROCEDURE — 2000000000 HC ICU R&B

## 2023-02-26 PROCEDURE — 51701 INSERT BLADDER CATHETER: CPT

## 2023-02-26 PROCEDURE — 2700000000 HC OXYGEN THERAPY PER DAY

## 2023-02-26 PROCEDURE — 83735 ASSAY OF MAGNESIUM: CPT

## 2023-02-26 RX ORDER — MAGNESIUM SULFATE IN WATER 40 MG/ML
2000 INJECTION, SOLUTION INTRAVENOUS ONCE
Status: COMPLETED | OUTPATIENT
Start: 2023-02-26 | End: 2023-02-26

## 2023-02-26 RX ORDER — 0.9 % SODIUM CHLORIDE 0.9 %
1000 INTRAVENOUS SOLUTION INTRAVENOUS ONCE
Status: COMPLETED | OUTPATIENT
Start: 2023-02-26 | End: 2023-02-26

## 2023-02-26 RX ADMIN — PHENOBARBITAL SODIUM 32.5 MG: 65 INJECTION INTRAMUSCULAR at 06:07

## 2023-02-26 RX ADMIN — MAGNESIUM SULFATE HEPTAHYDRATE 2000 MG: 40 INJECTION, SOLUTION INTRAVENOUS at 09:32

## 2023-02-26 RX ADMIN — DEXMEDETOMIDINE 0.2 MCG/KG/HR: 100 INJECTION, SOLUTION INTRAVENOUS at 18:57

## 2023-02-26 RX ADMIN — THIAMINE HYDROCHLORIDE 250 MG: 100 INJECTION, SOLUTION INTRAMUSCULAR; INTRAVENOUS at 20:09

## 2023-02-26 RX ADMIN — PANTOPRAZOLE SODIUM 40 MG: 40 TABLET, DELAYED RELEASE ORAL at 06:08

## 2023-02-26 RX ADMIN — SODIUM CHLORIDE, PRESERVATIVE FREE 10 ML: 5 INJECTION INTRAVENOUS at 20:09

## 2023-02-26 RX ADMIN — DEXTROSE AND SODIUM CHLORIDE: 5; 450 INJECTION, SOLUTION INTRAVENOUS at 18:57

## 2023-02-26 RX ADMIN — SODIUM CHLORIDE 1000 ML: 9 INJECTION, SOLUTION INTRAVENOUS at 08:55

## 2023-02-26 RX ADMIN — LORAZEPAM 1 MG: 2 INJECTION INTRAMUSCULAR; INTRAVENOUS at 04:16

## 2023-02-26 RX ADMIN — ENOXAPARIN SODIUM 40 MG: 100 INJECTION SUBCUTANEOUS at 09:39

## 2023-02-26 RX ADMIN — LORAZEPAM 2 MG: 2 INJECTION INTRAMUSCULAR; INTRAVENOUS at 07:09

## 2023-02-26 ASSESSMENT — PAIN SCALES - GENERAL
PAINLEVEL_OUTOF10: 0

## 2023-02-26 NOTE — PLAN OF CARE
Problem: Neurosensory - Adult  Goal: Achieves stable or improved neurological status  2/26/2023 1014 by Mac Bain RN  Outcome: Not Progressing  2/26/2023 0013 by Leda Nguyễn RN  Outcome: Progressing  Flowsheets (Taken 2/26/2023 0000)  Achieves stable or improved neurological status: Assess for and report changes in neurological status  2/25/2023 2222 by Leda Nguyễn RN  Outcome: Progressing  [address] (Taken 2/25/2023 2000)  Achieves stable or improved neurological status: Assess for and report changes in neurological status     Problem: Respiratory - Adult  Goal: Achieves optimal ventilation and oxygenation  2/26/2023 1014 by Mac Bain RN  Outcome: Not Progressing  2/26/2023 0013 by Leda Nguyễn RN  Outcome: Progressing  Flowsheets (Taken 2/26/2023 0000)  Achieves optimal ventilation and oxygenation: Assess for changes in respiratory status  2/25/2023 2222 by Leda Nguyễn RN  Outcome: Progressing  Flowsheets (Taken 2/25/2023 2000)  Achieves optimal ventilation and oxygenation: Assess for changes in respiratory status

## 2023-02-26 NOTE — PROGRESS NOTES
Upon entering room, pt appeared to be in respiratory distress with labored breathing, Pt is not responding to voice or following commands. RR 30, SPO2 89% on 4L NC.  Obtained ABG, notified resident and placed patient on % FiO2

## 2023-02-26 NOTE — PLAN OF CARE
Problem: Safety - Medical Restraint  Goal: Remains free of injury from restraints (Restraint for Interference with Medical Device)  Description: INTERVENTIONS:  1. Determine that other, less restrictive measures have been tried or would not be effective before applying the restraint  2. Evaluate the patient's condition at the time of restraint application  3. Inform patient/family regarding the reason for restraint  4.  Q2H: Monitor safety, psychosocial status, comfort, nutrition and hydration  2/25/2023 2222 by Lorrie Gamez RN  Outcome: Progressing  Flowsheets  Taken 2/25/2023 2000 by Lorrie Gamez RN  Remains free of injury from restraints (restraint for interference with medical device): Determine that other, less restrictive measures have been tried or would not be effective before applying the restraint  Taken 2/25/2023 1909 by Lorrie Gamez RN  Remains free of injury from restraints (restraint for interference with medical device): Determine that other, less restrictive measures have been tried or would not be effective before applying the restraint  Taken 2/25/2023 1400 by Rachel Duke RN  Remains free of injury from restraints (restraint for interference with medical device): Determine that other, less restrictive measures have been tried or would not be effective before applying the restraint     Problem: Pain  Goal: Verbalizes/displays adequate comfort level or baseline comfort level  Outcome: Progressing  Flowsheets (Taken 2/25/2023 2000)  Verbalizes/displays adequate comfort level or baseline comfort level: Encourage patient to monitor pain and request assistance     Problem: Safety - Adult  Goal: Free from fall injury  Outcome: Progressing  Flowsheets  Taken 2/25/2023 2000 by Lorrie Gamez RN  Free From Fall Injury: Instruct family/caregiver on patient safety  Taken 2/25/2023 1252 by Rachel Duke RN  Free From Fall Injury: Instruct family/caregiver on patient safety     Problem: Neurosensory - Adult  Goal: Achieves stable or improved neurological status  Outcome: Progressing  Flowsheets (Taken 2/25/2023 2000)  Achieves stable or improved neurological status: Assess for and report changes in neurological status     Problem: Neurosensory - Adult  Goal: Achieves maximal functionality and self care  Outcome: Progressing  Flowsheets (Taken 2/25/2023 2000)  Achieves maximal functionality and self care: Monitor swallowing and airway patency with patient fatigue and changes in neurological status     Problem: Respiratory - Adult  Goal: Achieves optimal ventilation and oxygenation  Outcome: Progressing  Flowsheets (Taken 2/25/2023 2000)  Achieves optimal ventilation and oxygenation: Assess for changes in respiratory status     Problem: Cardiovascular - Adult  Goal: Maintains optimal cardiac output and hemodynamic stability  Outcome: Progressing  Flowsheets (Taken 2/25/2023 2000)  Maintains optimal cardiac output and hemodynamic stability: Monitor blood pressure and heart rate     Problem: Cardiovascular - Adult  Goal: Absence of cardiac dysrhythmias or at baseline  Outcome: Progressing  Flowsheets (Taken 2/25/2023 2000)  Absence of cardiac dysrhythmias or at baseline: Monitor cardiac rate and rhythm     Problem: Musculoskeletal - Adult  Goal: Return mobility to safest level of function  Outcome: Progressing  Flowsheets (Taken 2/25/2023 2000)  Return Mobility to Safest Level of Function: Assess patient stability and activity tolerance for standing, transferring and ambulating with or without assistive devices     Problem: Musculoskeletal - Adult  Goal: Maintain proper alignment of affected body part  Outcome: Progressing  Flowsheets (Taken 2/25/2023 2000)  Maintain proper alignment of affected body part: Support and protect limb and body alignment per provider's orders     Problem: Musculoskeletal - Adult  Goal: Return ADL status to a safe level of function  Outcome: Progressing  Flowsheets (Taken 2/25/2023 2000)  Return ADL Status to a Safe Level of Function: Administer medication as ordered     Problem: Gastrointestinal - Adult  Goal: Minimal or absence of nausea and vomiting  Outcome: Progressing  Flowsheets (Taken 2/25/2023 2000)  Minimal or absence of nausea and vomiting: Administer IV fluids as ordered to ensure adequate hydration     Problem: Gastrointestinal - Adult  Goal: Maintains or returns to baseline bowel function  Outcome: Progressing  Flowsheets (Taken 2/25/2023 2000)  Maintains or returns to baseline bowel function: Assess bowel function     Problem: Gastrointestinal - Adult  Goal: Maintains adequate nutritional intake  Outcome: Progressing  Flowsheets (Taken 2/25/2023 2000)  Maintains adequate nutritional intake: Monitor intake and output, weight and lab values     Problem: Genitourinary - Adult  Goal: Absence of urinary retention  Outcome: Progressing  Flowsheets (Taken 2/25/2023 2000)  Absence of urinary retention: Assess patient’s ability to void and empty bladder     Problem: Genitourinary - Adult  Goal: Urinary catheter remains patent  Outcome: Progressing  Flowsheets (Taken 2/25/2023 2000)  Urinary catheter remains patent: Assess patency of urinary catheter     Problem: ABCDS Injury Assessment  Goal: Absence of physical injury  Outcome: Progressing  Flowsheets  Taken 2/25/2023 2000 by Marita Cooper RN  Absence of Physical Injury: Implement safety measures based on patient assessment  Taken 2/25/2023 1252 by Angie Alegria RN  Absence of Physical Injury: Implement safety measures based on patient assessment     Problem: Skin/Tissue Integrity  Goal: Absence of new skin breakdown  Description: 1.  Monitor for areas of redness and/or skin breakdown  2.  Assess vascular access sites hourly  3.  Every 4-6 hours minimum:  Change oxygen saturation probe site  4.  Every 4-6 hours:  If on nasal continuous positive airway pressure, respiratory therapy  assess nares and determine need for appliance change or resting period.   Outcome: Progressing

## 2023-02-26 NOTE — PROGRESS NOTES
[address] [facility]  Department of Internal Medicine   Internal Medicine Residency   MICU Progress Note    Patient:  Anahi Post 45 y.o. male  MRN: [id number]     Date of Service: 2/26/2023    Allergy: Red dye    Subjective     I have seen and examine the patient at bed side. Patient was somnolent in the morning, but following commands later  putting the patient on BIPAP. Objective     VS: /81   Pulse 67   Temp 99 °F (37.2 °C) (Axillary)   Resp 16   Ht 5' 10\" (1.778 m)   Wt 174 lb 4.8 oz (79.1 kg)   SpO2 100%   BMI 25.01 kg/m²           I & O - 24hr:   Intake/Output Summary (Last 24 hours) at 2/26/2023 1302  Last data filed at 2/26/2023 8899  Gross per 24 hour   Intake 2061.73 ml   Output 1250 ml   Net 811.73 ml       Physical Exam:  General Appearance: mild distress, slowed mentation, and following commands  Neck: no adenopathy, no carotid bruit, no JVD, supple, symmetrical, trachea midline, and thyroid not enlarged, symmetric, no tenderness/mass/nodules  Lung: clear to auscultation bilaterally  Heart: regular rate and rhythm, S1, S2 normal, no murmur, click, rub or gallop  Abdomen: soft, non-tender; bowel sounds normal; no masses,  no organomegaly  Extremities:  extremities normal, atraumatic, no cyanosis or edema  Musculoskeletal: No joint swelling, no muscle tenderness. ROM normal in all joints of extremities.    Neurologic: Mental status: somnolent, but is following commands    Lines     site day    Art line   None    TLC None    PICC None    Hemoaccess None    ABG:     Lab Results   Component Value Date/Time    PH 7.433 02/26/2023 09:09 AM    PCO2 34.1 02/26/2023 09:09 AM    PO2 170.2 02/26/2023 09:09 AM    HCO3 22.3 02/26/2023 09:09 AM    BE -1.2 02/26/2023 09:09 AM    THB 15.1 02/26/2023 09:09 AM    O2SAT 98.9 02/26/2023 09:09 AM        Medications       Labs   CBC with Differential:    Lab Results   Component Value Date/Time    WBC 8.8 02/26/2023 05:25 AM    RBC 4.68 02/26/2023 05:25 AM    HGB 15.1 02/26/2023 05:25 AM    HCT 41.7 02/26/2023 05:25 AM     02/26/2023 05:25 AM    MCV 89.1 02/26/2023 05:25 AM    MCH 32.3 02/26/2023 05:25 AM    MCHC 36.2 02/26/2023 05:25 AM    RDW 13.8 02/26/2023 05:25 AM    LYMPHOPCT 10.6 02/26/2023 05:25 AM    MONOPCT 12.6 02/26/2023 05:25 AM    BASOPCT 0.5 02/26/2023 05:25 AM    MONOSABS 1.11 02/26/2023 05:25 AM    LYMPHSABS 0.93 02/26/2023 05:25 AM    EOSABS 0.09 02/26/2023 05:25 AM    BASOSABS 0.04 02/26/2023 05:25 AM     CMP:    Lab Results   Component Value Date/Time     02/26/2023 05:25 AM    K 4.3 02/26/2023 05:25 AM     02/26/2023 05:25 AM    CO2 22 02/26/2023 05:25 AM    BUN 11 02/26/2023 05:25 AM    CREATININE 0.7 02/26/2023 05:25 AM    GFRAA >60 05/25/2018 07:32 AM    LABGLOM >60 02/26/2023 05:25 AM    GLUCOSE 124 02/26/2023 05:25 AM    PROT 6.5 02/26/2023 05:25 AM    LABALBU 3.4 02/26/2023 05:25 AM    CALCIUM 8.6 02/26/2023 05:25 AM    BILITOT 0.8 02/26/2023 05:25 AM    ALKPHOS 46 02/26/2023 05:25 AM     02/26/2023 05:25 AM     02/26/2023 05:25 AM         Resident's Assessment and Plan     Neurology:   Acute Encephalopathy likely 2/2 alcohol withdrawal  -CIWA score documented 27 on general medical floor  -Phenobarbital on hold as patient became somnolent and unresponsive in the morning. -CIWA protocol on hold, if needed can be resumed  -Monitor mentation    Cardiology:   History of paroxysmal A fibrillation  -Not on any mediation    Pulmonary:   Acute hypoxic failure likely patient was requiring more ATIVAN per CIWA protocol  -In the morning ABG showed CO2 of 55, after BIPAP, ABG improved, CXR normal.  -On BIPAP, off as tolerated    Gastroenterology:   Newly diagnosed hepatitis C on 2/22/23  -Follow viral load  -No previous Hep C testing in EMR    2.  Transaminitis in the setting of alcohol abuse and hepatitis C  -Follow daily CMP lab    Psych  History of polysubstance abuse  -Cocaine, Fentanyl, cannabinoid, and Ethanol  -On admission SDS was positive for ethanol, UDS positive for cocaine, Meth, marijuana.       PT/OT: Not required at this time  DVT ppx: Lovenox  GI ppx: Sophia Prajapati MD, PGY-1    Attending physician: Dr. Gaby Melton

## 2023-02-26 NOTE — CONSULTS
[facility]  Internal Medicine Residency Program  Consult Note  MICU    Patient:  Dara Knox 45 y.o. male MRN: [id number]     Date of Service: 2/25/2023    Hospital Day: 5      Chief complaint: agitation, elevated CIWA score, concern for withdrawal  History of Present Illness   The patient is a 45 y.o. male with past medical history of paroxysmal atrial fibrillation, polysubstance abuse (alcohol and opiates), and hypertension who presents to the [facility] ICU with from the general medical floor for concern of agitation, elevated CIWA score, and concern for withdrawal. The patient was transferred out of the ICU yesterday (2/24/23) after a two day stay for concern for drug overdose. The patient was initially admitted to the ICU from the ED on 2/21/22 after being found unresponsive behind the [employer] at which he works. EMS was called and the patient was given Narcan. He was brought to the ED where he was intubated for airway protection. Tested positive for cocaine and fentanyl, as well as EtOH and cannabinoids on admission. Was extubated on 2/22/23. Was on CIWA protocol for alcohol withdrawal. Patient attempted to leave AMA from the ICU on 2/24/23 as he stated to nursing that he wanted \"to go to the bar. \" Patient also stated that he needed to get to work. The patient stood up from the bed and immediately started to sway on his feet and become unsteady, losing his balance and having to grab onto the telemetry monitor and prop himself against the wall to keep from falling. Discussed that he was not able to physically walk out of the ICU due to his inability to walk. Despite repeated discussions on the risks of trying to leave and walk down the street in the snow without being able to physically walk, patient was not comprehending the consequences of leaving AMA.  He could not provide teach-back on what to do if he were to fall while trying to leave the hospital, did not acknowledge his inability to walk without falling, did not provide reasoning for wanting to leave and did not provide understanding of his risk of withdrawal from alcohol and opioids. It was at that time the patient was deemed incapacitated to make medical decisions. He then threatened to \"run straight through\" the medical staff in order to leave. Pink slip was signed for patient at that time. He was subsequently transferred to the medical floor on CIWA protocol with a psych consult in place. Psych did see the patient, determining he was ultimately not a candidate for inpatient psychiatric evaluation as the patient was not actively homicidal or suicidal. It was recommended to consult Dr. Rosa Herrera from Addiction Medicine. It was also noted that the patient was likely withdrawing from his polysubstance abuse at that time. Patient continued to become more agitated throughout the night yesterday and into the morning today. Noted in the chart that the patient was continuing with significant discomfort despite medications given to the patient. Patient was noted to have persistently elevated CIWA scores on the floors. The patient was then transferred to the ICU for further evaluation and management. On initial evaluation in the ICU, the patient was agitated. It was noted by nursing staff that is IV sight was bent, not allowing for medications to be properly delivered and causing the patient pain. IV site was exchanged and patient became much calmer. Patient remained stable throughout the evening, but did eventually become acutely agitated, attempting to punch his bedside nurse. Due to his attempts at physical violence with staff, the patient was given phenobarbital and started on a Precedex infusion. On re-examination, patient appeared much calmer and comfortable in bed. He denied any chest pain, abdominal pain, nausea, vomiting, and shortness of breath at that time.  He was overall unable to provide other medical history or review of systems due to sedation, but did admit to smoking 1 pack of cigarettes daily and semi-regular cannabis use. He denied other drug use. Past Medical History:      Diagnosis Date    Atrial fibrillation ([address]     Hypertension        Past Surgical History:        Procedure Laterality Date    ANGIOPLASTY      HAND SURGERY Left     pins in lt hand       Medications Prior to Admission:    Prior to Admission medications    Not on File       Allergies:  Red dye    Social History:   TOBACCO:   reports that he has been smoking cigarettes. He started smoking about 37 years ago. He has been smoking an average of .5 packs per day. He has never used smokeless tobacco.  ETOH:   reports current alcohol use.   OCCUPATION: works at [employer] History:       Adopted: Yes       REVIEW OF SYSTEMS:    Unable to obtain complete ROS due to patient condition  Denied shortness of breath, chest pain  Denied abdominal pain, nausea, vomiting    Physical Exam   Vitals: BP (!) 150/89   Pulse 86   Temp 99.1 °F (37.3 °C) (Axillary)   Resp 19   Ht 5' 10\" (1.778 m)   Wt 171 lb 8 oz (77.8 kg)   SpO2 96%   BMI 24.61 kg/m²           General Appearance: in no acute distress, lethargic, and easily agitated  Skin: warm and dry, no rash or erythema  Head: normocephalic and atraumatic  Eyes: pupils equal, round, and reactive to light  Pulmonary/Chest: clear to auscultation bilaterally- no wheezes, rales or rhonchi, normal air movement, no respiratory distress  Cardiovascular: normal rate, no murmurs, and no carotid bruits  Abdomen: soft, non-tender, non-distended, normal bowel sounds, no masses or organomegaly  Extremities: no cyanosis and no clubbing  Musculoskeletal: normal range of motion, no joint swelling, deformity or tenderness  Neurologic: speech abnormal- slurred speech     Lines     site day    Art line   None    TLC None    PICC None    Hemoaccess None    Oxygen:    Room air  ABG:     Lab Results Component Value Date/Time    PH 7.429 02/22/2023 04:30 AM    PCO2 33.9 02/22/2023 04:30 AM    PO2 85.3 02/22/2023 04:30 AM    HCO3 21.9 02/22/2023 04:30 AM    BE -1.7 02/22/2023 04:30 AM    THB 13.3 02/22/2023 04:30 AM    O2SAT 96.4 02/22/2023 04:30 AM     Labs and Imaging Studies   Basic Labs  CBC:   Lab Results   Component Value Date/Time    WBC 8.0 02/25/2023 11:16 AM    RBC 5.10 02/25/2023 11:16 AM    HGB 16.3 02/25/2023 11:16 AM    HCT 45.2 02/25/2023 11:16 AM    MCV 88.6 02/25/2023 11:16 AM    RDW 13.7 02/25/2023 11:16 AM     02/25/2023 11:16 AM     CMP:  Lab Results   Component Value Date/Time     02/25/2023 11:16 AM    K 3.8 02/25/2023 11:16 AM     02/25/2023 11:16 AM    CO2 21 02/25/2023 11:16 AM    BUN 15 02/25/2023 11:16 AM    PROT 7.1 02/25/2023 11:16 AM       Imaging Studies:     CT HEAD WO CONTRAST    Result Date: 2/21/2023  EXAMINATION: CT OF THE HEAD WITHOUT CONTRAST  2/21/2023 1:46 pm TECHNIQUE: CT of the head was performed without the administration of intravenous contrast. Automated exposure control, iterative reconstruction, and/or weight based adjustment of the mA/kV was utilized to reduce the radiation dose to as low as reasonably achievable. COMPARISON: None. HISTORY: ORDERING SYSTEM PROVIDED HISTORY: ams TECHNOLOGIST PROVIDED HISTORY: Has a \"code stroke\" or \"stroke alert\" been called? ->No Reason for exam:->ams Decision Support Exception - unselect if not a suspected or confirmed emergency medical condition->Emergency Medical Condition (MA) What reading provider will be dictating this exam?->CRC FINDINGS: BRAIN/VENTRICLES: There is no acute intracranial hemorrhage, mass effect or midline shift. No abnormal extra-axial fluid collection. The gray-white differentiation is maintained without evidence of an acute infarct. There is no evidence of hydrocephalus. ORBITS: The visualized portion of the orbits demonstrate no acute abnormality.  SINUSES: The visualized paranasal sinuses and mastoid air cells demonstrate no acute abnormality. There is right maxillary and ethmoid sinus disease. SOFT TISSUES/SKULL:  No acute abnormality of the visualized skull or soft tissues. No acute intracranial abnormality. Specifically, there is no acute intracranial hemorrhage Right maxillary and bilateral ethmoid sinus disease. CT HEAD WO CONTRAST    Result Date: 2/17/2023  EXAMINATION: CT OF THE HEAD WITHOUT CONTRAST  2/17/2023 11:07 pm TECHNIQUE: CT of the head was performed without the administration of intravenous contrast. Automated exposure control, iterative reconstruction, and/or weight based adjustment of the mA/kV was utilized to reduce the radiation dose to as low as reasonably achievable. COMPARISON: May 25, 2018 HISTORY: ORDERING SYSTEM PROVIDED HISTORY: confusion TECHNOLOGIST PROVIDED HISTORY: Reason for exam:->confusion Has a \"code stroke\" or \"stroke alert\" been called? ->No Decision Support Exception - unselect if not a suspected or confirmed emergency medical condition->Emergency Medical Condition (MA) What reading provider will be dictating this exam?->CRC FINDINGS: BRAIN/VENTRICLES: There is no acute intracranial hemorrhage, mass effect or midline shift. No abnormal extra-axial fluid collection. The gray-white differentiation is maintained without evidence of an acute infarct. There is no evidence of hydrocephalus. ORBITS: The visualized portion of the orbits demonstrate no acute abnormality. SINUSES: The visualized paranasal sinuses and mastoid air cells demonstrate no acute abnormality. Inferior right maxillary sinus mucosal thickening. SOFT TISSUES/SKULL:  No acute abnormality of the visualized skull or soft tissues. No acute intracranial abnormality. RECOMMENDATIONS: Careful clinical correlation and follow up recommended.      CT CERVICAL SPINE WO CONTRAST    Result Date: 2/21/2023  EXAMINATION: CT OF THE CERVICAL SPINE WITHOUT CONTRAST 2/21/2023 1:46 pm TECHNIQUE: CT of the cervical spine was performed without the administration of intravenous contrast. Multiplanar reformatted images are provided for review. Automated exposure control, iterative reconstruction, and/or weight based adjustment of the mA/kV was utilized to reduce the radiation dose to as low as reasonably achievable. COMPARISON: None. HISTORY: ORDERING SYSTEM PROVIDED HISTORY: trauma TECHNOLOGIST PROVIDED HISTORY: Reason for exam:->trauma Decision Support Exception - unselect if not a suspected or confirmed emergency medical condition->Emergency Medical Condition (MA) What reading provider will be dictating this exam?->CRC FINDINGS: The ring of C1 is intact as is the dense. There is no compression fracture of the cervical spine. No jumped or perched facet is noted. Multilevel degenerative disc and degenerative joint disease is noted. The prevertebral soft tissues are unremarkable. The airway is widely patent. Images through the lung apices are negative for a pneumothorax. 1. There is no acute compression fracture or subluxation of the cervical spine. 2. Multilevel degenerative disc and degenerative joint disease. XR CHEST PORTABLE    Result Date: 2/22/2023  EXAMINATION: ONE XRAY VIEW OF THE CHEST 2/22/2023 7:01 am COMPARISON: 21 February 2023 HISTORY: ORDERING SYSTEM PROVIDED HISTORY: intubated TECHNOLOGIST PROVIDED HISTORY: Reason for exam:->intubated What reading provider will be dictating this exam?->CRC FINDINGS: Endotracheal tube tip is just above the thoracic inlet. Normal heart and pulmonary vascularity. Lungs are clear. Neither costophrenic angle is blunted. Advancement of endotracheal tube to just above the thoracic inlet. No new abnormal findings.      XR CHEST PORTABLE    Result Date: 2/21/2023  EXAMINATION: ONE XRAY VIEW OF THE CHEST 2/21/2023 1:17 pm COMPARISON: 17 February 2023 HISTORY: ORDERING SYSTEM PROVIDED HISTORY: weakness, Possible Stroke TECHNOLOGIST PROVIDED HISTORY: Reason for exam:->weakness, Possible Stroke What reading provider will be dictating this exam?->CRC FINDINGS: Endotracheal tube is in the high position anterior to T1 vertebra. The lungs are without acute focal process. There is no effusion or pneumothorax. The cardiomediastinal silhouette is without acute process. The osseous structures are without acute process. High position of endotracheal tube. No acute process. XR CHEST PORTABLE    Result Date: 2023  EXAMINATION: ONE XRAY VIEW OF THE CHEST 2023 7:44 pm COMPARISON: Chest two view, 2007 HISTORY: ORDERING SYSTEM PROVIDED HISTORY: hypoxia TECHNOLOGIST PROVIDED HISTORY: Reason for exam:->hypoxia What reading provider will be dictating this exam?->CRC FINDINGS: Lines, tubes, and devices: None. Lungs and pleura: No focal consolidation. No pleural effusion. No pneumothorax. Cardiomediastinal silhouette: The heart size is normal. Bones and soft tissues: Unremarkable. No acute process. Stable exam.     US DUP LOWER EXTREMITY RIGHT MADELIN    Result Date: 2023  Patient MRN:  [id number] : 1970 Age: 45 years Gender: Male Order Date:  2023 1:04 PM EXAM: US DUP LOWER EXTREMITY RIGHT MADELIN NUMBER OF IMAGES:  27 INDICATION:  evaluate for DVT in the right thigh evaluate for DVT in the right thigh What reading provider will be dictating this exam?->[facility] Within the visualized vessels, there is no evidence for deep venous thrombosis There is good compressibility, there is good augmentation, there is good color flow.      Within the visualized vessels there is no evidence for deep venous thrombosis       Resident's Assessment and Plan     Dee Richards is a 45 y.o. male who initially presented with acute overdose who is now being treated in the ICU for agitation and concern for polysubstance withdrawal (opioid and alcohol)     Neuro   Acute encephalopathy 2/2 alcohol withdrawal  CIWA score documented 27 on general medical floor   Repeat CIWA on admission to ICU 1 hour later 6 after exchanging IV  Acute agitation since admission requiring phenobarbital and Precedex  Continue sedation due to threat of physical harm to staff, wean as able  Phenobarbital 32.5 mg q6 hrs for 4 doses  Continue CIWA protocol   Monitor mentation  Cardio   Hypertension in the setting of alcohol withdrawal  Continue CIWA protocol and phenobarbital as documented above  Monitor vitals  History of paroxysmal atrial fibrillation  Currently in NSR off medications  Pulmonary   Tachypnea in the setting of alcohol withdrawal  RR 30-32  Discussed adhesive pulse-ox monitoring with nursing staff to better assess respiratory status in the setting of CIWA with Ativan  Follow vitals   GI   Hepatitis C positive - new diagnosis   Hep C reactive on 2/22/23  No previous Hep C testing in EMR (chart marked for merge, both charts checked)   Obtain viral load   GI referral on discharge   Transaminitis in the setting of alcohol abuse and hepatitis C  , AST 76, stable  Psych   Polysubstance abuse   Cocaine, Fentanyl, Cannabis, EtOH  Hep C +, HIV neg  Continue high dose thiamine  Continue CIWA and phenobarbital  Consider Addiction Medicine Consult with Dr. Courtney Vazquez problems resolved since admission  Acute encephalopathy 2/2 drug overdose   Acute hypoxic respiratory failure in the setting of drug overdose  HAGMA 2/2 lactic acidosos        PT/OT evaluation: Not indicated at this time  DVT prophylaxis/ GI prophylaxis: Lovenox/Protonix  Disposition: Patient remains at risk for decompensation requiring ICU level care.      Ying Lovett DO, PGY-1   Attending physician: Dr. Poli Flores

## 2023-02-26 NOTE — PROGRESS NOTES
Hospitalist Progress Note      Synopsis: Patient admitted on 2/21/2023   45 gentleman with history of drug use and A-fib and hypertension. He was found laying down behind the soup kitchen. Patient was positive for optimization. His images resuscitation including Narcan were given and he was starting to have significant behavior issues including screaming. He was intubated because of lack of protection of airway. He remained in the ICU and he was finally extubated on the 22nd. He has been diagnosed with behavioral disorder and agitation due to severe alcohol withdrawal.  He is now on a monitored floor as of now and continues to receive significant dosing of medications because of CIWA protocol. ICU stay included imaging of his brain which did not show any acute issues. CT of the cervical spine was negative. Due to him being pink slipped it was important to get psych services to see him and they feel that he is still in delirium and they would like him medically cleared till he is able to be moved to their floor  Subjective  He remains in four-point restraints. He is trying to sit up. Does not really follow all commands but does try to follow some  February 26  Patient now in the ICU because of increase in his CIWA score yesterday    Exam:  /69   Pulse 66   Temp 99 °F (37.2 °C) (Axillary)   Resp 27   Ht 5' 10\" (1.778 m)   Wt 174 lb 4.8 oz (79.1 kg)   SpO2 91%   BMI 25.01 kg/m²   General appearance: Face is covered with the BiPAP   HEENT: Eyes are closed neck: Trachea midline. Respiratory:  Normal respiratory effort. Clear to auscultation, bilaterally without Rales/Wheezes/Rhonchi. Cardiovascular: Regular rate and rhythm with normal S1/S2 without murmurs, rubs or gallops. Abdomen: Soft, non-tender, non-distended with normal bowel sounds. Musculoskeletal: No clubbing, cyanosis or edema bilaterally. Brisk capillary refill. 2+ lower extremity pulses (dorsalis pedis).    Skin:  No rashes Neurologic: He is sedated right now and not following commands  Medications:  Reviewed    Infusion Medications    dextrose 5 % and 0.45 % NaCl 100 mL/hr at 02/26/23 0717    [Held by provider] dexmedetomidine (PRECEDEX) IV infusion 0.8 mcg/kg/hr (02/26/23 0717)    sodium chloride Stopped (02/23/23 0553)     Scheduled Medications    magnesium sulfate  2,000 mg IntraVENous Once    PHENobarbital  32.5 mg IntraVENous Q6H    OLANZapine zydis  15 mg Oral Nightly    vitamin D  50,000 Units Oral Weekly    vitamin D  2,000 Units Oral Daily    vitamin B-12  1,000 mcg Oral Daily    nicotine  1 patch TransDERmal Daily    folic acid  1 mg Oral Daily    pantoprazole  40 mg Oral QAM AC    sodium chloride flush  5-40 mL IntraVENous 2 times per day    enoxaparin  40 mg SubCUTAneous Daily    thiamine (VITAMIN B1) IVPB  250 mg IntraVENous Q24H    Followed by    April Smith ON 2/28/2023] thiamine  100 mg Oral Daily     PRN Meds: [Held by provider] LORazepam, ipratropium-albuterol, [Held by provider] LORazepam, [Held by provider] LORazepam, [Held by provider] LORazepam, Menthol, sodium chloride flush, sodium chloride, ondansetron **OR** ondansetron, polyethylene glycol, acetaminophen **OR** acetaminophen    I/O    Intake/Output Summary (Last 24 hours) at 2/26/2023 1006  Last data filed at 2/26/2023 0717  Gross per 24 hour   Intake 2061.73 ml   Output 1250 ml   Net 811.73 ml         Labs:   Recent Labs     02/24/23 0439 02/25/23  1116 02/26/23  0525   WBC 7.7 8.0 8.8   HGB 14.7 16.3 15.1   HCT 41.1 45.2 41.7    232 239         Recent Labs     02/24/23 0439 02/25/23  1116 02/26/23  0525    141 141   K 4.4 3.8 4.3    104 106   CO2 21* 21* 22   BUN 7 15 11   CREATININE 0.8 0.8 0.7   CALCIUM 8.5* 9.5 8.6   PHOS 2.7 3.0 3.0         Recent Labs     02/24/23 0439 02/25/23  1116 02/26/23  0525   PROT 6.6 7.1 6.5   ALKPHOS 46 49 46   * 100* 109*   AST 65* 76* 104*   BILITOT 0.9 1.0 0.8         No results for input(s): INR in the last 72 hours. No results for input(s): Dieter Kemp in the last 72 hours. Chronic labs:  Lab Results   Component Value Date    TRIG 36 02/23/2023    TSH 1.600 02/23/2023    INR 0.9 02/21/2023       Microbiology:  Pending  No results for input(s): BC in the last 72 hours. No results for input(s): ORG in the last 72 hours. No results for input(s): Agnes Gray in the last 72 hours. No results for input(s): STREPNEUMAGU in the last 72 hours. No results for input(s): LP1UAG in the last 72 hours. No results for input(s): ASO in the last 72 hours. No results for input(s): CULTRESP in the last 72 hours. No results for input(s): PROCAL in the last 72 hours. Radiology:  CT HEAD WO CONTRAST    Result Date: 2/21/2023  No acute intracranial abnormality. Specifically, there is no acute intracranial hemorrhage Right maxillary and bilateral ethmoid sinus disease. CT HEAD WO CONTRAST    Result Date: 2/17/2023  No acute intracranial abnormality. RECOMMENDATIONS: Careful clinical correlation and follow up recommended. CT CERVICAL SPINE WO CONTRAST    Result Date: 2/21/2023  1. There is no acute compression fracture or subluxation of the cervical spine. 2. Multilevel degenerative disc and degenerative joint disease. XR CHEST PORTABLE    Result Date: 2/22/2023  Advancement of endotracheal tube to just above the thoracic inlet. No new abnormal findings. XR CHEST PORTABLE    Result Date: 2/21/2023  High position of endotracheal tube. No acute process. XR CHEST PORTABLE    Result Date: 2/17/2023  No acute process.  Stable exam.     US DUP LOWER EXTREMITY RIGHT MADELIN    Result Date: 2/23/2023  Within the visualized vessels there is no evidence for deep venous thrombosis       ASSESSMENT:    Principal Problem:    Accidental drug overdose  Active Problems:    Acute respiratory failure (HCC)    Polysubstance abuse ([address]    Noncompliance    HTN (hypertension)    Cocaine abuse ([address]    Narcotic abuse ([facility]    Marijuana abuse    [place] coma scale total score 3-8 (HCC)    Alcoholism (HCC)    Alcohol abuse    Alcohol dependence ([facility]    Iron deficiency anemia    DTs (delirium tremens) ([facility]    Atrial fibrillation (HCC)    SVT (supraventricular tachycardia) ([facility]  Resolved Problems:    * No resolved hospital problems. *       PLAN:    1. Patient unable to follow commands or swallow safely. Bedside evaluation yesterday resulted in clinical impression of not safe to swallow  2. We need to insert an NGT and start feeding him to avoid lack of nutrition and toxicities of different medications  3. Follow-up labs for today  4. Order ammonia level as well  5. Start hydration    Addendum  Patient did pass his bedside swallow for right now and we are going to start feeding him with small bite sized foods    Ciwa  is increasing   Back to icu    D/w Vahid Helms and ICU DR Howard    February 26  Eventful overnight. He is now in the ICU. He has a BiPAP on because of ABGs showing increase in CO2 retention likely due to multiple medications being needed for agitation and additive effect. He definitely is more calm. He was intubated at his first discovery of drug overdose and for right now he is being watched to avoid reintubation. His withdrawal seems to be lasting longer than expected. Liver test noted. Now with a low-grade fever. IV fluids in place. Pulmonary critical care following. Earlier low blood pressure resulted in a fluid bolus as well  Diet: ADULT DIET; Dysphagia - Pureed; 5 carb choices (75 gm/meal)  Code Status: Full Code  PT/OT Eval Status:   not following commands   DVT Prophylaxis:   lovenox  Recommended disposition at discharge:   tbd     +++++++++++++++++++++++++++++++++++++++++++++++++  Katie Lawrence MD   [facility].  +++++++++++++++++++++++++++++++++++++++++++++++++  NOTE: This report was transcribed using voice recognition software.  Every effort was made to ensure accuracy; however, inadvertent computerized transcription errors may be present.

## 2023-02-26 NOTE — PLAN OF CARE
Problem: Safety - Medical Restraint  Goal: Remains free of injury from restraints (Restraint for Interference with Medical Device)  Description: INTERVENTIONS:  1. Determine that other, less restrictive measures have been tried or would not be effective before applying the restraint  2. Evaluate the patient's condition at the time of restraint application  3. Inform patient/family regarding the reason for restraint  4.  Q2H: Monitor safety, psychosocial status, comfort, nutrition and hydration  2/26/2023 0013 by Ryan Guzman RN  Outcome: Progressing  Flowsheets (Taken 2/26/2023 0000)  Remains free of injury from restraints (restraint for interference with medical device): Determine that other, less restrictive measures have been tried or would not be effective before applying the restraint     Problem: Pain  Goal: Verbalizes/displays adequate comfort level or baseline comfort level  2/26/2023 0013 by Ryan Guzman RN  Outcome: Progressing  Flowsheets (Taken 2/26/2023 0000)  Verbalizes/displays adequate comfort level or baseline comfort level: Encourage patient to monitor pain and request assistance     Problem: Safety - Adult  Goal: Free from fall injury  2/26/2023 0013 by Ryan Guzman RN  Outcome: Progressing  Flowsheets (Taken 2/26/2023 0000)  Free From Fall Injury: Instruct family/caregiver on patient safety     Problem: Neurosensory - Adult  Goal: Achieves stable or improved neurological status  2/26/2023 0013 by Ryan Guzman RN  Outcome: Progressing  Flowsheets (Taken 2/26/2023 0000)  Achieves stable or improved neurological status: Assess for and report changes in neurological status     Problem: Neurosensory - Adult  Goal: Achieves maximal functionality and self care  2/26/2023 0013 by Ryan Guzman RN  Outcome: Progressing  Flowsheets (Taken 2/26/2023 0000)  Achieves maximal functionality and self care: Monitor swallowing and airway patency with patient fatigue and changes in neurological status     Problem: Respiratory - Adult  Goal: Achieves optimal ventilation and oxygenation  2/26/2023 0013 by Edi Barney RN  Outcome: Progressing  Flowsheets (Taken 2/26/2023 0000)  Achieves optimal ventilation and oxygenation: Assess for changes in respiratory status     Problem: Cardiovascular - Adult  Goal: Maintains optimal cardiac output and hemodynamic stability  2/26/2023 0013 by Edi Barney RN  Outcome: Progressing  Flowsheets (Taken 2/26/2023 0000)  Maintains optimal cardiac output and hemodynamic stability: Monitor blood pressure and heart rate     Problem: Cardiovascular - Adult  Goal: Absence of cardiac dysrhythmias or at baseline  2/26/2023 0013 by Edi Barney RN  Outcome: Progressing  Flowsheets (Taken 2/26/2023 0000)  Absence of cardiac dysrhythmias or at baseline: Monitor cardiac rate and rhythm     Problem: Musculoskeletal - Adult  Goal: Return mobility to safest level of function  2/26/2023 0013 by Edi Barney RN  Outcome: Progressing  Flowsheets (Taken 2/26/2023 0000)  Return Mobility to Safest Level of Function: Assess patient stability and activity tolerance for standing, transferring and ambulating with or without assistive devices     Problem: Musculoskeletal - Adult  Goal: Maintain proper alignment of affected body part  2/26/2023 0013 by Edi Barney RN  Outcome: Progressing  Flowsheets (Taken 2/26/2023 0000)  Maintain proper alignment of affected body part: Support and protect limb and body alignment per provider's orders     Problem: Musculoskeletal - Adult  Goal: Return ADL status to a safe level of function  2/26/2023 0013 by Edi Barney RN  Outcome: Progressing  Flowsheets (Taken 2/26/2023 0000)  Return ADL Status to a Safe Level of Function: Administer medication as ordered     Problem: Gastrointestinal - Adult  Goal: Minimal or absence of nausea and vomiting  2/26/2023 0013 by Edi Barney RN  Outcome: Progressing  Flowsheets (Taken 2/26/2023 0000)  Minimal or absence of nausea and vomiting: Administer IV fluids as ordered to ensure adequate hydration     Problem: Gastrointestinal - Adult  Goal: Maintains or returns to baseline bowel function  2/26/2023 0013 by Ignacio Gordon RN  Outcome: Progressing  Flowsheets (Taken 2/26/2023 0000)  Maintains or returns to baseline bowel function: Assess bowel function     Problem: Gastrointestinal - Adult  Goal: Maintains adequate nutritional intake  2/26/2023 0013 by Ignacio Gordon RN  Outcome: Progressing  Flowsheets (Taken 2/26/2023 0000)  Maintains adequate nutritional intake: Monitor intake and output, weight and lab values     Problem: Genitourinary - Adult  Goal: Absence of urinary retention  2/26/2023 0013 by Ignacio Gordon RN  Outcome: Progressing  Flowsheets (Taken 2/26/2023 0000)  Absence of urinary retention: Assess patients ability to void and empty bladder     Problem: Genitourinary - Adult  Goal: Urinary catheter remains patent  2/26/2023 0013 by Ignacio Gordon RN  Outcome: Progressing  Flowsheets (Taken 2/26/2023 0000)  Urinary catheter remains patent: Assess patency of urinary catheter     Problem: Skin/Tissue Integrity  Goal: Absence of new skin breakdown  Description: 1. Monitor for areas of redness and/or skin breakdown  2. Assess vascular access sites hourly  3. Every 4-6 hours minimum:  Change oxygen saturation probe site  4. Every 4-6 hours:  If on nasal continuous positive airway pressure, respiratory therapy assess nares and determine need for appliance change or resting period.   2/26/2023 0013 by Ignacio Gordon RN  Outcome: Progressing

## 2023-02-27 LAB
ALBUMIN SERPL-MCNC: 3.1 G/DL (ref 3.5–5.2)
ALP BLD-CCNC: 44 U/L (ref 40–129)
ALT SERPL-CCNC: 111 U/L (ref 0–40)
ANION GAP SERPL CALCULATED.3IONS-SCNC: 10 MMOL/L (ref 7–16)
AST SERPL-CCNC: 95 U/L (ref 0–39)
BASOPHILS ABSOLUTE: 0.03 E9/L (ref 0–0.2)
BASOPHILS RELATIVE PERCENT: 0.4 % (ref 0–2)
BILIRUB SERPL-MCNC: 0.8 MG/DL (ref 0–1.2)
BUN BLDV-MCNC: 8 MG/DL (ref 6–20)
CALCIUM SERPL-MCNC: 8.2 MG/DL (ref 8.6–10.2)
CHLORIDE BLD-SCNC: 104 MMOL/L (ref 98–107)
CO2: 24 MMOL/L (ref 22–29)
CREAT SERPL-MCNC: 0.7 MG/DL (ref 0.7–1.2)
EOSINOPHILS ABSOLUTE: 0.06 E9/L (ref 0.05–0.5)
EOSINOPHILS RELATIVE PERCENT: 0.9 % (ref 0–6)
GFR SERPL CREATININE-BSD FRML MDRD: >60 ML/MIN/1.73
GLUCOSE BLD-MCNC: 127 MG/DL (ref 74–99)
HCT VFR BLD CALC: 38.9 % (ref 37–54)
HEMOGLOBIN: 14.2 G/DL (ref 12.5–16.5)
IMMATURE GRANULOCYTES #: 0.04 E9/L
IMMATURE GRANULOCYTES %: 0.6 % (ref 0–5)
LYMPHOCYTES ABSOLUTE: 1.08 E9/L (ref 1.5–4)
LYMPHOCYTES RELATIVE PERCENT: 16.1 % (ref 20–42)
MAGNESIUM: 1.8 MG/DL (ref 1.6–2.6)
MCH RBC QN AUTO: 32 PG (ref 26–35)
MCHC RBC AUTO-ENTMCNC: 36.5 % (ref 32–34.5)
MCV RBC AUTO: 87.6 FL (ref 80–99.9)
MONOCYTES ABSOLUTE: 1.08 E9/L (ref 0.1–0.95)
MONOCYTES RELATIVE PERCENT: 16.1 % (ref 2–12)
NEUTROPHILS ABSOLUTE: 4.41 E9/L (ref 1.8–7.3)
NEUTROPHILS RELATIVE PERCENT: 65.9 % (ref 43–80)
PDW BLD-RTO: 13.4 FL (ref 11.5–15)
PHOSPHORUS: 2.7 MG/DL (ref 2.5–4.5)
PLATELET # BLD: 221 E9/L (ref 130–450)
PMV BLD AUTO: 9.3 FL (ref 7–12)
POTASSIUM SERPL-SCNC: 3.8 MMOL/L (ref 3.5–5)
RBC # BLD: 4.44 E12/L (ref 3.8–5.8)
SODIUM BLD-SCNC: 138 MMOL/L (ref 132–146)
TOTAL PROTEIN: 5.7 G/DL (ref 6.4–8.3)
WBC # BLD: 6.7 E9/L (ref 4.5–11.5)

## 2023-02-27 PROCEDURE — 6370000000 HC RX 637 (ALT 250 FOR IP): Performed by: INTERNAL MEDICINE

## 2023-02-27 PROCEDURE — 2500000003 HC RX 250 WO HCPCS: Performed by: INTERNAL MEDICINE

## 2023-02-27 PROCEDURE — 6370000000 HC RX 637 (ALT 250 FOR IP)

## 2023-02-27 PROCEDURE — 85025 COMPLETE CBC W/AUTO DIFF WBC: CPT

## 2023-02-27 PROCEDURE — 2580000003 HC RX 258: Performed by: INTERNAL MEDICINE

## 2023-02-27 PROCEDURE — 6360000002 HC RX W HCPCS: Performed by: INTERNAL MEDICINE

## 2023-02-27 PROCEDURE — 1200000000 HC SEMI PRIVATE

## 2023-02-27 PROCEDURE — 80053 COMPREHEN METABOLIC PANEL: CPT

## 2023-02-27 PROCEDURE — 94660 CPAP INITIATION&MGMT: CPT

## 2023-02-27 PROCEDURE — 6360000002 HC RX W HCPCS

## 2023-02-27 PROCEDURE — 87522 HEPATITIS C REVRS TRNSCRPJ: CPT

## 2023-02-27 PROCEDURE — 84100 ASSAY OF PHOSPHORUS: CPT

## 2023-02-27 PROCEDURE — 83735 ASSAY OF MAGNESIUM: CPT

## 2023-02-27 RX ORDER — MAGNESIUM SULFATE IN WATER 40 MG/ML
2000 INJECTION, SOLUTION INTRAVENOUS ONCE
Status: COMPLETED | OUTPATIENT
Start: 2023-02-27 | End: 2023-02-27

## 2023-02-27 RX ORDER — NICOTINE 21 MG/24HR
PATCH, TRANSDERMAL 24 HOURS TRANSDERMAL
Status: DISCONTINUED
Start: 2023-02-27 | End: 2023-02-27 | Stop reason: WASHOUT

## 2023-02-27 RX ORDER — POTASSIUM CHLORIDE 20 MEQ/1
20 TABLET, EXTENDED RELEASE ORAL ONCE
Status: COMPLETED | OUTPATIENT
Start: 2023-02-27 | End: 2023-02-27

## 2023-02-27 RX ORDER — IBUPROFEN 400 MG/1
400 TABLET ORAL ONCE
Status: COMPLETED | OUTPATIENT
Start: 2023-02-27 | End: 2023-02-27

## 2023-02-27 RX ADMIN — DEXMEDETOMIDINE 0.2 MCG/KG/HR: 100 INJECTION, SOLUTION INTRAVENOUS at 05:19

## 2023-02-27 RX ADMIN — SODIUM CHLORIDE, PRESERVATIVE FREE 10 ML: 5 INJECTION INTRAVENOUS at 20:03

## 2023-02-27 RX ADMIN — DEXTROSE AND SODIUM CHLORIDE: 5; 450 INJECTION, SOLUTION INTRAVENOUS at 05:21

## 2023-02-27 RX ADMIN — FOLIC ACID 1 MG: 1 TABLET ORAL at 09:32

## 2023-02-27 RX ADMIN — CYANOCOBALAMIN TAB 1000 MCG 1000 MCG: 1000 TAB at 09:32

## 2023-02-27 RX ADMIN — OLANZAPINE 15 MG: 5 TABLET, ORALLY DISINTEGRATING ORAL at 19:00

## 2023-02-27 RX ADMIN — Medication 2000 UNITS: at 09:32

## 2023-02-27 RX ADMIN — POTASSIUM CHLORIDE 20 MEQ: 1500 TABLET, EXTENDED RELEASE ORAL at 06:16

## 2023-02-27 RX ADMIN — SODIUM CHLORIDE, PRESERVATIVE FREE 10 ML: 5 INJECTION INTRAVENOUS at 09:32

## 2023-02-27 RX ADMIN — ACETAMINOPHEN 650 MG: 325 TABLET ORAL at 18:03

## 2023-02-27 RX ADMIN — THIAMINE HYDROCHLORIDE 250 MG: 100 INJECTION, SOLUTION INTRAMUSCULAR; INTRAVENOUS at 20:03

## 2023-02-27 RX ADMIN — DEXTROSE AND SODIUM CHLORIDE: 5; 450 INJECTION, SOLUTION INTRAVENOUS at 04:41

## 2023-02-27 RX ADMIN — PANTOPRAZOLE SODIUM 40 MG: 40 TABLET, DELAYED RELEASE ORAL at 05:31

## 2023-02-27 RX ADMIN — IBUPROFEN 400 MG: 400 TABLET, FILM COATED ORAL at 22:10

## 2023-02-27 RX ADMIN — ENOXAPARIN SODIUM 40 MG: 100 INJECTION SUBCUTANEOUS at 09:31

## 2023-02-27 RX ADMIN — MAGNESIUM SULFATE HEPTAHYDRATE 2000 MG: 40 INJECTION, SOLUTION INTRAVENOUS at 09:23

## 2023-02-27 ASSESSMENT — PAIN DESCRIPTION - LOCATION
LOCATION: ARM
LOCATION: ARM

## 2023-02-27 ASSESSMENT — PAIN DESCRIPTION - ORIENTATION
ORIENTATION: LEFT
ORIENTATION: LEFT

## 2023-02-27 ASSESSMENT — PAIN DESCRIPTION - DESCRIPTORS
DESCRIPTORS: ACHING;DISCOMFORT;SORE
DESCRIPTORS: ACHING;DISCOMFORT;PRESSURE;SORE

## 2023-02-27 ASSESSMENT — PAIN SCALES - GENERAL
PAINLEVEL_OUTOF10: 0
PAINLEVEL_OUTOF10: 0
PAINLEVEL_OUTOF10: 10
PAINLEVEL_OUTOF10: 5
PAINLEVEL_OUTOF10: 0

## 2023-02-27 ASSESSMENT — PAIN DESCRIPTION - PAIN TYPE: TYPE: ACUTE PAIN

## 2023-02-27 ASSESSMENT — PAIN DESCRIPTION - FREQUENCY: FREQUENCY: INTERMITTENT

## 2023-02-27 NOTE — PROGRESS NOTES
[address] [facility]  Department of Internal Medicine   Internal Medicine Residency   MICU Progress Note    Patient:  Luis Felipe President 45 y.o. male  MRN: [id number]     Date of Service: 2/27/2023    Allergy: Red dye    Subjective     I have seen and examine the patient at bed side. Patient is alert and oriented to time, place, and person. He was requesting to have some breakfast, reassured patient that diet order has been placed. Patient denies any chest pain, SOB. He has no concern about bowel and bladder. Objective     VS: BP (!) 147/92   Pulse 77   Temp 98.5 °F (36.9 °C) (Temporal)   Resp 26   Ht 5' 10\" (1.778 m)   Wt 171 lb 12.8 oz (77.9 kg)   SpO2 94%   BMI 24.65 kg/m²           I & O - 24hr:   Intake/Output Summary (Last 24 hours) at 2/27/2023 0916  Last data filed at 2/27/2023 0789  Gross per 24 hour   Intake 2171.66 ml   Output 850 ml   Net 1321.66 ml       Physical Exam:  General Appearance: In no distress, alert, and oriented. Neck: no adenopathy, no carotid bruit, no JVD, supple, symmetrical, trachea midline, and thyroid not enlarged, symmetric, no tenderness/mass/nodules  Lung: clear to auscultation bilaterally  Heart: regular rate and rhythm, S1, S2 normal, no murmur, click, rub or gallop  Abdomen: soft, non-tender; bowel sounds normal; no masses,  no organomegaly  Extremities:  extremities normal, atraumatic, no cyanosis or edema  Musculoskeletal: No joint swelling, no muscle tenderness. ROM normal in all joints of extremities. Neurologic: Following commands, all reflexes 2+, motor strength and sensation intact.     Lines     site day    Art line   None    TLC None    PICC None    Hemoaccess None    ABG:     Lab Results   Component Value Date/Time    PH 7.433 02/26/2023 09:09 AM    PCO2 34.1 02/26/2023 09:09 AM    PO2 170.2 02/26/2023 09:09 AM    HCO3 22.3 02/26/2023 09:09 AM    BE -1.2 02/26/2023 09:09 AM    THB 15.1 02/26/2023 09:09 AM    O2SAT 98.9 02/26/2023 09:09 AM Medications       Labs   CBC with Differential:    Lab Results   Component Value Date/Time    WBC 6.7 02/27/2023 04:14 AM    RBC 4.44 02/27/2023 04:14 AM    HGB 14.2 02/27/2023 04:14 AM    HCT 38.9 02/27/2023 04:14 AM     02/27/2023 04:14 AM    MCV 87.6 02/27/2023 04:14 AM    MCH 32.0 02/27/2023 04:14 AM    MCHC 36.5 02/27/2023 04:14 AM    RDW 13.4 02/27/2023 04:14 AM    LYMPHOPCT 16.1 02/27/2023 04:14 AM    MONOPCT 16.1 02/27/2023 04:14 AM    BASOPCT 0.4 02/27/2023 04:14 AM    MONOSABS 1.08 02/27/2023 04:14 AM    LYMPHSABS 1.08 02/27/2023 04:14 AM    EOSABS 0.06 02/27/2023 04:14 AM    BASOSABS 0.03 02/27/2023 04:14 AM     CMP:    Lab Results   Component Value Date/Time     02/27/2023 04:14 AM    K 3.8 02/27/2023 04:14 AM     02/27/2023 04:14 AM    CO2 24 02/27/2023 04:14 AM    BUN 8 02/27/2023 04:14 AM    CREATININE 0.7 02/27/2023 04:14 AM    GFRAA >60 05/25/2018 07:32 AM    LABGLOM >60 02/27/2023 04:14 AM    GLUCOSE 127 02/27/2023 04:14 AM    PROT 5.7 02/27/2023 04:14 AM    LABALBU 3.1 02/27/2023 04:14 AM    CALCIUM 8.2 02/27/2023 04:14 AM    BILITOT 0.8 02/27/2023 04:14 AM    ALKPHOS 44 02/27/2023 04:14 AM    AST 95 02/27/2023 04:14 AM     02/27/2023 04:14 AM         Resident's Assessment and Plan     Neurology:   Acute Encephalopathy likely 2/2 alcohol withdrawal, resolved  -CIWA score documented 27 on general medical floor  -Phenobarbital on hold as patient became somnolent and unresponsive in the morning. -CIWA protocol on hold, if needed can be resumed  -Monitor mentation    Cardiology:   History of paroxysmal A fibrillation  -Not on any mediation    Pulmonary:   Acute hypoxic failure likely patient was requiring more ATIVAN per CIWA protocol, resolved  -On 2/26/23 ABG showed CO2 of 55, after BIPAP, ABG improved, CXR normal.  -On BIPAP, off as tolerated  -Patient is on room air.      Gastroenterology:   Newly diagnosed hepatitis C on 2/22/23  -Follow viral load  -No previous Hep C testing in EMR    2. Transaminitis in the setting of alcohol abuse and hepatitis C  -Follow daily CMP lab    Psych  History of polysubstance abuse  -Cocaine, Fentanyl, cannabinoid, and Ethanol  -On admission SDS was positive for ethanol, UDS positive for cocaine, Meth, marijuana. PT/OT: Not required at this time  DVT ppx: Lovenox  GI ppx: Shaniqua Koenig MD, PGY-1    Attending physician: Dr. Lisa Salazar      I personally saw, examined and provided care for the patient. Radiographs, labs and medication list were reviewed by me independently. Review of Residents documentation was conducted and revisions were made as appropriate. I agree with the above documented exam, problem list and plan of care.       Tarun Jimenes, DO

## 2023-02-27 NOTE — PLAN OF CARE
Problem: Safety - Medical Restraint  Goal: Remains free of injury from restraints (Restraint for Interference with Medical Device)  Description: INTERVENTIONS:  1. Determine that other, less restrictive measures have been tried or would not be effective before applying the restraint  2. Evaluate the patient's condition at the time of restraint application  3. Inform patient/family regarding the reason for restraint  4.  Q2H: Monitor safety, psychosocial status, comfort, nutrition and hydration  Outcome: Progressing     Problem: Pain  Goal: Verbalizes/displays adequate comfort level or baseline comfort level  2/27/2023 0044 by Bob Garcia RN  Outcome: Progressing  2/26/2023 2036 by Pradeep Mendez RN  Outcome: Progressing  Flowsheets (Taken 2/26/2023 2000)  Verbalizes/displays adequate comfort level or baseline comfort level: Encourage patient to monitor pain and request assistance     Problem: Safety - Adult  Goal: Free from fall injury  2/27/2023 0044 by Bob Garcia RN  Outcome: Progressing  2/26/2023 2036 by Pradeep Mendez RN  Outcome: [place] (Taken 2/26/2023 2000)  Free From Fall Injury: Instruct family/caregiver on patient safety     Problem: Neurosensory - Adult  Goal: Achieves stable or improved neurological status  2/27/2023 0044 by Bob Garcia RN  Outcome: Progressing  2/26/2023 2036 by Pradeep Mendez RN  Outcome: Progressing  Flowsheets (Taken 2/26/2023 2000)  Achieves stable or improved neurological status: Assess for and report changes in neurological status     Problem: Neurosensory - Adult  Goal: Achieves maximal functionality and self care  2/27/2023 0044 by Bob Garcia RN  Outcome: Progressing  2/26/2023 2036 by Pradeep Mendez RN  Outcome: Progressing  Flowsheets (Taken 2/26/2023 2000)  Achieves maximal functionality and self care: Monitor swallowing and airway patency with patient fatigue and changes in neurological status     Problem: Cardiovascular - Adult  Goal: Maintains optimal cardiac output and hemodynamic stability  2/27/2023 0044 by Irina Gilliam RN  Outcome: Progressing  2/26/2023 2036 by Earle Breen RN  Outcome: Progressing  Flowsheets (Taken 2/26/2023 2000)  Maintains optimal cardiac output and hemodynamic stability: Monitor blood pressure and heart rate     Problem: Cardiovascular - Adult  Goal: Absence of cardiac dysrhythmias or at baseline  2/27/2023 0044 by Irina Gilliam RN  Outcome: Progressing  2/26/2023 2036 by Earle Breen RN  Outcome: Progressing  Flowsheets (Taken 2/26/2023 2000)  Absence of cardiac dysrhythmias or at baseline: Monitor cardiac rate and rhythm     Problem: Musculoskeletal - Adult  Goal: Return mobility to safest level of function  2/27/2023 0044 by Irina Gilliam RN  Outcome: Progressing  2/26/2023 2036 by Earle Breen RN  Outcome: Progressing  Flowsheets (Taken 2/26/2023 2000)  Return Mobility to Safest Level of Function: Assess patient stability and activity tolerance for standing, transferring and ambulating with or without assistive devices     Problem: Musculoskeletal - Adult  Goal: Maintain proper alignment of affected body part  2/27/2023 0044 by Irina Gilliam RN  Outcome: Progressing  2/26/2023 2036 by Earle Breen RN  Outcome: Progressing  Flowsheets (Taken 2/26/2023 2000)  Maintain proper alignment of affected body part: Support and protect limb and body alignment per provider's orders     Problem: Musculoskeletal - Adult  Goal: Return ADL status to a safe level of function  2/27/2023 0044 by Irina Gilliam RN  Outcome: Progressing  2/26/2023 2036 by Earle Breen RN  Outcome: Progressing  Flowsheets (Taken 2/26/2023 2000)  Return ADL Status to a Safe Level of Function: Administer medication as ordered     Problem: Gastrointestinal - Adult  Goal: Minimal or absence of nausea and vomiting  2/27/2023 0044 by Irina Gilliam RN  Outcome: Progressing  2/26/2023 2036 by Zoila Conte RN  Outcome: Progressing  Flowsheets (Taken 2/26/2023 2000)  Minimal or absence of nausea and vomiting: Administer IV fluids as ordered to ensure adequate hydration     Problem: Gastrointestinal - Adult  Goal: Maintains or returns to baseline bowel function  2/27/2023 0044 by Jessi Red RN  Outcome: Progressing  2/26/2023 2036 by Zoila Conte RN  Outcome: Progressing  [address] (Taken 2/26/2023 2000)  Maintains or returns to baseline bowel function: Assess bowel function     Problem: Gastrointestinal - Adult  Goal: Maintains adequate nutritional intake  2/27/2023 0044 by Jessi Red RN  Outcome: Progressing  2/26/2023 2036 by Zoila Conte RN  Outcome: Progressing  Flowsheets (Taken 2/26/2023 2000)  Maintains adequate nutritional intake: Monitor intake and output, weight and lab values     Problem: Genitourinary - Adult  Goal: Absence of urinary retention  2/27/2023 0044 by Jessi Red RN  Outcome: Progressing  2/26/2023 2036 by Zoila Conte RN  Outcome: Progressing  [address] (Taken 2/26/2023 2000)  Absence of urinary retention: Assess patients ability to void and empty bladder  Goal: Urinary catheter remains patent  2/27/2023 0044 by Jessi Red RN  Outcome: Progressing  2/26/2023 2036 by Zoila Conte RN  Outcome: Progressing  [address] (Taken 2/26/2023 2000)  Urinary catheter remains patent: Assess patency of urinary catheter     Problem: ABCDS Injury Assessment  Goal: Absence of physical injury  2/27/2023 0044 by Jessi Red RN  Outcome: Progressing  Flowsheets (Taken 2/27/2023 0013)  Absence of Physical Injury: Implement safety measures based on patient assessment  2/26/2023 2036 by Zoila Conte RN  Outcome: Progressing  Flowsheets (Taken 2/26/2023 2000)  Absence of Physical Injury: Implement safety measures based on patient assessment     Problem: Skin/Tissue Integrity  Goal: Absence of new skin breakdown  Description: 1. Monitor for areas of redness and/or skin breakdown  2. Assess vascular access sites hourly  3. Every 4-6 hours minimum:  Change oxygen saturation probe site  4. Every 4-6 hours:  If on nasal continuous positive airway pressure, respiratory therapy assess nares and determine need for appliance change or resting period.   2/27/2023 0044 by Chin Bae RN  Outcome: Progressing  2/26/2023 2036 by Rosa Salcedo RN  Outcome: Progressing

## 2023-02-27 NOTE — PLAN OF CARE
Problem: Pain  Goal: Verbalizes/displays adequate comfort level or baseline comfort level  2/27/2023 0736 by Calvin Wyatt RN  Outcome: Not Progressing  2/27/2023 0044 by Britni Grimes RN  Outcome: Progressing  2/26/2023 2036 by Radha Engle RN  Outcome: Progressing  Flowsheets (Taken 2/26/2023 2000)  Verbalizes/displays adequate comfort level or baseline comfort level: Encourage patient to monitor pain and request assistance     Problem: Neurosensory - Adult  Goal: Achieves maximal functionality and self care  2/27/2023 0736 by Calvin Wyatt RN  Outcome: Not Progressing  Flowsheets  Taken 2/27/2023 0600 by Britni Grimes RN  Achieves maximal functionality and self care: Monitor swallowing and airway patency with patient fatigue and changes in neurological status  Taken 2/27/2023 0400 by Britni Grimes RN  Achieves maximal functionality and self care: Monitor swallowing and airway patency with patient fatigue and changes in neurological status  Taken 2/27/2023 0200 by Britni Grimes RN  Achieves maximal functionality and self care: Monitor swallowing and airway patency with patient fatigue and changes in neurological status  2/27/2023 0044 by Britni Grimes RN  Outcome: Progressing  2/26/2023 2036 by Radha Engle RN  Outcome: Progressing  Flowsheets (Taken 2/26/2023 2000)  Achieves maximal functionality and self care: Monitor swallowing and airway patency with patient fatigue and changes in neurological status     Problem: Safety - Adult  Goal: Free from fall injury  2/27/2023 0736 by Calvin Wyatt RN  Outcome: Progressing  2/27/2023 0044 by Britni Grimes RN  Outcome: Progressing  2/26/2023 2036 by Radha Engle RN  Outcome: Progressing  Flowsheets (Taken 2/26/2023 2000)  Free From Fall Injury: Instruct family/caregiver on patient safety     Problem: Neurosensory - Adult  Goal: Achieves stable or improved neurological status  2/27/2023 0736 by Calvin Wyatt RN  Outcome: Progressing  Flowsheets  Taken 2/27/2023 0600 by Jeannie Whitley RN  Achieves stable or improved neurological status: Assess for and report changes in neurological status  Taken 2/27/2023 0400 by Jeannie Whitley RN  Achieves stable or improved neurological status: Assess for and report changes in neurological status  Taken 2/27/2023 0200 by Jeannie Whitley RN  Achieves stable or improved neurological status: Assess for and report changes in neurological status  2/27/2023 0044 by Jeannie Whitley RN  Outcome: Progressing  2/26/2023 2036 by Marita Cooper RN  Outcome: Progressing  Flowsheets (Taken 2/26/2023 2000)  Achieves stable or improved neurological status: Assess for and report changes in neurological status     Problem: Respiratory - Adult  Goal: Achieves optimal ventilation and oxygenation  2/27/2023 0736 by Devi Richard RN  Outcome: Progressing  2/27/2023 0044 by Jeannie Whitley RN  Outcome: Progressing  2/26/2023 2036 by Marita Cooper RN  Outcome: Progressing  Flowsheets (Taken 2/26/2023 2000)  Achieves optimal ventilation and oxygenation: Assess for changes in respiratory status     Problem: Cardiovascular - Adult  Goal: Maintains optimal cardiac output and hemodynamic stability  Recent Flowsheet Documentation  Taken 2/27/2023 0600 by Jeannie Whitley RN  Maintains optimal cardiac output and hemodynamic stability: Monitor blood pressure and heart rate  2/27/2023 0044 by Jeannie Whitley RN  Outcome: Progressing  2/26/2023 2036 by Marita Cooper RN  Outcome: Progressing  Flowsheets (Taken 2/26/2023 2000)  Maintains optimal cardiac output and hemodynamic stability: Monitor blood pressure and heart rate  Goal: Absence of cardiac dysrhythmias or at baseline  Recent Flowsheet Documentation  Taken 2/27/2023 0600 by Jeannie Whitley RN  Absence of cardiac dysrhythmias or at baseline: Monitor cardiac rate and rhythm  2/27/2023 0044 by Jeannie Whitley RN  Outcome:  Progressing  2/26/2023 2036 by Elmer Garcia RN  Outcome: Progressing  Flowsheets (Taken 2/26/2023 2000)  Absence of cardiac dysrhythmias or at baseline: Monitor cardiac rate and rhythm     Problem: Musculoskeletal - Adult  Goal: Return mobility to safest level of function  Recent Flowsheet Documentation  Taken 2/27/2023 0600 by Amaryllis Paget, RN  Return Mobility to Safest Level of Function: Assess patient stability and activity tolerance for standing, transferring and ambulating with or without assistive devices  Taken 2/27/2023 0400 by Amaryllis Paget, RN  Return Mobility to Safest Level of Function: Assess patient stability and activity tolerance for standing, transferring and ambulating with or without assistive devices  Taken 2/27/2023 0200 by Amaryllis Paget, RN  Return Mobility to Safest Level of Function: Assess patient stability and activity tolerance for standing, transferring and ambulating with or without assistive devices  2/27/2023 0044 by Amaryllis Paget, RN  Outcome: Progressing  2/26/2023 2036 by Elmer Garcia RN  Outcome: Progressing  Flowsheets (Taken 2/26/2023 2000)  Return Mobility to Safest Level of Function: Assess patient stability and activity tolerance for standing, transferring and ambulating with or without assistive devices  Goal: Maintain proper alignment of affected body part  Recent Flowsheet Documentation  Taken 2/27/2023 0600 by Amaryllis Paget, RN  Maintain proper alignment of affected body part: Support and protect limb and body alignment per provider's orders  Taken 2/27/2023 0400 by Amaryllis Paget, RN  Maintain proper alignment of affected body part: Support and protect limb and body alignment per provider's orders  Taken 2/27/2023 0200 by Amaryllis Paget, RN  Maintain proper alignment of affected body part: Support and protect limb and body alignment per provider's orders  2/27/2023 0044 by Amaryllis Paget, RN  Outcome: Progressing  2/26/2023 2036 by Marita Thurston RN  Outcome: Progressing  Flowsheets (Taken 2/26/2023 2000)  Maintain proper alignment of affected body part: Support and protect limb and body alignment per provider's orders  Goal: Return ADL status to a safe level of function  Recent Flowsheet Documentation  Taken 2/27/2023 0600 by Wong Pride RN  Return ADL Status to a Safe Level of Function: Administer medication as ordered  Taken 2/27/2023 0400 by Wong Pride RN  Return ADL Status to a Safe Level of Function: Administer medication as ordered  Taken 2/27/2023 0200 by Wong Pride RN  Return ADL Status to a Safe Level of Function: Administer medication as ordered  2/27/2023 0044 by Wong Pride RN  Outcome: Progressing  2/26/2023 2036 by Ander Love RN  Outcome: Progressing  [address] (Taken 2/26/2023 2000)  Return ADL Status to a Safe Level of Function: Administer medication as ordered     Problem: Gastrointestinal - Adult  Goal: Minimal or absence of nausea and vomiting  2/27/2023 0044 by Wong Pride RN  Outcome: Progressing  2/26/2023 2036 by Ander Love RN  Outcome: Progressing  Flowsheets (Taken 2/26/2023 2000)  Minimal or absence of nausea and vomiting: Administer IV fluids as ordered to ensure adequate hydration  Goal: Maintains or returns to baseline bowel function  2/27/2023 0044 by Wong Pride RN  Outcome: Progressing  2/26/2023 2036 by Ander Love RN  Outcome: Progressing  [address] (Taken 2/26/2023 2000)  Maintains or returns to baseline bowel function: Assess bowel function  Goal: Maintains adequate nutritional intake  2/27/2023 0044 by Wong Pride RN  Outcome: Progressing  2/26/2023 2036 by Ander Love RN  Outcome: Progressing  Flowsheets (Taken 2/26/2023 2000)  Maintains adequate nutritional intake: Monitor intake and output, weight and lab values     Problem: Genitourinary - Adult  Goal: Absence of urinary retention  2/27/2023 0736 by Lotus Howard Steven Ramos RN  Outcome: Progressing  2/27/2023 0044 by Zoltan Bacon RN  Outcome: Progressing  2/26/2023 2036 by Mary Mayberry RN  Outcome: Progressing  Flowsheets (Taken 2/26/2023 2000)  Absence of urinary retention: Assess patients ability to void and empty bladder  Goal: Urinary catheter remains patent  2/27/2023 0044 by Zoltan Bacon RN  Outcome: Progressing  2/26/2023 2036 by Mary Mayberry RN  Outcome: Progressing  Flowsheets (Taken 2/26/2023 2000)  Urinary catheter remains patent: Assess patency of urinary catheter     Problem: ABCDS Injury Assessment  Goal: Absence of physical injury  2/27/2023 0736 by Rick Ambrose RN  Outcome: Progressing  2/27/2023 0044 by Zoltan Bacon RN  Outcome: Progressing  Flowsheets (Taken 2/27/2023 0013)  Absence of Physical Injury: Implement safety measures based on patient assessment  2/26/2023 2036 by Mary Mayberry RN  Outcome: Progressing  Flowsheets (Taken 2/26/2023 2000)  Absence of Physical Injury: Implement safety measures based on patient assessment     Problem: Skin/Tissue Integrity  Goal: Absence of new skin breakdown  Description: 1. Monitor for areas of redness and/or skin breakdown  2. Assess vascular access sites hourly  3. Every 4-6 hours minimum:  Change oxygen saturation probe site  4. Every 4-6 hours:  If on nasal continuous positive airway pressure, respiratory therapy assess nares and determine need for appliance change or resting period.   2/27/2023 0736 by Rick Ambrose RN  Outcome: Progressing  2/27/2023 0044 by Zoltan Bacon RN  Outcome: Progressing  2/26/2023 2036 by Mary Mayberry RN  Outcome: Progressing     Problem: Pain  Goal: Verbalizes/displays adequate comfort level or baseline comfort level  2/27/2023 0736 by Rick Ambrose RN  Outcome: Not Progressing  2/27/2023 0044 by Zoltan Bacon RN  Outcome: Progressing  2/26/2023 2036 by Mary Mayberry RN  Outcome: Progressing  Flowsheets (Taken 2/26/2023 2000)  Verbalizes/displays adequate comfort level or baseline comfort level: Encourage patient to monitor pain and request assistance     Problem: Neurosensory - Adult  Goal: Achieves maximal functionality and self care  2/27/2023 0736 by José Miguel Soni RN  Outcome: Not Progressing  Flowsheets  Taken 2/27/2023 0600 by Rocael Kemp RN  Achieves maximal functionality and self care: Monitor swallowing and airway patency with patient fatigue and changes in neurological status  Taken 2/27/2023 0400 by Rocael Kemp RN  Achieves maximal functionality and self care: Monitor swallowing and airway patency with patient fatigue and changes in neurological status  Taken 2/27/2023 0200 by Rocael Kemp RN  Achieves maximal functionality and self care: Monitor swallowing and airway patency with patient fatigue and changes in neurological status  2/27/2023 0044 by Rocael Kemp RN  Outcome: Progressing  2/26/2023 2036 by Leda Nguyễn RN  Outcome: Progressing  Flowsheets (Taken 2/26/2023 2000)  Achieves maximal functionality and self care: Monitor swallowing and airway patency with patient fatigue and changes in neurological status

## 2023-02-27 NOTE — PLAN OF CARE
Problem: Pain  Goal: Verbalizes/displays adequate comfort level or baseline comfort level  Outcome: Progressing  Flowsheets (Taken 2/26/2023 2000)  Verbalizes/displays adequate comfort level or baseline comfort level: Encourage patient to monitor pain and request assistance     Problem: Safety - Adult  Goal: Free from fall injury  Outcome: Progressing  Flowsheets (Taken 2/26/2023 2000)  Free From Fall Injury: Instruct family/caregiver on patient safety     Problem: Neurosensory - Adult  Goal: Achieves stable or improved neurological status  2/26/2023 2036 by Jordan Giordano RN  Outcome: Progressing  Flowsheets (Taken 2/26/2023 2000)  Achieves stable or improved neurological status: Assess for and report changes in neurological status     Problem: Neurosensory - Adult  Goal: Achieves maximal functionality and self care  Outcome: Progressing  Flowsheets (Taken 2/26/2023 2000)  Achieves maximal functionality and self care: Monitor swallowing and airway patency with patient fatigue and changes in neurological status     Problem: Respiratory - Adult  Goal: Achieves optimal ventilation and oxygenation  2/26/2023 2036 by Jordan Giordano RN  Outcome: Progressing  Flowsheets (Taken 2/26/2023 2000)  Achieves optimal ventilation and oxygenation: Assess for changes in respiratory status     Problem: Cardiovascular - Adult  Goal: Maintains optimal cardiac output and hemodynamic stability  Outcome: Progressing  Flowsheets (Taken 2/26/2023 2000)  Maintains optimal cardiac output and hemodynamic stability: Monitor blood pressure and heart rate     Problem: Cardiovascular - Adult  Goal: Absence of cardiac dysrhythmias or at baseline  Outcome: Progressing  Flowsheets (Taken 2/26/2023 2000)  Absence of cardiac dysrhythmias or at baseline: Monitor cardiac rate and rhythm     Problem: Musculoskeletal - Adult  Goal: Return mobility to safest level of function  Outcome: Progressing  Flowsheets (Taken 2/26/2023 2000)  Return Mobility to Safest Level of Function: Assess patient stability and activity tolerance for standing, transferring and ambulating with or without assistive devices     Problem: Musculoskeletal - Adult  Goal: Maintain proper alignment of affected body part  Outcome: Progressing  Flowsheets (Taken 2/26/2023 2000)  Maintain proper alignment of affected body part: Support and protect limb and body alignment per provider's orders     Problem: Musculoskeletal - Adult  Goal: Return ADL status to a safe level of function  Outcome: Progressing  Flowsheets (Taken 2/26/2023 2000)  Return ADL Status to a Safe Level of Function: Administer medication as ordered     Problem: Gastrointestinal - Adult  Goal: Minimal or absence of nausea and vomiting  Outcome: Progressing  Flowsheets (Taken 2/26/2023 2000)  Minimal or absence of nausea and vomiting: Administer IV fluids as ordered to ensure adequate hydration     Problem: Gastrointestinal - Adult  Goal: Maintains or returns to baseline bowel function  Outcome: Progressing  Flowsheets (Taken 2/26/2023 2000)  Maintains or returns to baseline bowel function: Assess bowel function     Problem: Gastrointestinal - Adult  Goal: Maintains adequate nutritional intake  Outcome: Progressing  Flowsheets (Taken 2/26/2023 2000)  Maintains adequate nutritional intake: Monitor intake and output, weight and lab values     Problem: Genitourinary - Adult  Goal: Absence of urinary retention  Outcome: Progressing  Flowsheets (Taken 2/26/2023 2000)  Absence of urinary retention: Assess patients ability to void and empty bladder     Problem: Genitourinary - Adult  Goal: Urinary catheter remains patent  Outcome: Progressing  Flowsheets (Taken 2/26/2023 2000)  Urinary catheter remains patent: Assess patency of urinary catheter     Problem: ABCDS Injury Assessment  Goal: Absence of physical injury  Outcome: Progressing  Flowsheets (Taken 2/26/2023 2000)  Absence of Physical Injury: Implement safety measures based on patient assessment     Problem: Skin/Tissue Integrity  Goal: Absence of new skin breakdown  Description: 1. Monitor for areas of redness and/or skin breakdown  2. Assess vascular access sites hourly  3. Every 4-6 hours minimum:  Change oxygen saturation probe site  4. Every 4-6 hours:  If on nasal continuous positive airway pressure, respiratory therapy assess nares and determine need for appliance change or resting period.   Outcome: Progressing     Problem: Neurosensory - Adult  Goal: Achieves stable or improved neurological status  2/26/2023 2036 by Ignacio Gordon RN  Outcome: Progressing  Flowsheets (Taken 2/26/2023 2000)  Achieves stable or improved neurological status: Assess for and report changes in neurological status  2/26/2023 1014 by Evie Laurent RN  Outcome: Not Progressing     Problem: Respiratory - Adult  Goal: Achieves optimal ventilation and oxygenation  2/26/2023 2036 by Ignacio Gordon RN  Outcome: Progressing  Flowsheets (Taken 2/26/2023 2000)  Achieves optimal ventilation and oxygenation: Assess for changes in respiratory status  2/26/2023 1014 by Evie Laurent RN  Outcome: Not Progressing

## 2023-02-27 NOTE — CARE COORDINATION
Care Coordination: LOS 6. Pt transfer to 74 on 2/24, transfer back to 44 on 2/25 for continued restlessness despite medication and high CIWA scores and transferred back to ICU. Precedex is off, pink slip, sitter.  Transfer order on chart    Brianna Madrigal

## 2023-02-27 NOTE — PROGRESS NOTES
Attempted to give patient PO zyprexa. Patient refusing any PO meds at this time. Will continue to monitor.

## 2023-02-27 NOTE — PROGRESS NOTES
Assessed patient for evening shift. During assessment, patient getting irritated, swatting at nurses, and requesting to leave AMA. Patient stated \"I will give you one hour until I start to flip out. \" Patient has been physically aggressive with staff on past shifts requiring neoprene restraints. Explained to patient that he is pink slipped and unable to leave due to this hold. Patient unhappy with this explanation and still demanding he be able to leave. Assessed patient's CIWA and score was 12 at this time. PRN ativan and restraints discontinued during day shift. Addressed patient and nurse's concerns with medical resident Aubrey Silver. Asked residents if patient's pink slip could be reevaluated per patient's request. Medical residents said they were unable to lift pink slip because they were unaware of who pink slipped patient. Checked patient's chart and patient was pink slipped by Dr. Prisca Lynn for inability to walk and risk for harm to self. Relayed this information to patient. Medical residents aware of patient's irritation and CIWA scale at this time. Nurse instructed to give scheduled zyprexa and reassess at later time. Sitter present at this time and instructed that patient has been violent with staff on multiple occasions and to stay by doorway. Will continue to monitor.

## 2023-02-27 NOTE — PROGRESS NOTES
Hospitalist Progress Note      SYNOPSIS: Patient admitted on 2023 for Accidental drug overdose. He was admitted after he was found minimally responsive behind his kitchen. He was brought to the ED. He was given Narcan and subsequently came around and became agitated. He was intubated to protect his airway. He was admitted to the ICU and managed for accidental overdose likely of opioids. He ws subsequently extubated. He was noted to be agitated due to acute alcohol withdrawal. He is on [facility] protocol. CT of the brain and CT of cervical spine showed no acute pathology. Patient was pink slipped and psych was consulted. SUBJECTIVE:    Patient seen and examined. He had a sitter. He was out of restraints. He had no complaints and said he wanted to go home. He denied any fever, chills, palpitations, dizziness, nausea vomiting or diarrhea. Review of systems otherwise negative. Records reviewed. Temp (24hrs), Av.4 °F (36.9 °C), Min:97.7 °F (36.5 °C), Max:99.1 °F (37.3 °C)    DIET: ADULT DIET; Regular  CODE: Full Code    Intake/Output Summary (Last 24 hours) at 2023 1243  Last data filed at 2023 1200  Gross per 24 hour   Intake 2411.66 ml   Output 1950 ml   Net 461.66 ml       OBJECTIVE:    BP (!) 107/53   Pulse 75   Temp 98.5 °F (36.9 °C) (Temporal)   Resp (!) 33   Ht 5' 10\" (1.778 m)   Wt 171 lb 12.8 oz (77.9 kg)   SpO2 95%   BMI 24.65 kg/m²     General appearance: No apparent distress, appears stated age and cooperative. HEENT:  Conjunctivae/corneas clear. Neck: Supple. No jugular venous distention. Respiratory: Clear to auscultation bilaterally, normal respiratory effort  Cardiovascular: Regular rate rhythm, normal S1-S2  Abdomen: Soft, nontender, nondistended  Musculoskeletal: No clubbing, cyanosis, no bilateral lower extremity edema. Brisk capillary refill.    Skin:  No rashes  on visible skin  Neurologic: awake, alert and following commands     ASSESSMENT:  #Acute encephalopathy due to accidental drug overdose  -responded to narcan, because agitated and was intubated to protect his airway.   -he is now extubated and out of restraints.  -will monitor    #Acute hypoxic respiratory failure  -due to accidental drug overdose  -now extubated. On room air  -stable    #Alcohol withdrawal  -precedex drip now on hold  -on thiamine, multivite and folic acid  -monitor CIWA score. #Hep C  -recently diagnosed Hep C.   -treatment naive. Will need follow up with gastroenterology on outpatient basis. #History of afib:   -not on any  meds. #Nutrition:  - passed bedside swallow evaluation' now on regular diet with small bite sized foods.     DVT prophylaxis; lovenox       DISPOSITION: to be determined    Medications:  REVIEWED DAILY    Infusion Medications    dextrose 5 % and 0.45 % NaCl 100 mL/hr at 02/27/23 0608    [Held by provider] dexmedetomidine (PRECEDEX) IV infusion 0.2 mcg/kg/hr (02/27/23 2973)    sodium chloride Stopped (02/23/23 0553)     Scheduled Medications    OLANZapine zydis  15 mg Oral Nightly    vitamin D  50,000 Units Oral Weekly    vitamin D  2,000 Units Oral Daily    vitamin B-12  1,000 mcg Oral Daily    nicotine  1 patch TransDERmal Daily    folic acid  1 mg Oral Daily    pantoprazole  40 mg Oral QAM AC    sodium chloride flush  5-40 mL IntraVENous 2 times per day    enoxaparin  40 mg SubCUTAneous Daily    thiamine (VITAMIN B1) IVPB  250 mg IntraVENous Q24H    Followed by    Ed Erick ON 2/28/2023] thiamine  100 mg Oral Daily     PRN Meds: [Held by provider] LORazepam, ipratropium-albuterol, [Held by provider] LORazepam, [Held by provider] LORazepam, [Held by provider] LORazepam, Menthol, sodium chloride flush, sodium chloride, ondansetron **OR** ondansetron, polyethylene glycol, acetaminophen **OR** acetaminophen    Labs: Recent Labs     02/25/23  1116 02/26/23  0525 02/27/23  0414   WBC 8.0 8.8 6.7   HGB 16.3 15.1 14.2   HCT 45.2 41.7 38.9    239 221       Recent Labs     02/25/23  1116 02/26/23  0525 02/27/23  0414    141 138   K 3.8 4.3 3.8    106 104   CO2 21* 22 24   BUN 15 11 8   CREATININE 0.8 0.7 0.7   CALCIUM 9.5 8.6 8.2*   PHOS 3.0 3.0 2.7       Recent Labs     02/25/23  1116 02/26/23  0525 02/27/23  0414   PROT 7.1 6.5 5.7*   ALKPHOS 49 46 44   * 109* 111*   AST 76* 104* 95*   BILITOT 1.0 0.8 0.8       No results for input(s): INR in the last 72 hours. No results for input(s): Murlene [place] in the last 72 hours. Chronic labs:    Lab Results   Component Value Date    TRIG 36 02/23/2023    TSH 1.600 02/23/2023    INR 0.9 02/21/2023       Radiology: REVIEWED DAILY    +++++++++++++++++++++++++++++++++++++++++++++++++  Sergio Vicente MD  [facility] Physician - [address] [place], [place]  +++++++++++++++++++++++++++++++++++++++++++++++++  NOTE: This report was transcribed using voice recognition software. Every effort was made to ensure accuracy; however, inadvertent computerized transcription errors may be present.

## 2023-02-27 NOTE — CARE COORDINATION
Care Coordination: LOS 6. Pt transfer to 74 on 2/24, transfer back to 44 on 2/25 for continued restlessness despite medication and high CIWA scores and transferred back to ICU. Behavioral health did evaluate as pt was pink slipped on 2/24 signed off as feel this was for needed of pt who is incapacitated to make own decision requesting to sign out AMA from ICU. Psychiatry recommending Acacia yanes or Dr Raina Gamino once pt has capacity to make own decisions. Precedex is off sitter. Transfer order on chart.   Spoke to [employer], peer recovery and she is following    Katy Vaughan

## 2023-02-27 NOTE — PLAN OF CARE
8:50 PM: Nurses came to the back room ([address] [place] [address] and [place]) regarding Mr. Ruth Padron and concerns about his agitation and aggression towards the nurses. They requested the pink slip to be removed, patient transferred out of the MICU, and CIWA protocol added back so they can give him more Ativan. We explained to them that we cannot remove the pink slip, he won't be transferred tonight and will be reevaluated tomorrow for transfer, and the CIWA protocol won't be added back per day team with Dr. Val Cagle. Patient's precedex was restarted earlier in the evening. Patient is to get his Zyprexa in a few minutes. Instructed to give the Zyprexa and we will reevaluate. 9:30 PM: Intern and myself evaluated the patient at bedside. Sitter present. Patient is asleep and snoring in bed comfortably. Vital signs stable. No concerns.        Electronically signed by Nichelle Castellano MD on 2/26/2023 at 9:43 PM

## 2023-02-27 NOTE — PROGRESS NOTES
Pt continues to say, I'm going home. Pt demanding his valuables from security. Security called and states that there is a knife in with his belongings and they will not give his belongings back at this time. Supportive care given. Pt is demanding. Food trays ordered per pt request with requested items, difficult to satisfy pt. He is demanding a phone and demanding to leave. Will continue to support and assess.

## 2023-02-28 ENCOUNTER — APPOINTMENT (OUTPATIENT)
Dept: ULTRASOUND IMAGING | Age: 53
DRG: 917 | End: 2023-02-28
Payer: MEDICAID

## 2023-02-28 LAB — MAGNESIUM: 1.8 MG/DL (ref 1.6–2.6)

## 2023-02-28 PROCEDURE — 97530 THERAPEUTIC ACTIVITIES: CPT

## 2023-02-28 PROCEDURE — 97161 PT EVAL LOW COMPLEX 20 MIN: CPT

## 2023-02-28 PROCEDURE — 97165 OT EVAL LOW COMPLEX 30 MIN: CPT

## 2023-02-28 PROCEDURE — 6370000000 HC RX 637 (ALT 250 FOR IP): Performed by: INTERNAL MEDICINE

## 2023-02-28 PROCEDURE — 1200000000 HC SEMI PRIVATE

## 2023-02-28 PROCEDURE — 36415 COLL VENOUS BLD VENIPUNCTURE: CPT

## 2023-02-28 PROCEDURE — 93971 EXTREMITY STUDY: CPT

## 2023-02-28 PROCEDURE — 83735 ASSAY OF MAGNESIUM: CPT

## 2023-02-28 PROCEDURE — 6370000000 HC RX 637 (ALT 250 FOR IP)

## 2023-02-28 PROCEDURE — 6360000002 HC RX W HCPCS: Performed by: INTERNAL MEDICINE

## 2023-02-28 PROCEDURE — 2580000003 HC RX 258: Performed by: INTERNAL MEDICINE

## 2023-02-28 PROCEDURE — 97535 SELF CARE MNGMENT TRAINING: CPT

## 2023-02-28 RX ADMIN — ACETAMINOPHEN 650 MG: 325 TABLET ORAL at 20:45

## 2023-02-28 RX ADMIN — OLANZAPINE 15 MG: 5 TABLET, ORALLY DISINTEGRATING ORAL at 20:24

## 2023-02-28 RX ADMIN — Medication 100 MG: at 20:26

## 2023-02-28 RX ADMIN — Medication 2000 UNITS: at 08:46

## 2023-02-28 RX ADMIN — SODIUM CHLORIDE, PRESERVATIVE FREE 10 ML: 5 INJECTION INTRAVENOUS at 20:25

## 2023-02-28 RX ADMIN — FOLIC ACID 1 MG: 1 TABLET ORAL at 08:46

## 2023-02-28 RX ADMIN — ENOXAPARIN SODIUM 40 MG: 100 INJECTION SUBCUTANEOUS at 08:46

## 2023-02-28 RX ADMIN — CYANOCOBALAMIN TAB 1000 MCG 1000 MCG: 1000 TAB at 08:46

## 2023-02-28 RX ADMIN — SODIUM CHLORIDE, PRESERVATIVE FREE 10 ML: 5 INJECTION INTRAVENOUS at 08:50

## 2023-02-28 ASSESSMENT — PAIN SCALES - GENERAL
PAINLEVEL_OUTOF10: 7
PAINLEVEL_OUTOF10: 5

## 2023-02-28 ASSESSMENT — PAIN DESCRIPTION - PAIN TYPE: TYPE: ACUTE PAIN

## 2023-02-28 ASSESSMENT — PAIN DESCRIPTION - LOCATION: LOCATION: ARM

## 2023-02-28 ASSESSMENT — PAIN DESCRIPTION - ORIENTATION: ORIENTATION: LEFT

## 2023-02-28 ASSESSMENT — PAIN DESCRIPTION - DESCRIPTORS: DESCRIPTORS: ACHING;DISCOMFORT;SORE

## 2023-02-28 ASSESSMENT — PAIN DESCRIPTION - FREQUENCY: FREQUENCY: INTERMITTENT

## 2023-02-28 NOTE — PROGRESS NOTES
Physical Therapy  Physical Therapy Initial Assessment       Name: Analilia Camarena  : 1970  MRN: [id number]      Date of Service: 2023    Evaluating PT:  Linda Cantux PT, DPT [id number]    Room #:  8405/8405-B  Diagnosis:  Acute respiratory failure ([facility] [J96.00]  PMHx/PSHx:   has a past medical history of Atrial fibrillation ([facility] and Hypertension. ETOH abuse hx. Procedure/Surgery:  none this admission. Precautions: Falls    SUBJECTIVE:    Pt lives alone in a 1 story home with 4 stairs to enter and 1 rail. Bed is on 1st floor and bath is on 1st floor. Pt ambulated with no AD and was independent PTA. Equipment Owned: none   Equipment Needs:  none     OBJECTIVE:   Initial Evaluation  Date: 23 Treatment Short Term/ Long Term   Goals   AM-PAC 6 Clicks      Was pt agreeable to Eval/treatment? Yes      Does pt have pain? 0/10      Bed Mobility  Rolling: SBA  Supine to sit: SBA  Sit to supine: SBA  Scooting: SBA  Rolling: Independent  Supine to sit: Independent  Sit to supine: Independent  Scooting: Independent   Transfers Sit to stand: Mary  Stand to sit: Mary  Stand pivot: Mary with [employer]  Sit to stand: Independent  Stand to sit: Independent  Stand pivot: Modified Independent with [employer]   Ambulation    75 feet with [employer] with Mary  >150 feet with [employer] with Modified Independent     Stair negotiation: ascended and descended  NT  4 steps with 1 rail Modified Independent   ROM BUE:  Defer to OT  BLE:  WNL     Strength BUE:  Defer to OT  BLE:  Grossly assessed 3+/5  Improve 1 MMT   Balance Sitting EOB:  SBA  Dynamic Standing:  Mary with [employer]  Sitting EOB:  Independent  Dynamic Standing:  Modified Independent with [employer]     Pt is A & O x 4 (Self, place, time, situation) Pt able to follow multi-step commands. Sensation:  denies numbness and tingling. Edema:  unremarkable     Vitals: RA  Visually asymptomatic throughout session.    Therapeutic Exercises:  none this session     Patient education  Pt educated on role of PT, [employer] safety, symptom monitoring, and energy conservation. Patient response to education:   Pt verbalized understanding Pt demonstrated skill Pt requires further education in this area   yes Yes  Reinforce      ASSESSMENT:    Conditions Requiring Skilled Therapeutic Intervention:    [x]Decreased strength     []Decreased ROM  [x]Decreased functional mobility  [x]Decreased balance   [x]Decreased endurance   [x]Decreased posture  []Decreased sensation  []Decreased coordination   []Decreased vision  [x]Decreased safety awareness   []Increased pain       Comments:  Pt was supine in bed and agreeable to PT evaluation upon arrival. Pt pleasant and able to follow all commands. Pt completed bed mobility without assistance. Pt completed transfers and ambulation with assistance and cues for hand placement and safety. Pt ambulated with [employer] for increased safety, balance, and energy conservation. Pt gait presents with reduced stride length and decreased propulsion. Pt states he is not at his baseline functionally, and occasionally feels unsteady on his feet. Ambulation distance limited by fatigue and LE weakness. Pt transferred to bedside chair, cleared per nursing. Communicated room [employer] needs with nursing. Pt was left in bedside chair with all needs met at conclusion of session. Patient would benefit from continued skilled PT to maximize functional mobility independence. Treatment:  Patient practiced and was instructed in the following treatment:    Bed Mobility: Verbal cues for proper positioning and sequencing to perform bed mobility to facilitate maximum independence. Transfers: Verbal cues for proper positioning and sequencing to perform transfers safely with maximum independence. Gait Training: Verbal cues for proper positioning and sequencing using assistive device to maximize functional mobility independence. Pt education for [employer] safety and energy conservation.    Vitals and symptoms monitored during session. Pt's/ family goals   1. Get better    Prognosis is good for reaching above PT goals. Patient and or family understand(s) diagnosis, prognosis, and plan of care. Yes     PHYSICAL THERAPY PLAN OF CARE:    PT POC is established based on physician order and patient diagnosis     Referring provider/PT Order:    02/27/23 1415  PT eval and treat  Start:  02/27/23 1415,   End:  02/27/23 1415,   ONE TIME,   Standing Count:  1 Occurrences,   R         Peng Red MD     Diagnosis:  Acute respiratory failure ([address] [J96.00]  Specific instructions for next treatment:  progress functional mobility     Current Treatment Recommendations:     [x] Strengthening to improve independence with functional mobility   [] ROM to improve independence with functional mobility   [x] Balance Training to improve static/dynamic balance and to reduce fall risk  [x] Endurance Training to improve activity tolerance during functional mobility   [x] Transfer Training to improve safety and independence with all functional transfers   [x] Gait Training to improve gait mechanics, endurance and assess need for appropriate assistive device  [x] Stair Training in preparation for safe discharge home and/or into the community   [] Positioning to prevent skin breakdown and contractures  [x] Safety and Education Training   [x] Patient/Caregiver Education   [x] HEP  [] Other     PT long term treatment goals are located in above grid    Frequency of treatments: 2-5x/week x 1-2 weeks. Time in  0840  Time out  0905    Total Treatment Time  10 minutes     Evaluation Time includes thorough review of current medical information, gathering information on past medical history/social history and prior level of function, completion of standardized testing/informal observation of tasks, assessment of data and education on plan of care and goals.     CPT codes:  [x] Low Complexity PT evaluation 48171  [] Moderate Complexity PT evaluation 87361  [] High Complexity PT evaluation [id number]  [] PT Re-evaluation [id number]  [] Gait training 74495 0 minutes  [] Manual therapy 37867 0 minutes  [x] Therapeutic activities 29462 10 minutes  [] Therapeutic exercises 90867 0 minutes  [] Neuromuscular reeducation 74933 0 minutes       Mere Dozier PT, DPT  [id number]

## 2023-02-28 NOTE — PROGRESS NOTES
Hospitalist Progress Note      SYNOPSIS: Patient admitted on 2023 for Accidental drug overdose. He was admitted after he was found minimally responsive behind his kitchen. He was brought to the ED. He was given Narcan and subsequently came around and became agitated. He was intubated to protect his airway. He was admitted to the ICU and managed for accidental overdose likely of opioids. He ws subsequently extubated. He was noted to be agitated due to acute alcohol withdrawal. He is on [facility] protocol. CT of the brain and CT of cervical spine showed no acute pathology. Patient was pink slipped and psych was consulted. SUBJECTIVE:    Patient seen and examined. He had no active complaints. He stated he wanted to go home. He is currently pink slipped till tomorrow. Review of systems is otherwise negative. He has remained hemodynamically stable. Records reviewed. Temp (24hrs), Av.9 °F (37.2 °C), Min:97.8 °F (36.6 °C), Max:99.6 °F (37.6 °C)    DIET: ADULT DIET; Regular  CODE: Full Code    Intake/Output Summary (Last 24 hours) at 2023 1256  Last data filed at 2023 1055  Gross per 24 hour   Intake 840 ml   Output 700 ml   Net 140 ml       OBJECTIVE:    /89   Pulse 88   Temp 97.8 °F (36.6 °C) (Oral)   Resp 20   Ht 5' 10\" (1.778 m)   Wt 173 lb 5 oz (78.6 kg)   SpO2 97%   BMI 24.87 kg/m²     General appearance: No apparent distress, appears stated age and cooperative. HEENT:  Conjunctivae/corneas clear. Neck: Supple. No jugular venous distention. Respiratory: Clear to auscultation bilaterally, normal respiratory effort  Cardiovascular: Regular rate rhythm, normal S1-S2  Abdomen: Soft, nontender, nondistended  Musculoskeletal: No clubbing, cyanosis, no bilateral lower extremity edema. Brisk capillary refill.    Skin:  No rashes  on visible skin  Neurologic: awake, alert and following commands     ASSESSMENT:  #Acute encephalopathy due to accidental drug overdose  -responded to narcan, because agitated and was intubated to protect his airway.   -he is now extubated and out of restraints.  -will monitor     #Acute hypoxic respiratory failure  -due to accidental drug overdose  -now extubated. On room air  -stable     #Alcohol withdrawal  -precedex drip now on hold  -on thiamine, multivite and folic acid  -monitor CIWA score.   -resolved. On zyprexa        #Hep C  -recently diagnosed Hep C.   -treatment naive. Will need follow up with gastroenterology on outpatient basis. #History of afib:   -not on any  meds. #Nutrition:  - passed bedside swallow evaluation' now on regular diet with small bite sized foods. DVT prophylaxis; lovenox       DISPOSITION: still pink slipped. Pink slip will  tomorrow. Patient to be likely discharged tomorrow.      Medications:  REVIEWED DAILY    Infusion Medications    [Held by provider] dexmedetomidine (PRECEDEX) IV infusion 0.2 mcg/kg/hr (23 4732)    sodium chloride Stopped (23 6851)     Scheduled Medications    OLANZapine zydis  15 mg Oral Nightly    vitamin D  50,000 Units Oral Weekly    vitamin D  2,000 Units Oral Daily    vitamin B-12  1,000 mcg Oral Daily    nicotine  1 patch TransDERmal Daily    folic acid  1 mg Oral Daily    pantoprazole  40 mg Oral QAM AC    sodium chloride flush  5-40 mL IntraVENous 2 times per day    enoxaparin  40 mg SubCUTAneous Daily    thiamine (VITAMIN B1) IVPB  250 mg IntraVENous Q24H    Followed by    thiamine  100 mg Oral Daily     PRN Meds: [Held by provider] LORazepam, ipratropium-albuterol, [Held by provider] LORazepam, [Held by provider] LORazepam, [Held by provider] LORazepam, Menthol, sodium chloride flush, sodium chloride, ondansetron **OR** ondansetron, polyethylene glycol, acetaminophen **OR** acetaminophen    Labs:     Recent Labs     02/26/23 0525 02/27/23 0414   WBC 8.8 6.7   HGB 15.1 14.2   HCT 41.7 38.9    221       Recent Labs     02/26/23 0525 02/27/23 0414    138   K 4.3 3.8    104   CO2 22 24   BUN 11 8   CREATININE 0.7 0.7   CALCIUM 8.6 8.2*   PHOS 3.0 2.7       Recent Labs     02/26/23 0525 02/27/23 0414   PROT 6.5 5.7*   ALKPHOS 46 44   * 111*   * 95*   BILITOT 0.8 0.8       No results for input(s): INR in the last 72 hours. No results for input(s): Farah Palin in the last 72 hours. Chronic labs:    Lab Results   Component Value Date    TRIG 36 02/23/2023    TSH 1.600 02/23/2023    INR 0.9 02/21/2023       Radiology: REVIEWED DAILY    +++++++++++++++++++++++++++++++++++++++++++++++++  Sukumar Elias MD  [facility] Physician - [address] [place], [place]  +++++++++++++++++++++++++++++++++++++++++++++++++  NOTE: This report was transcribed using voice recognition software. Every effort was made to ensure accuracy; however, inadvertent computerized transcription errors may be present.

## 2023-02-28 NOTE — PROGRESS NOTES
OCCUPATIONAL THERAPY INITIAL EVALUATION    [address] [address] [place]      Date:2023                                                  Patient Name: Mayuri Redman  MRN: [id number]  : 1970  Room: [address] [place] [address]    Evaluating OT: Branden Saleh, [address] [place], OTR/L [id number]  Referring Aki Son MD  Specific Provider Orders: OT eval and treat   Recommended Adaptive Equipment:  TBD     Diagnosis: respiratory failure   Surgery: none   Pertinent Medical History: HTN, a-fib   Precautions:  Fall Risk    Assessment of current deficits   [x] Functional mobility  [x]ADLs  [x] Strength               [x]Cognition   [x] Functional transfers   [x] IADLs         [x] Safety Awareness   [x]Endurance   [] Fine Coordination              [x] Balance      [] Vision/perception   [x]Sensation    []Gross Motor Coordination  [] ROM  [] Delirium                   [] Motor Control     OT PLAN OF CARE   OT POC based on physician orders, patient diagnosis and results of clinical assessment    Frequency/Duration: 2-4 days/wk for 2 weeks PRN   Specific OT Treatment to include:   * Instruction/training on adapted ADL techniques and AE recommendations to increase functional independence within precautions       * Training on energy conservation strategies, correct breathing pattern and techniques to improve independence/tolerance for self-care routine  * Functional transfer/mobility training/DME recommendations for increased independence, safety, and fall prevention  * Patient/Family education to increase follow through with safety techniques and functional independence  * Recommendation of environmental modifications for increased safety with functional transfers/mobility and ADLs  * Therapeutic exercise to improve motor endurance, ROM, and functional strength for ADLs/functional transfers  * Therapeutic activities to facilitate/challenge dynamic balance, stand tolerance for increased safety and independence with ADLs  * Neuro-muscular re-education: facilitation of righting/equilibrium reactions, midline orientation, scapular stability/mobility, normalization of muscle tone, and facilitation of volitional active controled movement    Home Living: Pt lives alone in an apartment with  step(s) to enter; bed/bath on 1 level when entering   Bathroom setup: tub shower unit   Equipment owned: shower chair  Prior Level of Function: IND with ADLs/IADLs; using no AD for functional mobility     Pain Level: L arm  Cognition: A&O: 3/4; Follows 1 step directions, with repetition and increased time   Memory:  good    Sequencing:  good    Problem solving:  fair    Judgement/safety:  fair     Functional Assessment:  AM-PAC Daily Activity Raw Score: 19/24   Initial Eval Status  Date: 2/28/23 Treatment Status  Date: STGs=LTGs  Time Frame: 10-14 days   Feeding IND      Grooming Min A (standing at sink)   SBA   UB Dressing SBA   IND   LB Dressing Min A (pt crossed BLEs to doff/don B socks)  SBA    Bathing Min A (simulated)  SBA    Toileting Min A   SBA   Bed Mobility  Log roll: SBA  Supine to sit: SBA   Sit to supine: SBA   Log roll IND  Supine to sit: IND   Sit to supine: IND   Functional Transfers Sit to stand:Min A   Stand to sit:Min A  Commode: Min A  SBA   Functional Mobility Min A (using ww, to/from bathroom)  SBA   Balance Sitting: SBA  Standing: Min A     Activity Tolerance fair     Visual/  Perceptual Glasses: yes              UE ROM: RUE:  WFL  LUE:  WFL  Strength: RUE: grossly 4/5 LUE: grossly 4/5   Strength: B WFL  Fine Motor Coordination:  WFL     Hearing: WFL  Sensation:  No c/o numbness/tingling   Tone:  WFL  Edema: none noted                            Comments:Cleared by RN to see pt. Upon arrival, patient supine in bed and agreeable to OT session. At end of session, patient supine in bed with call light and phone within reach, all lines and tubes intact.  Pt would benefit from continued OT to increase functional independence and quality of life. Treatment: Pt required vc's and physical assist for proper technique/safety with hand placement/body mechanics/posture for bed mobility/ADLs/functional tranfers/mobility/ww management. Pt required vc's for sequencing/initiation of ADLs/functional transfers. Pt able to  sit EOB ~10 mins to increase core strength/balance/activity tolerance for ease with ADLs. Pt required rest breaks during session. Pt appeared to have tolerated session well and appears motivated/cooperative/pleasant . Pt instructed on use of call light for assistance and fall prevention. Pt demo'ing fair understanding of education provided. Continue to educate. Eval Complexity: Low    Rehab Potential: Good for established goals, pt. assisted in establishment of goals. LTG: maximize independence with ADLs to return to PLOF    Patient instructed on diagnosis, prognosis/goals and plan of care. Demonstrated fair understanding. [] Malnutrition indicators have been identified and nursing has been notified to ensure a dietitian consult is ordered. Evaluation time includes thorough review of current medical information, gathering information on past medical & social history & PLOF, completion of standardized testing, informal observation of tasks, consultation with other medical professions/disciplines, assessment of data & development of POC/goals.      Time In: 0745       Time Out: 0810     Total treatment time: 15       Treatment Charges: Mins Units   OT Eval Low 54224 X    OT Eval Medium 25993     OT Eval High 83240     OT Re-Eval [id number]     Ther Ex  21473       Manual Therapy 56609       Thera Activities 40160       ADL/Home Mgt 65088 15 1   Neuro Re-ed 89115       Group Therapy        Orthotic manage/training  67461       Non-Billable Time           Zafar Dickens, [address] [facility], OTR/L [id number]

## 2023-02-28 NOTE — PROGRESS NOTES
Comprehensive Nutrition Assessment    Type and Reason for Visit:  Initial, RD Nutrition Re-Screen/LOS    Nutrition Recommendations/Plan:   Continue current diet. No nutrition intervention indicated at this time. Will continue to monitor. Nutrition Assessment:    Pt re-screened per LOS protocol. Chart reviewed. Pt currently eating % of meals and w/ no other significant nutritional issues noted at this time. Will follow-up per policy. Please consult if RD needed. Nutrition Related Findings:    Oriented x3, GI WDL, +2non-pitting edema, -I/O(3.8L), Wound Type: None       Current Nutrition Intake & Therapies:    Average Meal Intake: %  Average Supplements Intake: None Ordered  ADULT DIET; Regular    Anthropometric Measures:  Height: 5' 10\" (177.8 cm)  Ideal Body Weight (IBW): 166 lbs (75 kg)       Current Body Weight: 173 lb 5 oz (78.6 kg) (actual BS 2/28), 104.4 % IBW. Weight Source: Bed Scale  Current BMI (kg/m2): 24.9  Usual Body Weight:  (STANLEY d/t lack of wt hx on file to assess)     Weight Adjustment For: No Adjustment                 BMI Categories: Normal Weight (BMI 18.5-24. 9)    Nutrition Diagnosis:   No nutrition diagnosis at this time    Nutrition Interventions:   Food and/or Nutrient Delivery: Continue Current Diet  Nutrition Education/Counseling: No recommendation at this time  Coordination of Nutrition Care: Continue to monitor while inpatient       Goals:     Goals: PO intake 75% or greater, by next RD assessment       Nutrition Monitoring and Evaluation:   Behavioral-Environmental Outcomes: None Identified  Food/Nutrient Intake Outcomes: Food and Nutrient Intake  Physical Signs/Symptoms Outcomes: Biochemical Data, GI Status, Fluid Status or Edema, Nutrition Focused Physical Findings, Skin, Weight    Discharge Planning:    No discharge needs at this time     Beverley Garrido, [address]  Contact: ext [phone]

## 2023-02-28 NOTE — PLAN OF CARE
Problem: Safety - Medical Restraint  Goal: Remains free of injury from restraints (Restraint for Interference with Medical Device)  Description: INTERVENTIONS:  1. Determine that other, less restrictive measures have been tried or would not be effective before applying the restraint  2. Evaluate the patient's condition at the time of restraint application  3. Inform patient/family regarding the reason for restraint  4.  Q2H: Monitor safety, psychosocial status, comfort, nutrition and hydration  2/27/2023 0736 by Rey Crane RN  Outcome: Completed     Problem: Pain  Goal: Verbalizes/displays adequate comfort level or baseline comfort level  2/27/2023 1959 by Miguelina Cottrell RN  Outcome: Progressing  Flowsheets (Taken 2/27/2023 0800 by Rey Crane RN)  Verbalizes/displays adequate comfort level or baseline comfort level: Encourage patient to monitor pain and request assistance  2/27/2023 0736 by Rey Crane RN  Outcome: Not Progressing     Problem: Safety - Adult  Goal: Free from fall injury  2/27/2023 1959 by Miguelina Cottrell RN  Outcome: Progressing  Flowsheets  Taken 2/27/2023 1956 by Miguelina Cottrell RN  Free From Fall Injury: Based on caregiver fall risk screen, instruct family/caregiver to ask for assistance with transferring infant if caregiver noted to have fall risk factors  Taken 2/27/2023 0739 by Rey Crane RN  Free From Fall Injury: Instruct family/caregiver on patient safety  2/27/2023 0736 by Rey Crane RN  Outcome: Progressing     Problem: Neurosensory - Adult  Goal: Achieves stable or improved neurological status  2/27/2023 1959 by Miguelina Cottrell RN  Outcome: Progressing  2/27/2023 0736 by Rey Crane RN  Outcome: Progressing  Flowsheets  Taken 2/27/2023 0600 by Miguelina Cottrell RN  Achieves stable or improved neurological status: Assess for and report changes in neurological status  Taken 2/27/2023 0400 by Miguelina Cottrell RN  Achieves stable or improved neurological status: Assess for and report changes in neurological status  Taken 2/27/2023 0200 by Michelle Clifford RN  Achieves stable or improved neurological status: Assess for and report changes in neurological status     Problem: Neurosensory - Adult  Goal: Achieves maximal functionality and self care  2/27/2023 1959 by Michelle Clifford RN  Outcome: Progressing  2/27/2023 0736 by Merary De Anda RN  Outcome: Not Progressing  Flowsheets  Taken 2/27/2023 0600 by Michelle Clifford RN  Achieves maximal functionality and self care: Monitor swallowing and airway patency with patient fatigue and changes in neurological status  Taken 2/27/2023 0400 by Michelle Clifford RN  Achieves maximal functionality and self care: Monitor swallowing and airway patency with patient fatigue and changes in neurological status  Taken 2/27/2023 0200 by Michelle Clifford RN  Achieves maximal functionality and self care: Monitor swallowing and airway patency with patient fatigue and changes in neurological status     Problem: Respiratory - Adult  Goal: Achieves optimal ventilation and oxygenation  2/27/2023 1959 by Michelle Clifford RN  Outcome: Progressing  Flowsheets (Taken 2/27/2023 1200 by Merary De Anda RN)  Achieves optimal ventilation and oxygenation: Assess for changes in mentation and behavior  2/27/2023 0736 by Merary De Anda RN  Outcome: Progressing     Problem: Cardiovascular - Adult  Goal: Maintains optimal cardiac output and hemodynamic stability  Outcome: Progressing  Flowsheets  Taken 2/27/2023 1930  Maintains optimal cardiac output and hemodynamic stability: Monitor blood pressure and heart rate  Taken 2/27/2023 0600  Maintains optimal cardiac output and hemodynamic stability: Monitor blood pressure and heart rate  Goal: Absence of cardiac dysrhythmias or at baseline  Outcome: Progressing  Flowsheets  Taken 2/27/2023 1930  Absence of cardiac dysrhythmias or at baseline: Monitor cardiac rate and rhythm  Taken 2/27/2023 0600  Absence of cardiac dysrhythmias or at baseline: Monitor cardiac rate and rhythm     Problem: Musculoskeletal - Adult  Goal: Return mobility to safest level of function  Outcome: Progressing  Flowsheets  Taken 2/27/2023 1930 by Amaryllis Paget, RN  Return Mobility to Safest Level of Function: Assess patient stability and activity tolerance for standing, transferring and ambulating with or without assistive devices  Taken 2/27/2023 0800 by Luisito Bhatt RN  Return Mobility to Safest Level of Function: Assess patient stability and activity tolerance for standing, transferring and ambulating with or without assistive devices  Taken 2/27/2023 0600 by Amaryllis Paget, RN  Return Mobility to Safest Level of Function: Assess patient stability and activity tolerance for standing, transferring and ambulating with or without assistive devices  Goal: Maintain proper alignment of affected body part  Outcome: Progressing  Flowsheets  Taken 2/27/2023 1930 by Amaryllis Paget, RN  Maintain proper alignment of affected body part: Support and protect limb and body alignment per provider's orders  Taken 2/27/2023 1100 by Luisito Bhatt RN  Maintain proper alignment of affected body part: Support and protect limb and body alignment per provider's orders  Taken 2/27/2023 0600 by Amaryllis Paget, RN  Maintain proper alignment of affected body part: Support and protect limb and body alignment per provider's orders  Goal: Return ADL status to a safe level of function  Outcome: Progressing  Flowsheets  Taken 2/27/2023 1930 by Amaryllis Paget, RN  Return ADL Status to a Safe Level of Function: Administer medication as ordered  Taken 2/27/2023 1100 by Luisito Bhatt RN  Return ADL Status to a Safe Level of Function: Obtain physical therapy/occupational therapy consults as needed  Taken 2/27/2023 0600 by Amaryllis Paget, RN  Return ADL Status to a Safe Level of Function: Administer medication as ordered     Problem: Gastrointestinal - Adult  Goal: Minimal or absence of nausea and vomiting  Outcome: Progressing  Flowsheets (Taken 2/27/2023 1930)  Minimal or absence of nausea and vomiting: Administer IV fluids as ordered to ensure adequate hydration  Goal: Maintains or returns to baseline bowel function  Outcome: Progressing  Flowsheets (Taken 2/27/2023 1930)  Maintains or returns to baseline bowel function: Assess bowel function  Goal: Maintains adequate nutritional intake  Outcome: Progressing  Flowsheets (Taken 2/27/2023 1930)  Maintains adequate nutritional intake: Monitor percentage of each meal consumed     Problem: Genitourinary - Adult  Goal: Absence of urinary retention  2/27/2023 1959 by Rowena Prader, RN  Outcome: Progressing  Flowsheets (Taken 2/27/2023 0800 by Tiffanie Padilla RN)  Absence of urinary retention: Assess patients ability to void and empty bladder  2/27/2023 0736 by Tiffanie Padilla RN  Outcome: Progressing  Goal: Urinary catheter remains patent  Outcome: Progressing     Problem: ABCDS Injury Assessment  Goal: Absence of physical injury  2/27/2023 1959 by Rowena Prader, RN  Outcome: Progressing  Flowsheets  Taken 2/27/2023 1956 by Rowena Prader, RN  Absence of Physical Injury: Implement safety measures based on patient assessment  Taken 2/27/2023 0739 by Tiffanie Padilla RN  Absence of Physical Injury: Implement safety measures based on patient assessment  2/27/2023 0736 by Tiffanie Padilla RN  Outcome: Progressing     Problem: Skin/Tissue Integrity  Goal: Absence of new skin breakdown  Description: 1. Monitor for areas of redness and/or skin breakdown  2. Assess vascular access sites hourly  3. Every 4-6 hours minimum:  Change oxygen saturation probe site  4. Every 4-6 hours:  If on nasal continuous positive airway pressure, respiratory therapy assess nares and determine need for appliance change or resting period.   2/27/2023 1959 by Rowena Prader, RN  Outcome: Progressing  2/27/2023 3642 by Win Madden RN  Outcome: Progressing     Problem: Pain  Goal: Verbalizes/displays adequate comfort level or baseline comfort level  2/27/2023 1959 by Nataliya Bullock RN  Outcome: Progressing  Flowsheets (Taken 2/27/2023 0800 by Win Madden RN)  Verbalizes/displays adequate comfort level or baseline comfort level: Encourage patient to monitor pain and request assistance  2/27/2023 0736 by Win Madden RN  Outcome: Not Progressing     Problem: Neurosensory - Adult  Goal: Achieves maximal functionality and self care  2/27/2023 1959 by Nataliya Bullock RN  Outcome: Progressing  2/27/2023 0736 by Win Madden RN  Outcome: Not Progressing  Flowsheets  Taken 2/27/2023 0600 by Nataliya Bullock RN  Achieves maximal functionality and self care: Monitor swallowing and airway patency with patient fatigue and changes in neurological status  Taken 2/27/2023 0400 by Nataliya Bullock RN  Achieves maximal functionality and self care: Monitor swallowing and airway patency with patient fatigue and changes in neurological status  Taken 2/27/2023 0200 by Nataliya Bullock RN  Achieves maximal functionality and self care: Monitor swallowing and airway patency with patient fatigue and changes in neurological status

## 2023-02-28 NOTE — PROGRESS NOTES
Report called to LISBET Mcneal on 84. Patient belongings packed up in bed with him. Other belongings still with security. Transport requested.

## 2023-02-28 NOTE — CARE COORDINATION
Per chart review, pt lives at [place] [address] [address] Pt lives alone in a 1 story home with 4 stairs to enter and 1 rail. Bed is on 1st floor and bath is on 1st floor. Pt ambulated with no AD and was independent PTA. Emergency contact for father Mark Romano and mom Colleen Ahumada phone# [phone]. Patient was following up with [facility] and [facility] services for rehab. Laney from [facility] is following. Message left for her to see patient today. I met with patient in room to discuss transition of care. He said his  Shay Mckinney from his [facility] in [facility] told him that it is not a good idea to go home alone. He said he was active with [facility] and [facility] and would like to see if he could go residential there. Await input from [facility] today.   Esperanza Lentz RN CM  [phone]

## 2023-03-01 VITALS
HEIGHT: 70 IN | SYSTOLIC BLOOD PRESSURE: 120 MMHG | BODY MASS INDEX: 24.81 KG/M2 | DIASTOLIC BLOOD PRESSURE: 95 MMHG | TEMPERATURE: 97.8 F | RESPIRATION RATE: 18 BRPM | HEART RATE: 88 BPM | OXYGEN SATURATION: 97 % | WEIGHT: 173.31 LBS

## 2023-03-01 LAB
ALBUMIN SERPL-MCNC: 3.9 G/DL (ref 3.5–5.2)
ALP BLD-CCNC: 65 U/L (ref 40–129)
ALT SERPL-CCNC: 162 U/L (ref 0–40)
ANION GAP SERPL CALCULATED.3IONS-SCNC: 12 MMOL/L (ref 7–16)
AST SERPL-CCNC: 102 U/L (ref 0–39)
BILIRUB SERPL-MCNC: 0.5 MG/DL (ref 0–1.2)
BUN BLDV-MCNC: 15 MG/DL (ref 6–20)
CALCIUM SERPL-MCNC: 9.5 MG/DL (ref 8.6–10.2)
CHLORIDE BLD-SCNC: 102 MMOL/L (ref 98–107)
CO2: 25 MMOL/L (ref 22–29)
CREAT SERPL-MCNC: 0.9 MG/DL (ref 0.7–1.2)
GFR SERPL CREATININE-BSD FRML MDRD: >60 ML/MIN/1.73
GLUCOSE BLD-MCNC: 156 MG/DL (ref 74–99)
HCT VFR BLD CALC: 43.1 % (ref 37–54)
HCV QNT BY NAAT IU/ML: ABNORMAL IU/ML
HCV QNT BY NAAT LOG IU/ML: 6.56 LOG IU/ML
HEMOGLOBIN: 15.2 G/DL (ref 12.5–16.5)
INTERPRETATION: DETECTED
MAGNESIUM: 1.8 MG/DL (ref 1.6–2.6)
MCH RBC QN AUTO: 33.3 PG (ref 26–35)
MCHC RBC AUTO-ENTMCNC: 35.3 % (ref 32–34.5)
MCV RBC AUTO: 94.3 FL (ref 80–99.9)
PDW BLD-RTO: 13.9 FL (ref 11.5–15)
PLATELET # BLD: 265 E9/L (ref 130–450)
PMV BLD AUTO: 9.9 FL (ref 7–12)
POTASSIUM SERPL-SCNC: 4.5 MMOL/L (ref 3.5–5)
RBC # BLD: 4.57 E12/L (ref 3.8–5.8)
SODIUM BLD-SCNC: 139 MMOL/L (ref 132–146)
TOTAL PROTEIN: 7.2 G/DL (ref 6.4–8.3)
WBC # BLD: 6.8 E9/L (ref 4.5–11.5)

## 2023-03-01 PROCEDURE — 80053 COMPREHEN METABOLIC PANEL: CPT

## 2023-03-01 PROCEDURE — 85027 COMPLETE CBC AUTOMATED: CPT

## 2023-03-01 PROCEDURE — 2580000003 HC RX 258: Performed by: INTERNAL MEDICINE

## 2023-03-01 PROCEDURE — 6360000002 HC RX W HCPCS: Performed by: INTERNAL MEDICINE

## 2023-03-01 PROCEDURE — 36415 COLL VENOUS BLD VENIPUNCTURE: CPT

## 2023-03-01 PROCEDURE — 6370000000 HC RX 637 (ALT 250 FOR IP): Performed by: INTERNAL MEDICINE

## 2023-03-01 PROCEDURE — 83735 ASSAY OF MAGNESIUM: CPT

## 2023-03-01 PROCEDURE — 97535 SELF CARE MNGMENT TRAINING: CPT

## 2023-03-01 RX ORDER — IBUPROFEN 600 MG/1
600 TABLET ORAL 3 TIMES DAILY
Qty: 42 TABLET | Refills: 0 | Status: SHIPPED | OUTPATIENT
Start: 2023-03-01 | End: 2023-03-01 | Stop reason: HOSPADM

## 2023-03-01 RX ORDER — PANTOPRAZOLE SODIUM 20 MG/1
20 TABLET, DELAYED RELEASE ORAL
Qty: 14 TABLET | Refills: 0 | Status: SHIPPED | OUTPATIENT
Start: 2023-03-01 | End: 2023-03-01 | Stop reason: HOSPADM

## 2023-03-01 RX ADMIN — CYANOCOBALAMIN TAB 1000 MCG 1000 MCG: 1000 TAB at 08:10

## 2023-03-01 RX ADMIN — FOLIC ACID 1 MG: 1 TABLET ORAL at 08:10

## 2023-03-01 RX ADMIN — Medication 100 MG: at 08:12

## 2023-03-01 RX ADMIN — PANTOPRAZOLE SODIUM 40 MG: 40 TABLET, DELAYED RELEASE ORAL at 06:34

## 2023-03-01 RX ADMIN — ENOXAPARIN SODIUM 40 MG: 100 INJECTION SUBCUTANEOUS at 08:12

## 2023-03-01 RX ADMIN — Medication 2000 UNITS: at 08:12

## 2023-03-01 RX ADMIN — SODIUM CHLORIDE, PRESERVATIVE FREE 10 ML: 5 INJECTION INTRAVENOUS at 08:13

## 2023-03-01 ASSESSMENT — PAIN SCALES - GENERAL: PAINLEVEL_OUTOF10: 0

## 2023-03-01 NOTE — PROGRESS NOTES
OCCUPATIONAL THERAPY treatment note  [facility] [address] [address]      Date:3/1/2023                                                  Patient Name: Ratna Calderon  MRN: [id number]  : 1970  Room: [address] [place] [address]    Per eval:  Evaluating OT: Clarisse Solis, MOT, OTR/L [id number]  Referring Dayna Arroyo MD  Specific Provider Orders: OT eval and treat   Recommended Adaptive Equipment:  TBD     Diagnosis: respiratory failure   Surgery: none   Pertinent Medical History: HTN, a-fib   Precautions:  Fall Risk    Assessment of current deficits   [x] Functional mobility  [x]ADLs  [x] Strength               [x]Cognition   [x] Functional transfers   [x] IADLs         [x] Safety Awareness   [x]Endurance   [] Fine Coordination              [x] Balance      [] Vision/perception   [x]Sensation    []Gross Motor Coordination  [] ROM  [] Delirium                   [] Motor Control     OT PLAN OF CARE   OT POC based on physician orders, patient diagnosis and results of clinical assessment    Frequency/Duration: 2-4 days/wk for 2 weeks PRN   Specific OT Treatment to include:   * Instruction/training on adapted ADL techniques and AE recommendations to increase functional independence within precautions       * Training on energy conservation strategies, correct breathing pattern and techniques to improve independence/tolerance for self-care routine  * Functional transfer/mobility training/DME recommendations for increased independence, safety, and fall prevention  * Patient/Family education to increase follow through with safety techniques and functional independence  * Recommendation of environmental modifications for increased safety with functional transfers/mobility and ADLs  * Therapeutic exercise to improve motor endurance, ROM, and functional strength for ADLs/functional transfers  * Therapeutic activities to facilitate/challenge dynamic balance, stand tolerance for increased safety and independence with ADLs  * Neuro-muscular re-education: facilitation of righting/equilibrium reactions, midline orientation, scapular stability/mobility, normalization of muscle tone, and facilitation of volitional active controled movement    Home Living: Pt lives alone in an apartment with step(s) to enter; bed/bath on 1 level when entering   Bathroom setup: tub shower unit   Equipment owned: shower chair  Prior Level of Function: IND with ADLs/IADLs; using no AD for functional mobility     Pain Level: L arm  Cognition: A&O: 3/4; Follows 1 step directions, with repetition and increased time   Memory:  good    Sequencing:  good    Problem solving:  fair    Judgement/safety:  fair     Functional Assessment:  AM-PAC Daily Activity Raw Score: 19/24   Initial Eval Status  Date: 2/28/23 Treatment Status  Date:3/1 STGs=LTGs  Time Frame: 10-14 days   Feeding IND      Grooming Min A (standing at sink)  SUP (standing at sink) SBA   UB Dressing SBA  Set-up IND   LB Dressing Min A (pt crossed BLEs to doff/don B socks) SBA (pt doffed/donned pants and socks) SBA    Bathing Min A (simulated) SBA (sponge bath seated/standing at sink) SBA    Toileting Min A  SBA SBA   Bed Mobility  Log roll: SBA  Supine to sit: SBA   Sit to supine: SBA  SUP Log roll IND  Supine to sit: IND   Sit to supine: IND   Functional Transfers Sit to stand:Min A   Stand to sit:Min A  Commode: Min A SBA SBA   Functional Mobility Min A (using ww, to/from bathroom) SBA (using no AD, to/from bathroom) SBA   Balance Sitting: SBA  Standing: Min A SBA    Activity Tolerance fair fair    Visual/  Perceptual Glasses: yes              UE ROM: RUE:  WFL  LUE:  WFL  Strength: RUE: grossly 4/5 LUE: grossly 4/5   Strength: B WFL  Fine Motor Coordination:  WFL     Hearing: WFL  Sensation:  No c/o numbness/tingling   Tone:  WFL  Edema: none noted                            Comments:Cleared by RN to see pt.  Upon arrival, patient supine in bed and agreeable to OT session. At end of session, patient supine in bed with call light and phone within reach, all lines and tubes intact. Treatment: Completed ADLs/functional transfers, see above for assessment. Pt appeared to have tolerated session well and appears motivated/cooperative/pleasant . Pt instructed on use of call light for assistance and fall prevention. Pt demo'ing fair understanding of education provided.  Continue to educate.     -pt has made good progress toward goals  -continue current POC    Time In: 1030       Time Out: 1055     Total treatment time: 25       Treatment Charges: Mins Units   OT Eval Low 60902     OT Eval Medium 77196     OT Eval High 99319     OT Re-Eval 51847     Ther Ex  05542       Manual Therapy 21375       Thera Activities 59921       ADL/Home Mgt 31527 25 2   Neuro Re-ed [address] manage/training  57470       Non-Billable Time           Taiwo Langley, [address] [facility], OTR/L [id number]

## 2023-03-01 NOTE — PLAN OF CARE
Problem: Pain  Goal: Verbalizes/displays adequate comfort level or baseline comfort level  Outcome: Completed     Problem: Safety - Adult  Goal: Free from fall injury  Outcome: Completed     Problem: Neurosensory - Adult  Goal: Achieves stable or improved neurological status  Outcome: Completed  Goal: Achieves maximal functionality and self care  Outcome: Completed     Problem: Respiratory - Adult  Goal: Achieves optimal ventilation and oxygenation  Outcome: Completed     Problem: Cardiovascular - Adult  Goal: Maintains optimal cardiac output and hemodynamic stability  Outcome: Completed  Goal: Absence of cardiac dysrhythmias or at baseline  Outcome: Completed     Problem: Musculoskeletal - Adult  Goal: Return mobility to safest level of function  Outcome: Completed  Goal: Maintain proper alignment of affected body part  Outcome: Completed  Goal: Return ADL status to a safe level of function  Outcome: Completed     Problem: Gastrointestinal - Adult  Goal: Minimal or absence of nausea and vomiting  Outcome: Completed  Goal: Maintains or returns to baseline bowel function  Outcome: Completed  Goal: Maintains adequate nutritional intake  Outcome: Completed     Problem: Genitourinary - Adult  Goal: Absence of urinary retention  Outcome: Completed  Goal: Urinary catheter remains patent  Outcome: Completed     Problem: ABCDS Injury Assessment  Goal: Absence of physical injury  Outcome: Completed     Problem: Skin/Tissue Integrity  Goal: Absence of new skin breakdown  Description: 1. Monitor for areas of redness and/or skin breakdown  2. Assess vascular access sites hourly  3. Every 4-6 hours minimum:  Change oxygen saturation probe site  4. Every 4-6 hours:  If on nasal continuous positive airway pressure, respiratory therapy assess nares and determine need for appliance change or resting period.   Outcome: Completed     Problem: Self Harm/Suicidality  Goal: Will have no self-injury during hospital stay  Description: INTERVENTIONS:  1.  Ensure constant observer at bedside with Q15M safety checks  2.  Maintain a safe environment  3.  Secure patient belongings  4.  Ensure family/visitors adhere to safety recommendations  5.  Ensure safety tray has been added to patient's diet order  6.  Every shift and PRN: Re-assess suicidal risk via Frequent Screener    3/1/2023 1326 by Teresita Henry RN  Outcome: Completed  3/1/2023 0027 by Mayte Garcia RN  Outcome: Progressing

## 2023-03-01 NOTE — DISCHARGE SUMMARY
Hospital Medicine Discharge Summary    Patient ID: Maria Gosselin      Patient's PCP: GLYNN Dorsey - IRV[facility]    Admit Date: 2/21/2023     Discharge Date:   3/1/2023    Admitting Physician: Thiago Oneal MD     Discharge Physician: Leonor Guerrero MD     Discharge Diagnoses: Active Hospital Problems    Diagnosis Date Noted    DTs (delirium tremens) ([address] [F10.931] 02/24/2023     Priority: Medium    Alcoholism ([address] [F10.20] 02/23/2023     Priority: Medium    Alcohol abuse [F10.10] 02/23/2023     Priority: Medium    Alcohol dependence ([address] [F10.20] 02/23/2023     Priority: Medium    Iron deficiency anemia [D50.9] 02/23/2023     Priority: Medium    Accidental drug overdose [T50.901A] 02/22/2023     Priority: Medium    Polysubstance abuse ([address] [F19.10] 02/22/2023     Priority: Medium    Noncompliance [Z91.199] 02/22/2023     Priority: Medium    HTN (hypertension) [I10] 02/22/2023     Priority: Medium    Cocaine abuse ([address] [F14.10] 02/22/2023     Priority: Medium    Narcotic abuse ([address] [F11.10] 02/22/2023     Priority: Medium    Marijuana abuse [F12.10] 02/22/2023     Priority: Medium    [place] coma scale total score 3-8 ([address] [R40.2430] 02/22/2023     Priority: Medium    Acute respiratory failure ([address] [J96.00] 02/21/2023     Priority: Medium    Atrial fibrillation ([address] [I48.91] 08/07/2020    SVT (supraventricular tachycardia) ([address] [I47.1] 08/07/2020       The patient was seen and examined on day of discharge and this discharge summary is in conjunction with any daily progress note from day of discharge. Hospital Course: Patient is a  44 y/o male with a PMH as outlined who was admitted via the ED on 2/21/2023 after he was found unresponsive behind a soup kitchen. He was brought in to the ED. He responded to narcan in the ED and became agitated. He was therefore intubated to protect his airway and admitted to the ICU to be managed for acute encephalopathy due to accidental drug overdose.  He was placed on precedex drip. He was subsequently extubated and weaned off precedex drip. He was placed on alcohol withdrawal protocol. He was pink slipped and psych was consulted. He was subsequently transferred out of the ICU to the regular floor. He remained stable and was discharged home on 3/1/2023. He is to follow up with his PCP and to follow up with outpatient behavioral health. Of note, patient had superficial thrombophlebitis in his LUE from the IV access. Duplex of LUE showed superficial thrombus in the proximal and mid cephalic vein with no evidence of DVT. He was counseled to apply warm compresses and given a script for diclofenac gel to apply to the area for 2 weeks. He was counseled that  if swelling and pain worsened in that extremity, to come to the ED. Patient seen and examined prior to discharge. HE had no active complaints. Review of systems was otherwise negative. Labs and vitals reviewed. Home meds reviewed and reconciled. Exam:     BP (!) 120/95   Pulse 88   Temp 97.8 °F (36.6 °C) (Temporal)   Resp 18   Ht 5' 10\" (1.778 m)   Wt 173 lb 5 oz (78.6 kg)   SpO2 97%   BMI 24.87 kg/m²     General appearance: No apparent distress, appears stated age and cooperative. HEENT: Pupils equal, round, and reactive to light. Conjunctivae/corneas clear. Neck: Supple, with full range of motion. No jugular venous distention. Trachea midline. Respiratory:  Normal respiratory effort. Clear to auscultation, bilaterally without Rales/Wheezes/Rhonchi. Cardiovascular: Regular rate and rhythm with normal S1/S2 without murmurs, rubs or gallops. Abdomen: Soft, non-tender, non-distended with normal bowel sounds. Musculoskeletal: No clubbing, cyanosis or edema bilaterally. Full range of motion without deformity. Skin: Skin color, texture, turgor normal.  No rashes or lesions. Neurologic:  Neurovascularly intact without any focal sensory/motor deficits.  Cranial nerves: II-XII intact, grossly non-focal.  Psychiatric: Alert and oriented, thought content appropriate, normal insight      Consults:     IP CONSULT TO HOSPITALIST  IP CONSULT TO CRITICAL CARE  IP CONSULT TO INTERNAL MEDICINE  IP CONSULT TO DIETITIAN  IP CONSULT TO SOCIAL WORK  IP CONSULT TO SOCIAL WORK  IP CONSULT TO PSYCHIATRY    Significant Diagnostic Studies:     US DUP UPPER EXTREMITY LEFT VENOUS   Final Result   No evidence of DVT. Superficial thrombus in the cephalic vein. XR CHEST PORTABLE   Final Result   No airspace opacity or pleural effusion. US DUP LOWER EXTREMITY RIGHT MADELIN   Final Result   Within the visualized vessels there is no evidence for deep venous   thrombosis               XR CHEST PORTABLE   Final Result   Advancement of endotracheal tube to just above the thoracic inlet. No new   abnormal findings. XR CHEST PORTABLE   Final Result   High position of endotracheal tube. No acute process. CT HEAD WO CONTRAST   Final Result   No acute intracranial abnormality. Specifically, there is no acute   intracranial hemorrhage      Right maxillary and bilateral ethmoid sinus disease. CT CERVICAL SPINE WO CONTRAST   Final Result   1. There is no acute compression fracture or subluxation of the cervical   spine. 2. Multilevel degenerative disc and degenerative joint disease. Disposition:  home     Discharge Instructions/Follow-up:  follow up with PCP and behavioral health     Code Status:  Full Code     Activity: activity as tolerated    Diet: cardiac diet    Labs:  For convenience and continuity at follow-up the following most recent labs are provided:      CBC:    Lab Results   Component Value Date/Time    WBC 6.8 03/01/2023 04:53 AM    HGB 15.2 03/01/2023 04:53 AM    HCT 43.1 03/01/2023 04:53 AM     03/01/2023 04:53 AM       Renal:    Lab Results   Component Value Date/Time     03/01/2023 04:53 AM    K 4.5 03/01/2023 04:53 AM     03/01/2023 04:53 AM CO2 25 03/01/2023 04:53 AM    BUN 15 03/01/2023 04:53 AM    CREATININE 0.9 03/01/2023 04:53 AM    CALCIUM 9.5 03/01/2023 04:53 AM    PHOS 2.7 02/27/2023 04:14 AM       Discharge Medications: There are no discharge medications for this patient. Time Spent on discharge is more than 45 minutes in the examination, evaluation, counseling and review of medications and discharge plan.       Signed:    Mario Castro MD   3/1/2023

## 2023-03-01 NOTE — PROGRESS NOTES
DR Kitty Adkins NOTIFIED OF U/S RESULT OF   No evidence of DVT. Superficial thrombus in the cephalic vein.

## 2023-03-01 NOTE — CARE COORDINATION
Peer Recovery Support Note    Name: Marylene Salts  Date: 3/1/2023    Chief Complaint   Patient presents with    Drug Overdose     Found unresp outside of soup kitchen given 10mg narcan        Peer Support met with patient. [x] Support and education provided  [] Resources provided   [] Treatment referral:   [x] Other:   [] Patient declined peer recovery services     Referred By: [facility] (RN)    Notes: Patient assured Peer that his  wanted him to go home and follow up with [facility].     Signed: Jordan Ibarra, 3/1/2023

## 2023-03-02 ENCOUNTER — TELEPHONE (OUTPATIENT)
Dept: PRIMARY CARE CLINIC | Age: 53
End: 2023-03-02

## 2023-03-10 NOTE — TELEPHONE ENCOUNTER
3/2/23    Tried calling patient. His wife answered his phone and said they are  and she does not know his phone number. Contacted mother and she states phone was stolen. I asked her to have patient call us to schedule an appt. She verbalized her understanding.

## 2023-03-24 ENCOUNTER — APPOINTMENT (OUTPATIENT)
Dept: GENERAL RADIOLOGY | Age: 53
End: 2023-03-24
Payer: MEDICAID

## 2023-03-24 ENCOUNTER — HOSPITAL ENCOUNTER (OUTPATIENT)
Age: 53
Setting detail: OBSERVATION
Discharge: HOME | End: 2023-03-25
Attending: EMERGENCY MEDICINE | Admitting: FAMILY MEDICINE
Payer: MEDICAID

## 2023-03-24 ENCOUNTER — APPOINTMENT (OUTPATIENT)
Dept: CT IMAGING | Age: 53
End: 2023-03-24
Payer: MEDICAID

## 2023-03-24 DIAGNOSIS — R07.9 CHEST PAIN, UNSPECIFIED TYPE: Primary | ICD-10-CM

## 2023-03-24 DIAGNOSIS — R51.9 HEADACHE, UNSPECIFIED HEADACHE TYPE: ICD-10-CM

## 2023-03-24 LAB
ALBUMIN SERPL-MCNC: 4.3 G/DL (ref 3.5–5.2)
ALP SERPL-CCNC: 50 U/L (ref 40–129)
ALT SERPL-CCNC: 97 U/L (ref 0–40)
AMPHET UR QL SCN: NOT DETECTED
ANION GAP SERPL CALCULATED.3IONS-SCNC: 7 MMOL/L (ref 7–16)
APAP SERPL-MCNC: <5 MCG/ML (ref 10–30)
AST SERPL-CCNC: 47 U/L (ref 0–39)
BARBITURATES UR QL SCN: NOT DETECTED
BASOPHILS # BLD: 0.04 E9/L (ref 0–0.2)
BASOPHILS NFR BLD: 0.5 % (ref 0–2)
BENZODIAZ UR QL SCN: NOT DETECTED
BILIRUB SERPL-MCNC: 0.4 MG/DL (ref 0–1.2)
BUN SERPL-MCNC: 13 MG/DL (ref 6–20)
CALCIUM SERPL-MCNC: 9.2 MG/DL (ref 8.6–10.2)
CANNABINOIDS UR QL SCN: NOT DETECTED
CHLORIDE SERPL-SCNC: 105 MMOL/L (ref 98–107)
CO2 SERPL-SCNC: 28 MMOL/L (ref 22–29)
COCAINE UR QL SCN: NOT DETECTED
CREAT SERPL-MCNC: 0.8 MG/DL (ref 0.7–1.2)
DRUG SCREEN COMMENT UR-IMP: NORMAL
EOSINOPHIL # BLD: 0.13 E9/L (ref 0.05–0.5)
EOSINOPHIL NFR BLD: 1.5 % (ref 0–6)
ERYTHROCYTE [DISTWIDTH] IN BLOOD BY AUTOMATED COUNT: 13.5 FL (ref 11.5–15)
ETHANOLAMINE SERPL-MCNC: <10 MG/DL (ref 0–0.08)
FENTANYL SCREEN, URINE: NOT DETECTED
GLUCOSE SERPL-MCNC: 107 MG/DL (ref 74–99)
HCT VFR BLD AUTO: 42.4 % (ref 37–54)
HGB BLD-MCNC: 14.6 G/DL (ref 12.5–16.5)
IMM GRANULOCYTES # BLD: 0.04 E9/L
IMM GRANULOCYTES NFR BLD: 0.5 % (ref 0–5)
LYMPHOCYTES # BLD: 2.52 E9/L (ref 1.5–4)
LYMPHOCYTES NFR BLD: 29.3 % (ref 20–42)
MCH RBC QN AUTO: 32.2 PG (ref 26–35)
MCHC RBC AUTO-ENTMCNC: 34.4 % (ref 32–34.5)
MCV RBC AUTO: 93.6 FL (ref 80–99.9)
METHADONE UR QL SCN: NOT DETECTED
MONOCYTES # BLD: 0.75 E9/L (ref 0.1–0.95)
MONOCYTES NFR BLD: 8.7 % (ref 2–12)
NEUTROPHILS # BLD: 5.13 E9/L (ref 1.8–7.3)
NEUTS SEG NFR BLD: 59.5 % (ref 43–80)
OPIATES UR QL SCN: NOT DETECTED
OXYCODONE URINE: NOT DETECTED
PCP UR QL SCN: NOT DETECTED
PLATELET # BLD AUTO: 149 E9/L (ref 130–450)
PMV BLD AUTO: 9.8 FL (ref 7–12)
POTASSIUM SERPL-SCNC: 4.1 MMOL/L (ref 3.5–5)
PROT SERPL-MCNC: 6.7 G/DL (ref 6.4–8.3)
RBC # BLD AUTO: 4.53 E12/L (ref 3.8–5.8)
SALICYLATES SERPL-MCNC: 1.1 MG/DL (ref 0–30)
SODIUM SERPL-SCNC: 140 MMOL/L (ref 132–146)
TRICYCLIC ANTIDEPRESSANTS SCREEN SERUM: NEGATIVE NG/ML
TROPONIN, HIGH SENSITIVITY: 9 NG/L (ref 0–11)
TROPONIN, HIGH SENSITIVITY: 9 NG/L (ref 0–11)
WBC # BLD: 8.6 E9/L (ref 4.5–11.5)

## 2023-03-24 PROCEDURE — 71045 X-RAY EXAM CHEST 1 VIEW: CPT

## 2023-03-24 PROCEDURE — 85025 COMPLETE CBC W/AUTO DIFF WBC: CPT

## 2023-03-24 PROCEDURE — 80053 COMPREHEN METABOLIC PANEL: CPT

## 2023-03-24 PROCEDURE — 82077 ASSAY SPEC XCP UR&BREATH IA: CPT

## 2023-03-24 PROCEDURE — 84484 ASSAY OF TROPONIN QUANT: CPT

## 2023-03-24 PROCEDURE — 70450 CT HEAD/BRAIN W/O DYE: CPT

## 2023-03-24 PROCEDURE — 93005 ELECTROCARDIOGRAM TRACING: CPT | Performed by: EMERGENCY MEDICINE

## 2023-03-24 PROCEDURE — 80179 DRUG ASSAY SALICYLATE: CPT

## 2023-03-24 PROCEDURE — 80143 DRUG ASSAY ACETAMINOPHEN: CPT

## 2023-03-24 PROCEDURE — 99285 EMERGENCY DEPT VISIT HI MDM: CPT

## 2023-03-24 PROCEDURE — 80307 DRUG TEST PRSMV CHEM ANLYZR: CPT

## 2023-03-24 ASSESSMENT — HEART SCORE: ECG: 0

## 2023-03-24 NOTE — LETTER
March 25, 2023       Delories Route YOB: 1970   [address] [facility] Date of Visit:  3/24/2023       To Whom It May Concern: It is my medical opinion that Bentley Newell can return to work and all activities with no restrictions. If you have any questions or concerns, please don't hesitate to call.     Sincerely,

## 2023-03-25 ENCOUNTER — APPOINTMENT (OUTPATIENT)
Dept: NUCLEAR MEDICINE | Age: 53
End: 2023-03-25
Payer: MEDICAID

## 2023-03-25 VITALS
HEART RATE: 79 BPM | HEIGHT: 71 IN | SYSTOLIC BLOOD PRESSURE: 121 MMHG | RESPIRATION RATE: 20 BRPM | TEMPERATURE: 97.8 F | BODY MASS INDEX: 25.9 KG/M2 | WEIGHT: 185 LBS | OXYGEN SATURATION: 97 % | DIASTOLIC BLOOD PRESSURE: 81 MMHG

## 2023-03-25 PROBLEM — R07.9 CHEST PAIN IN ADULT: Status: ACTIVE | Noted: 2023-03-25

## 2023-03-25 LAB
EKG ATRIAL RATE: 66 BPM
EKG P AXIS: 61 DEGREES
EKG P-R INTERVAL: 126 MS
EKG Q-T INTERVAL: 422 MS
EKG QRS DURATION: 66 MS
EKG QTC CALCULATION (BAZETT): 442 MS
EKG R AXIS: 43 DEGREES
EKG T AXIS: 66 DEGREES
EKG VENTRICULAR RATE: 66 BPM
LV EF: 73 %
LVEF MODALITY: NORMAL

## 2023-03-25 PROCEDURE — G0378 HOSPITAL OBSERVATION PER HR: HCPCS

## 2023-03-25 PROCEDURE — 93017 CV STRESS TEST TRACING ONLY: CPT

## 2023-03-25 PROCEDURE — 93016 CV STRESS TEST SUPVJ ONLY: CPT | Performed by: INTERNAL MEDICINE

## 2023-03-25 PROCEDURE — 93010 ELECTROCARDIOGRAM REPORT: CPT | Performed by: INTERNAL MEDICINE

## 2023-03-25 PROCEDURE — 78452 HT MUSCLE IMAGE SPECT MULT: CPT | Performed by: INTERNAL MEDICINE

## 2023-03-25 PROCEDURE — 78452 HT MUSCLE IMAGE SPECT MULT: CPT

## 2023-03-25 PROCEDURE — A9500 TC99M SESTAMIBI: HCPCS | Performed by: RADIOLOGY

## 2023-03-25 PROCEDURE — 93018 CV STRESS TEST I&R ONLY: CPT | Performed by: INTERNAL MEDICINE

## 2023-03-25 PROCEDURE — 3430000000 HC RX DIAGNOSTIC RADIOPHARMACEUTICAL: Performed by: RADIOLOGY

## 2023-03-25 PROCEDURE — 6370000000 HC RX 637 (ALT 250 FOR IP): Performed by: HOSPITALIST

## 2023-03-25 PROCEDURE — 2580000003 HC RX 258: Performed by: FAMILY MEDICINE

## 2023-03-25 RX ORDER — ONDANSETRON 2 MG/ML
4 INJECTION INTRAMUSCULAR; INTRAVENOUS EVERY 6 HOURS PRN
Status: DISCONTINUED | OUTPATIENT
Start: 2023-03-25 | End: 2023-03-26 | Stop reason: HOSPADM

## 2023-03-25 RX ORDER — ASPIRIN 81 MG/1
81 TABLET, CHEWABLE ORAL DAILY
Status: DISCONTINUED | OUTPATIENT
Start: 2023-03-26 | End: 2023-03-26 | Stop reason: HOSPADM

## 2023-03-25 RX ORDER — SODIUM CHLORIDE 0.9 % (FLUSH) 0.9 %
5-40 SYRINGE (ML) INJECTION PRN
Status: DISCONTINUED | OUTPATIENT
Start: 2023-03-25 | End: 2023-03-26 | Stop reason: HOSPADM

## 2023-03-25 RX ORDER — NICOTINE 21 MG/24HR
1 PATCH, TRANSDERMAL 24 HOURS TRANSDERMAL DAILY
Status: DISCONTINUED | OUTPATIENT
Start: 2023-03-25 | End: 2023-03-26 | Stop reason: HOSPADM

## 2023-03-25 RX ORDER — ONDANSETRON 4 MG/1
4 TABLET, ORALLY DISINTEGRATING ORAL EVERY 8 HOURS PRN
Status: DISCONTINUED | OUTPATIENT
Start: 2023-03-25 | End: 2023-03-26 | Stop reason: HOSPADM

## 2023-03-25 RX ORDER — M-VIT,TX,IRON,MINS/CALC/FOLIC 27MG-0.4MG
1 TABLET ORAL DAILY
COMMUNITY

## 2023-03-25 RX ORDER — POLYETHYLENE GLYCOL 3350 17 G/17G
17 POWDER, FOR SOLUTION ORAL DAILY PRN
Status: DISCONTINUED | OUTPATIENT
Start: 2023-03-25 | End: 2023-03-26 | Stop reason: HOSPADM

## 2023-03-25 RX ORDER — TETRAKIS(2-METHOXYISOBUTYLISOCYANIDE)COPPER(I) TETRAFLUOROBORATE 1 MG/ML
39.6 INJECTION, POWDER, LYOPHILIZED, FOR SOLUTION INTRAVENOUS
Status: COMPLETED | OUTPATIENT
Start: 2023-03-25 | End: 2023-03-25

## 2023-03-25 RX ORDER — ACETAMINOPHEN 325 MG/1
650 TABLET ORAL EVERY 6 HOURS PRN
Status: DISCONTINUED | OUTPATIENT
Start: 2023-03-25 | End: 2023-03-26 | Stop reason: HOSPADM

## 2023-03-25 RX ORDER — ENOXAPARIN SODIUM 100 MG/ML
40 INJECTION SUBCUTANEOUS DAILY
Status: DISCONTINUED | OUTPATIENT
Start: 2023-03-25 | End: 2023-03-26 | Stop reason: HOSPADM

## 2023-03-25 RX ORDER — NICOTINE 21 MG/24HR
1 PATCH, TRANSDERMAL 24 HOURS TRANSDERMAL DAILY
Qty: 30 PATCH | Refills: 0 | Status: SHIPPED | OUTPATIENT
Start: 2023-03-26

## 2023-03-25 RX ORDER — SODIUM CHLORIDE 9 MG/ML
INJECTION, SOLUTION INTRAVENOUS PRN
Status: DISCONTINUED | OUTPATIENT
Start: 2023-03-25 | End: 2023-03-26 | Stop reason: HOSPADM

## 2023-03-25 RX ORDER — TETRAKIS(2-METHOXYISOBUTYLISOCYANIDE)COPPER(I) TETRAFLUOROBORATE 1 MG/ML
12 INJECTION, POWDER, LYOPHILIZED, FOR SOLUTION INTRAVENOUS
Status: COMPLETED | OUTPATIENT
Start: 2023-03-25 | End: 2023-03-25

## 2023-03-25 RX ORDER — ACETAMINOPHEN 650 MG/1
650 SUPPOSITORY RECTAL EVERY 6 HOURS PRN
Status: DISCONTINUED | OUTPATIENT
Start: 2023-03-25 | End: 2023-03-26 | Stop reason: HOSPADM

## 2023-03-25 RX ORDER — ATORVASTATIN CALCIUM 40 MG/1
40 TABLET, FILM COATED ORAL NIGHTLY
Qty: 30 TABLET | Refills: 0 | Status: SHIPPED | OUTPATIENT
Start: 2023-03-25

## 2023-03-25 RX ORDER — ATORVASTATIN CALCIUM 40 MG/1
40 TABLET, FILM COATED ORAL NIGHTLY
Status: DISCONTINUED | OUTPATIENT
Start: 2023-03-25 | End: 2023-03-26 | Stop reason: HOSPADM

## 2023-03-25 RX ORDER — SODIUM CHLORIDE 0.9 % (FLUSH) 0.9 %
5-40 SYRINGE (ML) INJECTION EVERY 12 HOURS SCHEDULED
Status: DISCONTINUED | OUTPATIENT
Start: 2023-03-25 | End: 2023-03-26 | Stop reason: HOSPADM

## 2023-03-25 RX ORDER — ASPIRIN 81 MG/1
81 TABLET, CHEWABLE ORAL DAILY
Qty: 30 TABLET | Refills: 0 | Status: SHIPPED | OUTPATIENT
Start: 2023-03-26

## 2023-03-25 RX ADMIN — Medication 39.6 MILLICURIE: at 12:50

## 2023-03-25 RX ADMIN — SODIUM CHLORIDE, PRESERVATIVE FREE 10 ML: 5 INJECTION INTRAVENOUS at 08:27

## 2023-03-25 RX ADMIN — Medication 12 MILLICURIE: at 10:10

## 2023-03-25 ASSESSMENT — PAIN SCALES - GENERAL: PAINLEVEL_OUTOF10: 0

## 2023-03-25 NOTE — ED NOTES
[facility] will need called if patient would like to return once discharged.  [facility]  03/24/23 6118

## 2023-03-25 NOTE — ED PROVIDER NOTES
[facility] ENCOUNTER        Pt Name: Ronny Bennett  MRN: [id number]  [place] 1970  Date of evaluation: 3/24/2023  Provider: Jonathan Siemens, MD  PCP: GLYNN Murphy NP  Note Started: 8:42 PM EDT 3/24/23    CHIEF COMPLAINT       Chief Complaint   Patient presents with    Chest Pain     (Watching TV CP then dizziness, then headache) 324 ASA AND 0.4 NITRO GIVEN PTA    Wheezing       HISTORY OF PRESENT ILLNESS: 1 or more Elements   History From: Patient    Limitations to history : None  Social Determinants : In drug rehab centre     Ronny Bennett is a 45 y.o. male with a history of polysubstance abuse, Atrial fibrillation, and hypertension who presents with acute onset chest pain in the mid sternum of the chest. Patient was sitting down eating fruits when this pain came on suddenly. Pain is described as a sharp burning pain that is constant. Pain remained in the sternum of the chest without radiation. Endorses that this is the first time to have ever occurred. No trauma or aggravating factors. Patient is not on any medications currently. Denies any correlation to food intake during the episode. Patient notes that he lives in a drug rehab centre where ambulance was called. He does mention his BP being over 200 at the facility. On route to the ED he was given Nitrate. Per patient, it did not help alleviate the chest pain much, but did reduce the headache somewhat. Associated symptom includes headache generalized and dizziness. No occular disturbances. Patient denies any alcohol or current drug use as he is 21 days sober; however, he does smoke 6 to 8 cigarettes per day. Denies any fever, chills, n/v, dizziness, vision changes, neck tenderness or stiffness, weakness, palpitations, leg swelling/tenderness, sob, cough, abd pain, dysuria, hematuria, diarrhea, constipation, and bloody stools.  Patient not on any Patient was placed on the cardiac monitor. Rhythm - NSR. While in the ED patient was hemodynamically stable, afebrile, nontoxic-appearing, in no respiratory distress. Physical exam unremarkable. Working diagnosis include unspecified chest pain and acute headache. Labs interpreted by me were only significant for elevated AST and ALT. EKG interpreted by me normal sinus with no sign of acute ischemia. Patient administered nothing during admission . Discussion With Other Professionals:  Consultation with Internal Medicine . The patient will be admitted for further treatment and evaluation for   1. Chest pain, unspecified type    2. Headache, unspecified headache type      None         Medications - No data to display    New Prescriptions    No medications on file       Counseling: The emergency provider has spoken with the patient and discussed todays results, in addition to providing specific details for the plan of care and counseling regarding the diagnosis and prognosis. Questions are answered at this time and they are agreeable with the plan.       --------------------------------- IMPRESSION AND DISPOSITION ---------------------------------    IMPRESSION  1. Chest pain, unspecified type    2. Headache, unspecified headache type        DISPOSITION  Disposition: Admit to telemetry  Patient condition is stable      NOTE: This report was transcribed using voice recognition software. Every effort was made to ensure accuracy; however, inadvertent computerized transcription errors may be present. Keli Guardado MD  Resident  03/25/23 6227  Please refer to my separate ED provider note with my attestation and my separate documentation of the encounter.             Jordan Castro MD  03/25/23 3515

## 2023-03-25 NOTE — ED NOTES
Pt asked clinical staff to be discharged. Pt stated \"I've been waiting here forever and havent eaten anything all day. This IV can come out and you can discharge me\". This nurse re-orientated pt and educated on MD need for admission. Pt verbal understanding noted that he will stay.       Wanda Bah  03/25/23 0034

## 2023-03-25 NOTE — DISCHARGE INSTR - COC
active    Resolved    COVID-19 (Rule Out) 23 Respiratory Panel, Molecular, with COVID-19 (Restricted: peds pts or suitable admitted adults) (Ordered)   23 Rule-Out Test Resulted            Nurse Assessment:  Last Vital Signs: /81   Pulse 79   Temp 97.8 °F (36.6 °C) (Temporal)   Resp 20   Ht 5' 11\" (1.803 m)   Wt 185 lb (83.9 kg)   SpO2 97%   BMI 25.80 kg/m²     Last documented pain score (0-10 scale): Pain Level: 0  Last Weight:   Wt Readings from Last 1 Encounters:   23 185 lb (83.9 kg)     Mental Status:  oriented, alert, coherent, logical, thought processes intact, and able to concentrate and follow conversation    IV Access:  - None    Nursing Mobility/ADLs:  Walking   Independent  Transfer  Independent  Bathing  Independent  Dressing  Independent  [address]  Independent  Med Delivery   whole    Wound Care Documentation and Therapy:        Elimination:  Continence: Bowel: No  Bladder: No  Urinary Catheter: None   Colostomy/Ileostomy/Ileal Conduit: No       Date of Last BM: 2023    Intake/Output Summary (Last 24 hours) at 3/25/2023 1729  Last data filed at 3/25/2023 1548  Gross per 24 hour   Intake 480 ml   Output --   Net 480 ml     No intake/output data recorded.     Safety Concerns:     None    Impairments/Disabilities:      None    Nutrition Therapy:  Current Nutrition Therapy:   - Oral Diet:  General    Routes of Feeding: Oral  Liquids: No Restrictions  Daily Fluid Restriction: no  Last Modified Barium Swallow with Video (Video Swallowing Test): not done    Treatments at the Time of Hospital Discharge:   Respiratory Treatments: ***  Oxygen Therapy:  ***  Ventilator:    - No ventilator support    Rehab Therapies: ***  Weight Bearing Status/Restrictions: [address]  Weight Bearin}  Other Medical Equipment (for information only, NOT a DME order):  ***  Other Treatments: ***    Patient's personal belongings (please select all that are sent with patient):  {[facility] DME Belongings:469578024}    RN SIGNATURE:  Electronically signed by Lilia Rubalcava RN on 3/25/23 at 5:33 PM EDT    CASE MANAGEMENT/SOCIAL WORK SECTION    Inpatient Status Date: ***    Readmission Risk Assessment Score:  Readmission Risk              Risk of Unplanned Readmission:  0           Discharging to Facility/ Agency   Name:   Address:  Phone:  Fax:    Dialysis Facility (if applicable)   Name:  Address:  Dialysis Schedule:  Phone:  Fax:    / signature: {Esignature:[id number]}    PHYSICIAN SECTION    Prognosis: {Prognosis:1139006020}    Condition at Discharge: [facility] Milian Patient Condition:[id number]}    Rehab Potential (if transferring to Rehab): {Prognosis:8896508722}    Recommended Labs or Other Treatments After Discharge: ***    Physician Certification: I certify the above information and transfer of Marylene Salts  is necessary for the continuing treatment of the diagnosis listed and that he requires {Admit to Appropriate Level of Care:81636} for {GREATER/LESS:573708512} 30 days.      Update Admission H&P: {[facility] DME Changes in QMFFL:342030412}    PHYSICIAN SIGNATURE:  {Esignature:[id number]}

## 2023-03-25 NOTE — ED PROVIDER NOTES
stable here he was made aware of lab results and findings and plan for admission. Patient will be admitted to monitored bed call was placed to on-call internal medicine  and he will admit.       --------------------------------- IMPRESSION AND DISPOSITION ---------------------------------    IMPRESSION  1. Chest pain, unspecified type    2.  Headache, unspecified headache type        DISPOSITION  Disposition: Admit to telemetry  Patient condition is stable    Mark Lal MD  03/24/23 2037       Mark Lal MD  03/25/23 0022       Mark Lal MD  03/25/23 5242 [facility], [place]  03/25/23 0062

## 2023-03-25 NOTE — PROGRESS NOTES
Discharge instructions given to patient and  all paperworks faxed to [phone], [facility]. Patient arranged his transportation.
effusion or pneumothorax. The cardiomediastinal silhouette is without acute process. The osseous structures are without acute process. No acute process. XR CHEST PORTABLE    Result Date: 2/26/2023  EXAMINATION: ONE XRAY VIEW OF THE CHEST 2/26/2023 8:46 am COMPARISON: February 22, 2023 HISTORY: ORDERING SYSTEM PROVIDED HISTORY: [facility] TECHNOLOGIST PROVIDED HISTORY: Reason for exam:->AHRF What reading provider will be dictating this exam?->CRC FINDINGS: Status post removal of endotracheal tube. No pneumothorax. No airspace opacity or pleural effusion. The heart is normal in size. No airspace opacity or pleural effusion. US DUP UPPER EXTREMITY LEFT VENOUS    Result Date: 2/28/2023  EXAMINATION: VENOUS ULTRASOUND OF THE LEFT UPPER EXTREMITY, 2/28/2023 3:58 pm TECHNIQUE: Duplex ultrasound using B-mode/gray scaled imaging and Doppler spectral analysis and color flow was obtained of the deep venous structures of the left upper extremity. COMPARISON: None. HISTORY: ORDERING SYSTEM PROVIDED HISTORY: r/o DVT TECHNOLOGIST PROVIDED HISTORY: Reason for exam:->r/o DVT What reading provider will be dictating this exam?->CRC FINDINGS: There is normal flow and compressibility of the visualized venous structures. There is no evidence of echogenic thrombus. The veins demonstrate good compressibility with normal color flow study and spectral analysis. There is superficial thrombus in the proximal and mid cephalic vein. No evidence of DVT. Superficial thrombus in the cephalic vein. NM Cardiac Stress Test Nuclear Imaging    Result Date: 3/25/2023  Indication: Chest Pain. Clinical History:   Patient has no known history of coronary artery disease. IMAGING: Myocardial perfusion imaging was performed at rest 30-35 minutes following the intravenous injection of 12.0 mCi of (Tc-Sestamibi) followed by 10 ml of Normal Saline. At peak exercise, the patient was injected intravenously with 39. 6mCi of (Tc-Sestamibi)

## 2023-03-25 NOTE — ED NOTES
Pt in CT at this time. Condition stable.       Adan Nj  03/24/23 8899 Pt informed of results and recommendations.

## 2023-03-25 NOTE — H&P
that he does not currently use alcohol. Family History:    Reviewed in detail and negative for DM, CAD, Cancer, CVA. Positive as follows\"      Adopted: Yes       REVIEW OF SYSTEMS:   Pertinent positives as noted in the HPI. All other systems reviewed and negative. PHYSICAL EXAM:  BP (!) 130/92   Pulse 68   Temp 97.9 °F (36.6 °C)   Resp 15   SpO2 90%   General appearance: No apparent distress, appears stated age and cooperative. HEENT: Normal cephalic, atraumatic without obvious deformity. Pupils equal, round, and reactive to light. Extra ocular muscles intact. Conjunctivae/corneas clear. Neck: Supple, with full range of motion. No jugular venous distention. Trachea midline. Respiratory: CTA  Cardiovascular: RRR  Abdomen: S/NT/ND  Musculoskeletal: No clubbing, cyanosis, edema of bilateral lower extremities. Brisk capillary refill. Skin: Normal skin color. No rashes or lesions. Neurologic:  Neurovascularly intact without any focal sensory/motor deficits. Cranial nerves: II-XII intact, grossly non-focal.  Psychiatric: Alert and oriented, thought content appropriate, normal insight    Reviewed EKG and CXR personally      CBC:   Recent Labs     03/24/23 2040   WBC 8.6   RBC 4.53   HGB 14.6   HCT 42.4   MCV 93.6   RDW 13.5        BMP:   Recent Labs     03/24/23 2040      K 4.1      CO2 28   BUN 13   CREATININE 0.8     LFT:  Recent Labs     03/24/23 2040   PROT 6.7   ALKPHOS 50   ALT 97*   AST 47*   BILITOT 0.4     CE:  No results for input(s): Patrandal Meigs in the last 72 hours. PT/INR: No results for input(s): INR, APTT in the last 72 hours. BNP: No results for input(s): BNP in the last 72 hours.   ESR: No results found for: SEDRATE  CRP: No results found for: CRP  D Dimer: No results found for: DDIMER   Folate and B12:   Lab Results   Component Value Date    YDYFFSHT64 460 02/23/2023   ,   Lab Results   Component Value Date    FOLATE 16.6 02/23/2023     Lactic Acid:   Lab intracranial abnormality. XR CHEST PORTABLE    Result Date: 3/24/2023  EXAMINATION: ONE XRAY VIEW OF THE CHEST 3/24/2023 8:49 pm COMPARISON: 02/26/2023 HISTORY: ORDERING SYSTEM PROVIDED HISTORY: chest pain TECHNOLOGIST PROVIDED HISTORY: Reason for exam:->chest pain What reading provider will be dictating this exam?->CRC FINDINGS: The lungs are without acute focal process. There is no effusion or pneumothorax. The cardiomediastinal silhouette is without acute process. The osseous structures are without acute process. No acute process. XR CHEST PORTABLE    Result Date: 2/26/2023  EXAMINATION: ONE XRAY VIEW OF THE CHEST 2/26/2023 8:46 am COMPARISON: February 22, 2023 HISTORY: ORDERING SYSTEM [facility] HISTORY: Reason for exam:->AHRF What reading provider will be dictating this exam?->CRC FINDINGS: Status post removal of endotracheal tube. No pneumothorax. No airspace opacity or pleural effusion. The heart is normal in size. No airspace opacity or pleural effusion. US DUP UPPER EXTREMITY LEFT VENOUS    Result Date: 2/28/2023  EXAMINATION: VENOUS ULTRASOUND OF THE LEFT UPPER EXTREMITY, 2/28/2023 3:58 pm TECHNIQUE: Duplex ultrasound using B-mode/gray scaled imaging and Doppler spectral analysis and color flow was obtained of the deep venous structures of the left upper extremity. COMPARISON: None. HISTORY: ORDERING SYSTEM PROVIDED HISTORY: r/o DVT TECHNOLOGIST PROVIDED HISTORY: Reason for exam:->r/o DVT What reading provider will be dictating this exam?->CRC FINDINGS: There is normal flow and compressibility of the visualized venous structures. There is no evidence of echogenic thrombus. The veins demonstrate good compressibility with normal color flow study and spectral analysis. There is superficial thrombus in the proximal and mid cephalic vein. No evidence of DVT. Superficial thrombus in the cephalic vein.      US DUP LOWER EXTREMITY RIGHT MADELIN    Result

## 2023-03-25 NOTE — DISCHARGE SUMMARY
Hospitalist Discharge Summary    Patient ID: Ronn Turner   Patient : 1970  Patient's PCP: GLYNN Harris NP    Admit Date: 3/24/2023   Admitting Physician: Reji Yi DO    Discharge Date:  3/25/2023   Discharge Physician: GLYNN Gutierrez CNP   Discharge Condition: Stable  Discharge Disposition: [facility] course in brief:  (Please refer to daily progress notes for a comprehensive review of the hospitalization by requesting medical records)  Briefly, patient with PMH significant for A-fib (allegedly had ablation), HTN, substance abuse currently at rehab presented to ED with complaint of chest pain. Of note patient received aspirin and nitro by EMS with improvement in symptoms. Hospital course grossly unremarkable including EKG (normal sinus rhythm), troponin and chest x-ray. Patient underwent stress test was read as low risk. Patient stable for discharge from medical perspective. Consults:   None    Discharge Diagnoses:  -Chest pain  -Hypertension  -Hyperlipidemia      Discharge Instructions / Follow up:    No future appointments. The patient's condition is stable. At this time the patient is without objective evidence of an acute process requiring continuing hospitalization or inpatient management. They are stable for discharge with outpatient follow-up. I have spoken with the patient and discussed the results of the current hospitalization, in addition to providing specific details for the plan of care and counseling regarding the diagnosis and prognosis. The plan has been discussed in detail and they are aware of the specific conditions for emergent return, as well as the importance of follow-up. Their questions are answered at this time and they are agreeable with the plan for discharge to home.     Continued appropriate risk factor modification of blood pressure, diabetes and serum lipids will remain essential to reducing risk of future

## 2023-03-25 NOTE — PROCEDURES
Following placement of an intravenous catheter 10 millicuries of technetium 99m sestamibi was administered with resting SPECT images obtained approximately 45 minutes post injection. After completion of resting images, the patient exercised for [de-identified] minutes on an accelerated Thomas protocol treadmill achieving 13.4 METs of activity and 90% MPHR. Baseline tracing demonstrates sinus rhythm and otherwise unremarkable. No anginal chest pain or cardiac arrhythmias were noted. A normal blood pressure response to exercise was recorded. No electrocardiographic changes were noted. One mintue prior to the completion of exercise 30 millicuries of technetium 99m sestamibi was injected with post stress SPECT images obtained approximately 30 minutes post stress. Perfusion images pending.

## 2023-03-26 ENCOUNTER — HOSPITAL ENCOUNTER (EMERGENCY)
Age: 53
Discharge: HOME | End: 2023-03-26
Attending: EMERGENCY MEDICINE
Payer: MEDICAID

## 2023-03-26 VITALS
TEMPERATURE: 97.8 F | DIASTOLIC BLOOD PRESSURE: 68 MMHG | HEART RATE: 76 BPM | BODY MASS INDEX: 25.9 KG/M2 | OXYGEN SATURATION: 97 % | WEIGHT: 185 LBS | SYSTOLIC BLOOD PRESSURE: 118 MMHG | HEIGHT: 71 IN | RESPIRATION RATE: 12 BRPM

## 2023-03-26 DIAGNOSIS — R51.9 NONINTRACTABLE HEADACHE, UNSPECIFIED CHRONICITY PATTERN, UNSPECIFIED HEADACHE TYPE: Primary | ICD-10-CM

## 2023-03-26 PROCEDURE — 2580000003 HC RX 258: Performed by: EMERGENCY MEDICINE

## 2023-03-26 PROCEDURE — 99284 EMERGENCY DEPT VISIT MOD MDM: CPT

## 2023-03-26 RX ORDER — 0.9 % SODIUM CHLORIDE 0.9 %
1000 INTRAVENOUS SOLUTION INTRAVENOUS ONCE
Status: COMPLETED | OUTPATIENT
Start: 2023-03-26 | End: 2023-03-26

## 2023-03-26 RX ADMIN — SODIUM CHLORIDE 1000 ML: 9 INJECTION, SOLUTION INTRAVENOUS at 12:07

## 2023-03-26 NOTE — ED NOTES
Radiology Procedure Waiver   Name: Amarilis Bailey  : 1970  MRN: [id number]    Date:  3/26/23    Time: 12:28 PM EDT    Benefits of immediately proceeding with Radiology exam(s) without pre-testing outweigh the risks or are not indicated as specified below and therefore the following is/are being waived:    [] Pregnancy test   [] Patients LMP on-time and regular.   [] Patient had Tubal Ligation or has other Contraception Device. [] Patient  is Menopausal or Premenarcheal.    [] Patient had Full or Partial Hysterectomy. [] Protocol for Iodine allergy    [] MRI Questionnaire     [x] BUN/Creatinine   [x] Patient age w/no hx of renal dysfunction. [] Patient on Dialysis. [] Recent Normal Labs.   Electronically signed by Rosa Kent MD on 3/26/23 at 12:28 PM EDT               Adria Rodriguez MD  23 [phone]

## 2023-03-26 NOTE — ED NOTES
Pt eloped before being taken to CT. IV found in trash can at bedside. Dr. Juan Chou Notified.       Nohemi Diaz RN  03/26/23 1028

## 2023-03-26 NOTE — ED PROVIDER NOTES
based on the characteristics he described with his onset. He was just admitted to the hospital so I do not feel he needs laboratory testing as it was normal recently. CAT scans were ordered, he was slated to have a CAT scan and then he eloped from the emergency department. He was unable to be located and was unable to be contacted. Problems Addressed:  Nonintractable headache, unspecified chronicity pattern, unspecified headache type: undiagnosed new problem with uncertain prognosis    Amount and/or Complexity of Data Reviewed  External Data Reviewed: notes. Radiology: ordered. Risk  Prescription drug management. CONSULTS:   None         PROCEDURES   Unless otherwise noted below, none       CRITICAL CARE TIME (.cct)            I am the Primary Clinician of Record. FINAL IMPRESSION      1. Nonintractable headache, unspecified chronicity pattern, unspecified headache type          DISPOSITION/PLAN     DISPOSITION Eloped - Left Before Treatment Complete 03/26/2023 01:32:43 PM      PATIENT REFERRED TO:  No follow-up provider specified.     DISCHARGE MEDICATIONS:  Discharge Medication List as of 3/26/2023  1:51 PM          DISCONTINUED MEDICATIONS:  Discharge Medication List as of 3/26/2023  1:51 PM                 (Please note that portions of this note were completed with a voice recognition program.  Efforts were made to edit the dictations but occasionally words are mis-transcribed.)    Eric Arce MD (electronically signed)         Stanford Horan MD  03/26/23 [id number]

## 2023-03-27 ENCOUNTER — TELEPHONE (OUTPATIENT)
Dept: PRIMARY CARE CLINIC | Age: 53
End: 2023-03-27

## 2023-03-27 NOTE — TELEPHONE ENCOUNTER
I called parent's home to schedule post hospital fu and mother states he is at [facility] rehab at this time.

## 2023-11-09 NOTE — PROGRESS NOTES
Pt has been sleeping on and off for past few hours after Geodon given.  No Ativan given at this time, will continue to reassess based on CIWA scale and pt behavior Ndc# For Kenalog Only: 7260-4865-13 Ndc# For Kenalog Only: 5737-0550-19

## 2024-01-29 NOTE — CARE COORDINATION
Discharge order noted. Per Laney from [facility], patient now not interested in residential inpatient rehab services. I met with patient in room to discuss transition of care. I offered [facility] for addiction services and he declined. He said that he will be following up with Sally Catalan and Miya. I voiced my concerns of him going home alone and he said that he may be able to stay with his parents for a while. He said that someone from [facility] will be transporting him home today, he has the number and will call for the ride.    Fermin Kumar RN CM  [phone] .

## 2024-11-06 ENCOUNTER — OFFICE VISIT (OUTPATIENT)
Dept: PRIMARY CARE | Facility: CLINIC | Age: 54
End: 2024-11-06
Payer: MEDICAID

## 2024-11-06 ENCOUNTER — APPOINTMENT (OUTPATIENT)
Dept: PRIMARY CARE | Facility: CLINIC | Age: 54
End: 2024-11-06

## 2024-11-06 VITALS
SYSTOLIC BLOOD PRESSURE: 132 MMHG | DIASTOLIC BLOOD PRESSURE: 84 MMHG | HEIGHT: 71 IN | WEIGHT: 193 LBS | BODY MASS INDEX: 27.02 KG/M2 | HEART RATE: 99 BPM | OXYGEN SATURATION: 99 %

## 2024-11-06 DIAGNOSIS — F11.91 OPIOID USE DISORDER IN REMISSION: ICD-10-CM

## 2024-11-06 DIAGNOSIS — Z13.1 SCREENING FOR DIABETES MELLITUS: ICD-10-CM

## 2024-11-06 DIAGNOSIS — Z13.220 SCREENING FOR LIPID DISORDERS: ICD-10-CM

## 2024-11-06 DIAGNOSIS — F41.8 DEPRESSION WITH ANXIETY: ICD-10-CM

## 2024-11-06 DIAGNOSIS — B18.2 CHRONIC HEPATITIS C WITHOUT HEPATIC COMA (MULTI): ICD-10-CM

## 2024-11-06 DIAGNOSIS — Z76.89 ENCOUNTER TO ESTABLISH CARE: Primary | ICD-10-CM

## 2024-11-06 DIAGNOSIS — R35.0 URINARY FREQUENCY: ICD-10-CM

## 2024-11-06 DIAGNOSIS — Z72.0 TOBACCO USE: ICD-10-CM

## 2024-11-06 DIAGNOSIS — Z11.4 SCREENING FOR HIV (HUMAN IMMUNODEFICIENCY VIRUS): ICD-10-CM

## 2024-11-06 DIAGNOSIS — R39.12 WEAK URINARY STREAM: ICD-10-CM

## 2024-11-06 LAB
APPEARANCE UR: CLEAR
BILIRUB UR STRIP.AUTO-MCNC: NEGATIVE MG/DL
COLOR UR: YELLOW
GLUCOSE UR STRIP.AUTO-MCNC: NORMAL MG/DL
KETONES UR STRIP.AUTO-MCNC: NEGATIVE MG/DL
LEUKOCYTE ESTERASE UR QL STRIP.AUTO: NEGATIVE
NITRITE UR QL STRIP.AUTO: NEGATIVE
PH UR STRIP.AUTO: 5 [PH]
PROT UR STRIP.AUTO-MCNC: NEGATIVE MG/DL
RBC # UR STRIP.AUTO: NEGATIVE /UL
SP GR UR STRIP.AUTO: 1.02
UROBILINOGEN UR STRIP.AUTO-MCNC: NORMAL MG/DL

## 2024-11-06 PROCEDURE — 90656 IIV3 VACC NO PRSV 0.5 ML IM: CPT

## 2024-11-06 PROCEDURE — 99214 OFFICE O/P EST MOD 30 MIN: CPT

## 2024-11-06 PROCEDURE — 3008F BODY MASS INDEX DOCD: CPT

## 2024-11-06 PROCEDURE — 81003 URINALYSIS AUTO W/O SCOPE: CPT

## 2024-11-06 PROCEDURE — 99204 OFFICE O/P NEW MOD 45 MIN: CPT

## 2024-11-06 RX ORDER — GABAPENTIN 300 MG/1
300 CAPSULE ORAL 3 TIMES DAILY
COMMUNITY

## 2024-11-06 RX ORDER — BUPROPION HYDROCHLORIDE 300 MG/1
300 TABLET ORAL DAILY
COMMUNITY

## 2024-11-06 ASSESSMENT — ENCOUNTER SYMPTOMS
SHORTNESS OF BREATH: 0
DYSURIA: 0
WHEEZING: 0
FREQUENCY: 1
ABDOMINAL PAIN: 0
HEMATURIA: 0
FEVER: 0
CHILLS: 0
COUGH: 0

## 2024-11-06 ASSESSMENT — PATIENT HEALTH QUESTIONNAIRE - PHQ9
2. FEELING DOWN, DEPRESSED OR HOPELESS: SEVERAL DAYS
1. LITTLE INTEREST OR PLEASURE IN DOING THINGS: SEVERAL DAYS
SUM OF ALL RESPONSES TO PHQ9 QUESTIONS 1 AND 2: 2
10. IF YOU CHECKED OFF ANY PROBLEMS, HOW DIFFICULT HAVE THESE PROBLEMS MADE IT FOR YOU TO DO YOUR WORK, TAKE CARE OF THINGS AT HOME, OR GET ALONG WITH OTHER PEOPLE: NOT DIFFICULT AT ALL

## 2024-11-06 ASSESSMENT — PAIN SCALES - GENERAL: PAINLEVEL_OUTOF10: 0-NO PAIN

## 2024-11-06 NOTE — PROGRESS NOTES
"Subjective   Patient ID: Eldon Bravo is a 54 y.o. male who presents for Establish Care (Patient has HEP-C would like to discuss medications/dd/Patient would also like to discuss PSA testing, urine frequency/dd).    Hx substance use disorder (one year remission, on methadone), depression/anxiety), hepatitis C, tobacco use (down to 5 a day, using nicotine gum)    Other providers: Psychiatrist (rx for Wellbutrin gabapentin), also Methadone clinic, consider comign off soon    Urinary frequency, patient is going about 5 times a night. Has been occurring for about 6 months. Has stopped drinking liquids after 7pm. Has never had PSA checked, adopted so unknown family history. +weak stream, +urgency. Denies dysuria, abdominal pain, hematuria.        Review of Systems   Constitutional:  Negative for chills and fever.   Respiratory:  Negative for cough, shortness of breath and wheezing.    Cardiovascular:  Negative for chest pain.   Gastrointestinal:  Negative for abdominal pain.   Genitourinary:  Positive for frequency and urgency. Negative for dysuria and hematuria.       Objective   /84   Pulse 99   Ht 1.803 m (5' 11\")   Wt 87.5 kg (193 lb)   SpO2 99%   BMI 26.92 kg/m²     Physical Exam  Constitutional:       General: He is not in acute distress.     Appearance: Normal appearance.   Cardiovascular:      Rate and Rhythm: Normal rate and regular rhythm.      Heart sounds: No murmur heard.  Pulmonary:      Effort: Pulmonary effort is normal.      Breath sounds: Normal breath sounds. No wheezing, rhonchi or rales.   Skin:     General: Skin is warm and dry.      Findings: No rash.   Neurological:      Mental Status: He is alert.   Psychiatric:         Mood and Affect: Mood and affect normal.         Assessment/Plan   Diagnoses and all orders for this visit:  Encounter to establish care  Chronic hepatitis C without hepatic coma (Multi)  -     Hepatitis C Antibody; Future  -     CBC and Auto Differential; Future  -    "  Referral to Gastroenterology; Future  Screening for lipid disorders  -     Lipid panel; Future  Screening for diabetes mellitus  -     Comprehensive metabolic panel; Future  Urinary frequency  -     PSA; Future  -     Urinalysis with Reflex Culture and Microscopic  Weak urinary stream  -     PSA; Future  Screening for HIV (human immunodeficiency virus)  -     HIV 1/2 Antigen/Antibody Screen with Reflex to Confirmation; Future  Opioid use disorder in remission  Depression with anxiety  Tobacco use  Other orders  -     Flu vaccine, trivalent, preservative free, age 6 months and greater (Fluarix/Fluzone/Flulaval)

## 2024-11-07 ENCOUNTER — TELEPHONE (OUTPATIENT)
Dept: PRIMARY CARE | Facility: CLINIC | Age: 54
End: 2024-11-07
Payer: MEDICAID

## 2024-11-07 LAB — HOLD SPECIMEN: NORMAL

## 2024-11-11 ENCOUNTER — LAB (OUTPATIENT)
Dept: LAB | Facility: LAB | Age: 54
End: 2024-11-11
Payer: MEDICAID

## 2024-11-11 DIAGNOSIS — R35.0 URINARY FREQUENCY: ICD-10-CM

## 2024-11-11 DIAGNOSIS — B18.2 CHRONIC HEPATITIS C WITHOUT HEPATIC COMA (MULTI): ICD-10-CM

## 2024-11-11 DIAGNOSIS — Z13.220 SCREENING FOR LIPID DISORDERS: ICD-10-CM

## 2024-11-11 DIAGNOSIS — Z13.1 SCREENING FOR DIABETES MELLITUS: ICD-10-CM

## 2024-11-11 DIAGNOSIS — Z11.4 SCREENING FOR HIV (HUMAN IMMUNODEFICIENCY VIRUS): ICD-10-CM

## 2024-11-11 DIAGNOSIS — R39.12 WEAK URINARY STREAM: ICD-10-CM

## 2024-11-11 LAB
BASOPHILS # BLD AUTO: 0.04 X10*3/UL (ref 0–0.1)
BASOPHILS NFR BLD AUTO: 0.8 %
EOSINOPHIL # BLD AUTO: 0.08 X10*3/UL (ref 0–0.7)
EOSINOPHIL NFR BLD AUTO: 1.6 %
ERYTHROCYTE [DISTWIDTH] IN BLOOD BY AUTOMATED COUNT: 13.7 % (ref 11.5–14.5)
HCT VFR BLD AUTO: 44.1 % (ref 41–52)
HGB BLD-MCNC: 14.8 G/DL (ref 13.5–17.5)
IMM GRANULOCYTES # BLD AUTO: 0.03 X10*3/UL (ref 0–0.7)
IMM GRANULOCYTES NFR BLD AUTO: 0.6 % (ref 0–0.9)
LYMPHOCYTES # BLD AUTO: 1.46 X10*3/UL (ref 1.2–4.8)
LYMPHOCYTES NFR BLD AUTO: 29.4 %
MCH RBC QN AUTO: 30.8 PG (ref 26–34)
MCHC RBC AUTO-ENTMCNC: 33.6 G/DL (ref 32–36)
MCV RBC AUTO: 92 FL (ref 80–100)
MONOCYTES # BLD AUTO: 0.52 X10*3/UL (ref 0.1–1)
MONOCYTES NFR BLD AUTO: 10.5 %
NEUTROPHILS # BLD AUTO: 2.83 X10*3/UL (ref 1.2–7.7)
NEUTROPHILS NFR BLD AUTO: 57.1 %
NRBC BLD-RTO: 0 /100 WBCS (ref 0–0)
PLATELET # BLD AUTO: 141 X10*3/UL (ref 150–450)
RBC # BLD AUTO: 4.81 X10*6/UL (ref 4.5–5.9)
WBC # BLD AUTO: 5 X10*3/UL (ref 4.4–11.3)

## 2024-11-11 PROCEDURE — 85025 COMPLETE CBC W/AUTO DIFF WBC: CPT

## 2024-11-11 PROCEDURE — 84153 ASSAY OF PSA TOTAL: CPT

## 2024-11-11 PROCEDURE — 87522 HEPATITIS C REVRS TRNSCRPJ: CPT

## 2024-11-11 PROCEDURE — 36415 COLL VENOUS BLD VENIPUNCTURE: CPT

## 2024-11-11 PROCEDURE — 80053 COMPREHEN METABOLIC PANEL: CPT

## 2024-11-11 PROCEDURE — 86803 HEPATITIS C AB TEST: CPT

## 2024-11-11 PROCEDURE — 80061 LIPID PANEL: CPT

## 2024-11-11 PROCEDURE — 87389 HIV-1 AG W/HIV-1&-2 AB AG IA: CPT

## 2024-11-12 ENCOUNTER — TELEPHONE (OUTPATIENT)
Dept: PRIMARY CARE | Facility: CLINIC | Age: 54
End: 2024-11-12
Payer: MEDICAID

## 2024-11-12 LAB
ALBUMIN SERPL BCP-MCNC: 4 G/DL (ref 3.4–5)
ALP SERPL-CCNC: 44 U/L (ref 33–120)
ALT SERPL W P-5'-P-CCNC: 86 U/L (ref 10–52)
ANION GAP SERPL CALC-SCNC: 11 MMOL/L (ref 10–20)
AST SERPL W P-5'-P-CCNC: 54 U/L (ref 9–39)
BILIRUB SERPL-MCNC: 0.8 MG/DL (ref 0–1.2)
BUN SERPL-MCNC: 11 MG/DL (ref 6–23)
CALCIUM SERPL-MCNC: 9.1 MG/DL (ref 8.6–10.6)
CHLORIDE SERPL-SCNC: 102 MMOL/L (ref 98–107)
CHOLEST SERPL-MCNC: 83 MG/DL (ref 0–199)
CHOLESTEROL/HDL RATIO: 2.1
CO2 SERPL-SCNC: 32 MMOL/L (ref 21–32)
CREAT SERPL-MCNC: 0.92 MG/DL (ref 0.5–1.3)
EGFRCR SERPLBLD CKD-EPI 2021: >90 ML/MIN/1.73M*2
GLUCOSE SERPL-MCNC: 129 MG/DL (ref 74–99)
HCV AB SER QL: REACTIVE
HCV RNA SERPL NAA+PROBE-ACNC: ABNORMAL IU/ML
HCV RNA SERPL NAA+PROBE-ACNC: DETECTED K[IU]/ML
HCV RNA SERPL NAA+PROBE-LOG IU: 7.17 LOG IU/ML
HDLC SERPL-MCNC: 39.4 MG/DL
HIV 1+2 AB+HIV1 P24 AG SERPL QL IA: NONREACTIVE
LDLC SERPL CALC-MCNC: 35 MG/DL
NON HDL CHOLESTEROL: 44 MG/DL (ref 0–149)
POTASSIUM SERPL-SCNC: 4.4 MMOL/L (ref 3.5–5.3)
PROT SERPL-MCNC: 6.6 G/DL (ref 6.4–8.2)
PSA SERPL-MCNC: 0.25 NG/ML
SODIUM SERPL-SCNC: 141 MMOL/L (ref 136–145)
TRIGL SERPL-MCNC: 43 MG/DL (ref 0–149)
VLDL: 9 MG/DL (ref 0–40)

## 2024-11-12 NOTE — LETTER
November 15, 2024     Eldon Bravo  [address] [facility] [address]      Dear Mr. Bravo:    This letter informing you of your lab results since we were unable to reach you by phone.        Labs were normal except for the known positive hepatitis C, with associated slightly elevated liver enzymes and low platelet count which goes with liver disease. Important you follow up with liver specialist, you have an appointment in February and its okay to wait that long, none of your labs are critical.      You can schedule a CPE with me in after 2/26/24 so we can discuss your appointment with the specialist         Sincerely,        Marisol Gee PA-C

## 2024-11-12 NOTE — TELEPHONE ENCOUNTER
Labs were normal except for the known positive hepatitis C, with associated slightly elevated liver enzymes and low platelet count which goes with liver disease. Important he follows up with liver specialist, I see he has an appointment in Feb, is okay to wait that long, none of his labs are critical.     He can schedule a CPE with me in after 2/26/24 so we can discuss his appointment with the specialist.

## 2025-02-04 NOTE — PROGRESS NOTES
Call placed to Dr López Spotted regarding patient's continued restlessness despite medication.  Received telephone order to transfer to ICU under Dr Terrell Valdez for high CIWA scores     Francisco J Clifford RN  02/25/23  3:52 PM Quality 130: Documentation Of Current Medications In The Medical Record: Current Medications Documented Quality 47: Advance Care Plan: Advance Care Planning discussed and documented in the medical record; patient did not wish or was not able to name a surrogate decision maker or provide an advance care plan. Quality 226: Preventive Care And Screening: Tobacco Use: Screening And Cessation Intervention: Patient screened for tobacco use and is an ex/non-smoker Detail Level: Detailed